# Patient Record
Sex: FEMALE
[De-identification: names, ages, dates, MRNs, and addresses within clinical notes are randomized per-mention and may not be internally consistent; named-entity substitution may affect disease eponyms.]

---

## 2024-07-31 ENCOUNTER — APPOINTMENT (OUTPATIENT)
Dept: GERIATRICS | Facility: CLINIC | Age: 79
End: 2024-07-31
Payer: SELF-PAY

## 2024-07-31 VITALS
OXYGEN SATURATION: 98 % | SYSTOLIC BLOOD PRESSURE: 124 MMHG | HEART RATE: 72 BPM | RESPIRATION RATE: 14 BRPM | DIASTOLIC BLOOD PRESSURE: 78 MMHG

## 2024-07-31 DIAGNOSIS — F32.A ANXIETY DISORDER, UNSPECIFIED: ICD-10-CM

## 2024-07-31 DIAGNOSIS — E10.9 TYPE 1 DIABETES MELLITUS W/OUT COMPLICATIONS: ICD-10-CM

## 2024-07-31 DIAGNOSIS — F41.9 ANXIETY DISORDER, UNSPECIFIED: ICD-10-CM

## 2024-07-31 DIAGNOSIS — K86.89 OTHER SPECIFIED DISEASES OF PANCREAS: ICD-10-CM

## 2024-07-31 DIAGNOSIS — Z71.89 OTHER SPECIFIED COUNSELING: ICD-10-CM

## 2024-07-31 DIAGNOSIS — I10 ESSENTIAL (PRIMARY) HYPERTENSION: ICD-10-CM

## 2024-07-31 DIAGNOSIS — K59.01 SLOW TRANSIT CONSTIPATION: ICD-10-CM

## 2024-07-31 DIAGNOSIS — S39.92XA UNSPECIFIED INJURY OF LOWER BACK, INITIAL ENCOUNTER: ICD-10-CM

## 2024-07-31 DIAGNOSIS — R26.9 UNSPECIFIED ABNORMALITIES OF GAIT AND MOBILITY: ICD-10-CM

## 2024-07-31 DIAGNOSIS — F03.90 UNSPECIFIED DEMENTIA W/OUT BEHAVIORAL DISTURBANCE: ICD-10-CM

## 2024-07-31 DIAGNOSIS — L89.92 PRESSURE ULCER OF UNSPECIFIED SITE, STAGE 2: ICD-10-CM

## 2024-07-31 DIAGNOSIS — R10.9 UNSPECIFIED ABDOMINAL PAIN: ICD-10-CM

## 2024-07-31 PROBLEM — Z00.00 ENCOUNTER FOR PREVENTIVE HEALTH EXAMINATION: Status: ACTIVE | Noted: 2024-07-31

## 2024-07-31 PROCEDURE — 99344 HOME/RES VST NEW MOD MDM 60: CPT

## 2024-07-31 RX ORDER — LOSARTAN POTASSIUM 25 MG/1
25 TABLET, FILM COATED ORAL
Qty: 90 | Refills: 2 | Status: ACTIVE | COMMUNITY
Start: 2024-07-31

## 2024-07-31 RX ORDER — DOCUSATE SODIUM 100 MG/1
100 CAPSULE, LIQUID FILLED ORAL 3 TIMES DAILY
Qty: 90 | Refills: 1 | Status: ACTIVE | COMMUNITY
Start: 2024-07-31 | End: 1900-01-01

## 2024-07-31 RX ORDER — INSULIN LISPRO-AABC 100 [IU]/ML
100 INJECTION, SOLUTION SUBCUTANEOUS
Qty: 1 | Refills: 3 | Status: ACTIVE | COMMUNITY
Start: 2024-07-31 | End: 1900-01-01

## 2024-07-31 RX ORDER — ESCITALOPRAM OXALATE 5 MG/1
5 TABLET ORAL DAILY
Qty: 90 | Refills: 0 | Status: ACTIVE | COMMUNITY
Start: 2024-07-31 | End: 1900-01-01

## 2024-07-31 RX ORDER — AMLODIPINE BESYLATE 2.5 MG/1
2.5 TABLET ORAL
Qty: 90 | Refills: 1 | Status: ACTIVE | COMMUNITY
Start: 2024-07-31 | End: 1900-01-01

## 2024-07-31 RX ORDER — RIVASTIGMINE 4.6 MG/24H
4.6 PATCH, EXTENDED RELEASE TRANSDERMAL DAILY
Qty: 90 | Refills: 0 | Status: ACTIVE | COMMUNITY
Start: 2024-07-31 | End: 1900-01-01

## 2024-08-01 ENCOUNTER — EMERGENCY (EMERGENCY)
Facility: HOSPITAL | Age: 79
LOS: 1 days | Discharge: HOME | End: 2024-08-01
Attending: EMERGENCY MEDICINE | Admitting: EMERGENCY MEDICINE
Payer: MEDICARE

## 2024-08-01 VITALS
OXYGEN SATURATION: 99 % | SYSTOLIC BLOOD PRESSURE: 135 MMHG | TEMPERATURE: 99 F | RESPIRATION RATE: 18 BRPM | DIASTOLIC BLOOD PRESSURE: 71 MMHG | HEART RATE: 70 BPM

## 2024-08-01 VITALS
TEMPERATURE: 98 F | WEIGHT: 115.96 LBS | HEART RATE: 72 BPM | HEIGHT: 63 IN | DIASTOLIC BLOOD PRESSURE: 73 MMHG | RESPIRATION RATE: 16 BRPM | OXYGEN SATURATION: 97 % | SYSTOLIC BLOOD PRESSURE: 148 MMHG

## 2024-08-01 DIAGNOSIS — I10 ESSENTIAL (PRIMARY) HYPERTENSION: ICD-10-CM
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
## 2024-08-01 DIAGNOSIS — S01.01XA LACERATION WITHOUT FOREIGN BODY OF SCALP, INITIAL ENCOUNTER: ICD-10-CM

## 2024-08-01 DIAGNOSIS — Z23 ENCOUNTER FOR IMMUNIZATION: ICD-10-CM

## 2024-08-01 DIAGNOSIS — S09.90XA UNSPECIFIED INJURY OF HEAD, INITIAL ENCOUNTER: ICD-10-CM
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
## 2024-08-01 DIAGNOSIS — E11.9 TYPE 2 DIABETES MELLITUS WITHOUT COMPLICATIONS: ICD-10-CM

## 2024-08-01 PROBLEM — E10.9 T1DM (TYPE 1 DIABETES MELLITUS): Status: ACTIVE | Noted: 2024-07-31

## 2024-08-01 PROBLEM — K86.89 PANCREATIC MASS: Status: ACTIVE | Noted: 2024-08-01

## 2024-08-01 PROBLEM — F41.9 ANXIETY AND DEPRESSION: Status: ACTIVE | Noted: 2024-07-31

## 2024-08-01 PROBLEM — S39.92XA BACK INJURY: Status: ACTIVE | Noted: 2024-08-01

## 2024-08-01 PROBLEM — K59.01 SLOW TRANSIT CONSTIPATION: Status: ACTIVE | Noted: 2024-07-31

## 2024-08-01 PROBLEM — R10.9 STOMACH CRAMPS: Status: ACTIVE | Noted: 2024-07-31

## 2024-08-01 PROBLEM — Z71.89 ACP (ADVANCE CARE PLANNING): Status: ACTIVE | Noted: 2024-08-01

## 2024-08-01 PROBLEM — L89.92 PRESSURE INJURY, STAGE 2: Status: ACTIVE | Noted: 2024-08-01

## 2024-08-01 PROBLEM — F03.90 DEMENTIA: Status: ACTIVE | Noted: 2024-07-31

## 2024-08-01 PROBLEM — R26.9 ABNORMAL GAIT: Status: ACTIVE | Noted: 2024-08-01

## 2024-08-01 PROCEDURE — 72125 CT NECK SPINE W/O DYE: CPT | Mod: MC

## 2024-08-01 PROCEDURE — 70450 CT HEAD/BRAIN W/O DYE: CPT | Mod: 26,MC

## 2024-08-01 PROCEDURE — 72125 CT NECK SPINE W/O DYE: CPT | Mod: 26,MC

## 2024-08-01 PROCEDURE — 12001 RPR S/N/AX/GEN/TRNK 2.5CM/<: CPT

## 2024-08-01 PROCEDURE — 72170 X-RAY EXAM OF PELVIS: CPT

## 2024-08-01 PROCEDURE — 90715 TDAP VACCINE 7 YRS/> IM: CPT

## 2024-08-01 PROCEDURE — 99284 EMERGENCY DEPT VISIT MOD MDM: CPT | Mod: 25

## 2024-08-01 PROCEDURE — 71045 X-RAY EXAM CHEST 1 VIEW: CPT | Mod: 26

## 2024-08-01 PROCEDURE — 90471 IMMUNIZATION ADMIN: CPT

## 2024-08-01 PROCEDURE — 71045 X-RAY EXAM CHEST 1 VIEW: CPT

## 2024-08-01 PROCEDURE — 72170 X-RAY EXAM OF PELVIS: CPT | Mod: 26

## 2024-08-01 PROCEDURE — 70450 CT HEAD/BRAIN W/O DYE: CPT | Mod: MC

## 2024-08-01 PROCEDURE — 99285 EMERGENCY DEPT VISIT HI MDM: CPT | Mod: 25

## 2024-08-02 NOTE — REVIEW OF SYSTEMS
[Confused] : confusion [As Noted in HPI] : as noted in HPI [Negative] : Heme/Lymph [Feeling Poorly] : not feeling poorly [Feeling Tired] : not feeling tired [Discharge From Eyes] : no purulent discharge from the eyes [Dry Eyes] : no dryness of the eyes [Nosebleeds] : no nosebleeds [Nasal Discharge] : no nasal discharge [Chest Pain] : no chest pain [Palpitations] : no palpitations [Shortness Of Breath] : no shortness of breath [Wheezing] : no wheezing [Cough] : no cough [SOB on Exertion] : no shortness of breath during exertion [Abdominal Pain] : no abdominal pain [Vomiting] : no vomiting [Constipation] : no constipation [Diarrhea] : no diarrhea [Dysuria] : no dysuria [Limb Pain] : no limb pain [Skin Wound] : no skin wound [Dizziness] : no dizziness [Anxiety] : no anxiety [Depression] : no depression

## 2024-08-02 NOTE — ADDENDUM
[FreeTextEntry1] : Attempted to speak with patient's HCP to get medical history. Only information that was obtained was that patient was living in [place] in an [facility] prior to visiting [place] for a weekend. On that visit, she fell, ended up at [facility] and was found to have an L1-L2 compression fracture. She then went to an [facility] and was discharged to [place].   Soon after, HCP, Minor, became incredibly agitated yelling about the various things she is upset with at [facility]. Despite my attempts to address her frustrations, she continued to yell, would not answer pertinent medical questions about the patient and stated several demeaning things about NPs.   This has been escalated to appropriate management.  Franny Preciado, MARIE-BC

## 2024-08-02 NOTE — HISTORY OF PRESENT ILLNESS
[With Patient/Caregiver] : , with patient/caregiver [Reviewed no changes] : Reviewed, no changes [Any fall with injury in past year] : Patient reported fall with injury in the past year [Full assistance needed] : Assistance needed managing medications [] : Assistance needed managing finances. [NO] : No [0] : 2) Feeling down, depressed, or hopeless: Not at all (0) [PHQ-2 Negative - No further assessment needed] : PHQ-2 Negative - No further assessment needed [FreeTextEntry1] : CHAGO ROSAS is a 80 yo woman with PMH of dementia, anxiety and depression, T1DM, HTN and ?pancreatic mass who presents for initial evaluation at [place] [address] [facility]. Patient is accompanied by [facility] staff for visit. History obtained largely from [address] and [facility] RNs.  Patient moved to [facility] on 7/31/24.   Patient is a poor historian. Called HCP (cousin, Minor) but unable to speak today. Plan to further discuss on Friday. Prior to coming to [facility], patient had a fall (unclear when). Injuries are unclear however states "I hurt my back" and she currently has a back brace on. States she feels well and that she does not have any acute medical concerns.   Specialists: Dr. Courtney Sewell -neuro   Dementia:  -unclear when this was diagnosed or if patient follows with neuro -not on meds for dementia -no documented behavioral disturbance  Anxiety/depression: -on lexapro 5mg qd  T1DM: -on Lyumjev insulin prior to meals (sliding scale) -on tresiba 18 units qhs - was receiving this in the morning at [facility]  -just got here today so no blood sugar log for review  HTN: -on amlodipine 2.5 mg and losartan 25mg qd  ?pancreatic mass: -this history is unclear- need collateral history from HCP  -had a Whiple procedure and   ACP: HCP: Minor Wayne ([phone])/ alternate HCP: James Wayne ([phone]) -no MOLST in place   Immunizations: need records COVID-19: Flu: Shingles: Pna: Tdap:   [FreeTextEntry7] : need records [Driving Concerns] : not driving or driving without noted concerns [de-identified] : 0 [de-identified] : rollator  [[id number]] : 0 [AdvancecareDate] : 07/24 [FreeTextEntry4] : HCP: Minor Wayne ([phone])/ alternate HCP: James Wayne ([phone]) no MOLST in place

## 2024-08-02 NOTE — PHYSICAL EXAM
[Alert] : alert [No Acute Distress] : in no acute distress [Sclera] : the sclera and conjunctiva were normal [EOMI] : extraocular movements were intact [Normal Oral Mucosa] : normal oral mucosa [Normal Outer Ear/Nose] : the ears and nose were normal in appearance [Normal Appearance] : the appearance of the neck was normal [Supple] : the neck was supple [No Respiratory Distress] : no respiratory distress [No Acc Muscle Use] : no accessory muscle use [Respiration, Rhythm And Depth] : normal respiratory rhythm and effort [Auscultation Breath Sounds / Voice Sounds] : lungs were clear to auscultation bilaterally [Normal S1, S2] : normal S1 and S2 [Heart Rate And Rhythm] : heart rate was normal and rhythm regular [Edema] : edema was not present [Bowel Sounds] : normal bowel sounds [Abdomen Tenderness] : non-tender [Abdomen Soft] : soft [Cervical Lymph Nodes Enlarged Posterior Bilaterally] : posterior cervical [Cervical Lymph Nodes Enlarged Anterior Bilaterally] : anterior cervical, supraclavicular [No Spinal Tenderness] : no spinal tenderness [No Focal Deficits] : no focal deficits [Normal Affect] : the affect was normal [Normal Mood] : the mood was normal [Oriented to Person] : oriented to person [Normal Gait] : abnormal gait [Normal Insight/Judgment] : insight and judgment were not intact [Oriented to Place] : disoriented to place [Oriented to Time] : disoriented to time [de-identified] : stage 2 sacral pressure injury

## 2024-08-02 NOTE — HISTORY OF PRESENT ILLNESS
[With Patient/Caregiver] : , with patient/caregiver [Reviewed no changes] : Reviewed, no changes [Any fall with injury in past year] : Patient reported fall with injury in the past year [Full assistance needed] : Assistance needed managing medications [] : Assistance needed managing finances. [NO] : No [0] : 2) Feeling down, depressed, or hopeless: Not at all (0) [PHQ-2 Negative - No further assessment needed] : PHQ-2 Negative - No further assessment needed [FreeTextEntry1] : CHAGO ROSAS is a 78 yo woman with PMH of dementia, anxiety and depression, T1DM, HTN and ?pancreatic mass who presents for initial evaluation at [place] [address] [facility]. Patient is accompanied by [facility] staff for visit. History obtained largely from [address] and [facility] RNs.  Patient moved to [facility] on 7/31/24.   Patient is a poor historian. Called HCP (cousin, Minor) but unable to speak today. Plan to further discuss on Friday. Prior to coming to [facility], patient had a fall (unclear when). Injuries are unclear however states "I hurt my back" and she currently has a back brace on. States she feels well and that she does not have any acute medical concerns.   Specialists: Dr. Courtney Sewell -neuro   Dementia:  -unclear when this was diagnosed or if patient follows with neuro -not on meds for dementia -no documented behavioral disturbance  Anxiety/depression: -on lexapro 5mg qd  T1DM: -on Lyumjev insulin prior to meals (sliding scale) -on tresiba 18 units qhs - was receiving this in the morning at [facility]  -just got here today so no blood sugar log for review  HTN: -on amlodipine 2.5 mg and losartan 25mg qd  ?pancreatic mass: -this history is unclear- need collateral history from HCP  -had a Whiple procedure and   ACP: HCP: Minor Wayne ([phone])/ alternate HCP: James Wayne ([phone]) -no MOLST in place   Immunizations: need records COVID-19: Flu: Shingles: Pna: Tdap:   [FreeTextEntry7] : need records [Driving Concerns] : not driving or driving without noted concerns [de-identified] : 0 [de-identified] : rollator  [[id number]] : 0 [AdvancecareDate] : 07/24 [FreeTextEntry4] : HCP: Minor Wayne ([phone])/ alternate HCP: James Wayne ([phone]) no MOLST in place

## 2024-08-02 NOTE — PHYSICAL EXAM
[Alert] : alert [No Acute Distress] : in no acute distress [Sclera] : the sclera and conjunctiva were normal [EOMI] : extraocular movements were intact [Normal Oral Mucosa] : normal oral mucosa [Normal Outer Ear/Nose] : the ears and nose were normal in appearance [Normal Appearance] : the appearance of the neck was normal [Supple] : the neck was supple [No Respiratory Distress] : no respiratory distress [No Acc Muscle Use] : no accessory muscle use [Respiration, Rhythm And Depth] : normal respiratory rhythm and effort [Auscultation Breath Sounds / Voice Sounds] : lungs were clear to auscultation bilaterally [Normal S1, S2] : normal S1 and S2 [Heart Rate And Rhythm] : heart rate was normal and rhythm regular [Edema] : edema was not present [Bowel Sounds] : normal bowel sounds [Abdomen Tenderness] : non-tender [Abdomen Soft] : soft [Cervical Lymph Nodes Enlarged Posterior Bilaterally] : posterior cervical [Cervical Lymph Nodes Enlarged Anterior Bilaterally] : anterior cervical, supraclavicular [No Spinal Tenderness] : no spinal tenderness [No Focal Deficits] : no focal deficits [Normal Affect] : the affect was normal [Normal Mood] : the mood was normal [Oriented to Person] : oriented to person [Normal Gait] : abnormal gait [Normal Insight/Judgment] : insight and judgment were not intact [Oriented to Place] : disoriented to place [Oriented to Time] : disoriented to time [de-identified] : stage 2 sacral pressure injury

## 2024-08-02 NOTE — ADDENDUM
[FreeTextEntry1] : Attempted to speak with patient's HCP to get medical history. Only information that was obtained was that [place] was living in [place] in an [facility] prior to visiting [place] for a weekend. On that visit, she fell, ended up at [facility] and was found to have an L1-L2 compression fracture. She then went to an [facility] and was discharged to [place].   Soon after, HCP, Minor, became incredibly agitated yelling about the various things she is upset with at [facility]. Despite my attempts to address her frustrations, she continued to yell, would not answer pertinent medical questions about the patient and stated several demeaning things about NPs.   This has been escalated to appropriate management.  Franny Preciado, MARIE-BC

## 2024-08-02 NOTE — PHYSICAL EXAM
[Alert] : alert [No Acute Distress] : in no acute distress [Sclera] : the sclera and conjunctiva were normal [EOMI] : extraocular movements were intact [Normal Oral Mucosa] : normal oral mucosa [Normal Outer Ear/Nose] : the ears and nose were normal in appearance [Normal Appearance] : the appearance of the neck was normal [Supple] : the neck was supple [No Respiratory Distress] : no respiratory distress [No Acc Muscle Use] : no accessory muscle use [Respiration, Rhythm And Depth] : normal respiratory rhythm and effort [Auscultation Breath Sounds / Voice Sounds] : lungs were clear to auscultation bilaterally [Normal S1, S2] : normal S1 and S2 [Heart Rate And Rhythm] : heart rate was normal and rhythm regular [Edema] : edema was not present [Bowel Sounds] : normal bowel sounds [Abdomen Tenderness] : non-tender [Abdomen Soft] : soft [Cervical Lymph Nodes Enlarged Posterior Bilaterally] : posterior cervical [Cervical Lymph Nodes Enlarged Anterior Bilaterally] : anterior cervical, supraclavicular [No Spinal Tenderness] : no spinal tenderness [No Focal Deficits] : no focal deficits [Normal Affect] : the affect was normal [Normal Mood] : the mood was normal [Oriented to Person] : oriented to person [Normal Gait] : abnormal gait [Normal Insight/Judgment] : insight and judgment were not intact [Oriented to Place] : disoriented to place [Oriented to Time] : disoriented to time [de-identified] : stage 2 sacral pressure injury  PCP

## 2024-08-02 NOTE — ASSESSMENT
[FreeTextEntry1] : Plan to speak with HCP on Friday to obtain more detailed history  Follow up in 2 months; earlier sunitha Preciado, LEANDRAP-BC

## 2024-08-02 NOTE — ADDENDUM
[FreeTextEntry1] : Attempted to speak with patient's HCP to get medical history. Only information that was obtained was that [place] was living [place] in an [facility] prior to visiting [place] for a weekend. On that visit, she fell, ended up at [facility] and was found to have an L1-L2 compression fracture. She then went to an [facility] and was discharged to [place].   Soon after, HCP, Minor, became incredibly agitated yelling about the various things she is upset with at [facility]. Despite my attempts to address her frustrations, she continued to yell, would not answer pertinent medical questions about the patient and stated several demeaning things about NPs.   This has been escalated to appropriate management.  Franny Preciado, MARIE-BC

## 2024-08-02 NOTE — HISTORY OF PRESENT ILLNESS
[With Patient/Caregiver] : , with patient/caregiver [Reviewed no changes] : Reviewed, no changes [Any fall with injury in past year] : Patient reported fall with injury in the past year [Full assistance needed] : Assistance needed managing medications [] : Assistance needed managing finances. [NO] : No [0] : 2) Feeling down, depressed, or hopeless: Not at all (0) [PHQ-2 Negative - No further assessment needed] : PHQ-2 Negative - No further assessment needed [FreeTextEntry1] : CHAGO ROSAS is a 78 yo woman with PMH of dementia, anxiety and depression, T1DM, HTN and ?pancreatic mass who presents for initial evaluation at [place] [address] [facility]. Patient is accompanied by [facility] staff for visit. History obtained largely from [facility] and [facility] RNs.  Patient moved to [facility] on 7/31/24.   Patient is a poor historian. Called HCP (cousin, Minor) but unable to speak today. Plan to further discuss on Friday. Prior to coming to [facility], patient had a fall (unclear when). Injuries are unclear however states "I hurt my back" and she currently has a back brace on. States she feels well and that she does not have any acute medical concerns.   Specialists: Dr. Courtney Sewell -neuro   Dementia:  -unclear when this was diagnosed or if patient follows with neuro -not on meds for dementia -no documented behavioral disturbance  Anxiety/depression: -on lexapro 5mg qd  T1DM: -on Lyumjev insulin prior to meals (sliding scale) -on tresiba 18 units qhs - was receiving this in the morning at [facility]  -just got here today so no blood sugar log for review  HTN: -on amlodipine 2.5 mg and losartan 25mg qd  ?pancreatic mass: -this history is unclear- need collateral history from HCP  -had a Whiple procedure and   ACP: HCP: Minor Wayne ([phone])/ alternate HCP: James Wayne ([phone]) -no MOLST in place   Immunizations: need records COVID-19: Flu: Shingles: Pna: Tdap:   [FreeTextEntry7] : need records [Driving Concerns] : not driving or driving without noted concerns [de-identified] : 0 [de-identified] : rollator  [[id number]] : 0 [AdvancecareDate] : 07/24 [FreeTextEntry4] : HCP: Minor Wayne ([phone])/ alternate HCP: James Wayne ([phone]) no MOLST in place

## 2024-08-05 RX ORDER — INSULIN DEGLUDEC INJECTION 100 U/ML
100 INJECTION, SOLUTION SUBCUTANEOUS
Qty: 1 | Refills: 3 | Status: ACTIVE | COMMUNITY
Start: 2024-07-31

## 2024-08-07 ENCOUNTER — LABORATORY RESULT (OUTPATIENT)
Age: 79
End: 2024-08-07

## 2024-08-08 ENCOUNTER — LABORATORY RESULT (OUTPATIENT)
Age: 79
End: 2024-08-08

## 2024-08-09 RX ORDER — DICYCLOMINE HYDROCHLORIDE 20 MG/1
20 TABLET ORAL
Qty: 90 | Refills: 0 | Status: ACTIVE | COMMUNITY
Start: 2024-07-31 | End: 1900-01-01

## 2024-08-14 ENCOUNTER — LABORATORY RESULT (OUTPATIENT)
Age: 79
End: 2024-08-14

## 2024-08-14 ENCOUNTER — NON-APPOINTMENT (OUTPATIENT)
Age: 79
End: 2024-08-14

## 2024-08-14 ENCOUNTER — APPOINTMENT (OUTPATIENT)
Dept: GERIATRICS | Facility: ASSISTED LIVING FACILITY | Age: 79
End: 2024-08-14

## 2024-08-14 VITALS
OXYGEN SATURATION: 97 % | DIASTOLIC BLOOD PRESSURE: 70 MMHG | HEART RATE: 81 BPM | SYSTOLIC BLOOD PRESSURE: 130 MMHG | RESPIRATION RATE: 14 BRPM

## 2024-08-14 DIAGNOSIS — F32.A ANXIETY DISORDER, UNSPECIFIED: ICD-10-CM

## 2024-08-14 DIAGNOSIS — I10 ESSENTIAL (PRIMARY) HYPERTENSION: ICD-10-CM

## 2024-08-14 DIAGNOSIS — F41.9 ANXIETY DISORDER, UNSPECIFIED: ICD-10-CM

## 2024-08-14 DIAGNOSIS — K59.01 SLOW TRANSIT CONSTIPATION: ICD-10-CM

## 2024-08-14 DIAGNOSIS — E55.9 VITAMIN D DEFICIENCY, UNSPECIFIED: ICD-10-CM

## 2024-08-14 DIAGNOSIS — L89.92 PRESSURE ULCER OF UNSPECIFIED SITE, STAGE 2: ICD-10-CM

## 2024-08-14 DIAGNOSIS — F03.90 UNSPECIFIED DEMENTIA W/OUT BEHAVIORAL DISTURBANCE: ICD-10-CM

## 2024-08-14 DIAGNOSIS — K86.89 OTHER SPECIFIED DISEASES OF PANCREAS: ICD-10-CM

## 2024-08-14 DIAGNOSIS — S01.01XS LACERATION W/OUT FOREIGN BODY OF SCALP, SEQUELA: ICD-10-CM

## 2024-08-14 DIAGNOSIS — D64.9 ANEMIA, UNSPECIFIED: ICD-10-CM

## 2024-08-14 PROCEDURE — 99349 HOME/RES VST EST MOD MDM 40: CPT

## 2024-08-14 PROCEDURE — 15854 REMOVAL SUTR&STAPL XREQ ANES: CPT

## 2024-08-14 PROCEDURE — G2212 PROLONG OUTPT/OFFICE VIS: CPT

## 2024-08-14 PROCEDURE — G2211 COMPLEX E/M VISIT ADD ON: CPT | Mod: NC

## 2024-08-14 RX ORDER — MULTIVIT-MIN/IRON/FOLIC ACID/K 18-600-40
50 MCG CAPSULE ORAL
Qty: 90 | Refills: 3 | Status: ACTIVE | COMMUNITY
Start: 2024-08-14 | End: 1900-01-01

## 2024-08-15 ENCOUNTER — LABORATORY RESULT (OUTPATIENT)
Age: 79
End: 2024-08-15

## 2024-08-17 PROBLEM — S01.01XS SCALP LACERATION, SEQUELA: Status: ACTIVE | Noted: 2024-08-17

## 2024-08-17 PROBLEM — E55.9 VITAMIN D DEFICIENCY: Status: ACTIVE | Noted: 2024-08-14

## 2024-08-17 NOTE — ASSESSMENT
[FreeTextEntry1] : - Healed scalp laceration on right posterior scalp - 4 staples removed. - FSG review shows better am FSG since adjutment of tresiba to 12u QHS 2 days ago. She remains w/ sporadic FSG throughout the day that range from 100s-300s w/ good response to ISS that she remains on. She has endo apt on 8/19. She also eats at different times of the day and different food both at [facility] and outside [facility] when attending outings.  - Endo recs greatly appreciated for DM1 mgmt - consider CGM if deemed appropriate by endo.  - Endo recs appreciated for c/w prolia for OP.  - While plan was discussed w/ pt's HCP/cousin (Dr. Minor Wayne) the collateral history provided was very limited and will require further d/w HCP.  - Pt is doing wound care/PT w/ [facility]. - RTC 2-4 weeks or prn.  I spent 100 min coordinating this patient's care.

## 2024-08-17 NOTE — HISTORY OF PRESENT ILLNESS
[FreeTextEntry1] : CHAGO ROSAS is a 80 yo woman with PMH of dementia, OP (on prolia), anxiety and depression, T1DM, HTN and ?pancreatic mass who presents for f/u visit at [place] [address] [facility]. Patient is accompanied by ANNE-MARIE RN.   She had a fall on the first night she arrived to [facility] on 7/31/24 and was sent to ED [facility] where she received 4 staples on scalp for laceration.   Her tresiba was recently adjusted to 12u QHS 2/2 hypoglycemia in the am - since this adjustment her am FSG have improved to 80's. She remains w/ sporadic FSG throughout the day that range from 100s-300s w/ good response to ISS that she remains on. She has endo apt on 8/19. She also eats at different times of the day and different food both at [facility] and outside [facility] when attending outings.   Today pt is otherwise w/o complaints today.   ========================================== History obtained largely from [facility] and [facility] RNs.  Patient moved to [facility] on 7/31/24.   Patient is a poor historian. Called HCP (cousin, Minor) but unable to speak today. Plan to further discuss on Friday. Prior to coming to [facility], patient had a fall (unclear when). Injuries are unclear however states "I hurt my back" and she currently has a back brace on. States she feels well and that she does not have any acute medical concerns.   Specialists: Dr. Courtney Sewell -neuro   Dementia:  -unclear when this was diagnosed or if patient follows with neuro -not on meds for dementia -no documented behavioral disturbance  Anxiety/depression: -on lexapro 5mg qd  T1DM: -on Lyumjev insulin prior to meals (sliding scale) -on tresiba 18 units qhs - was receiving this in the morning at [facility]  -just got here today so no blood sugar log for review  HTN: -on amlodipine 2.5 mg and losartan 25mg qd  ?pancreatic mass: -this history is unclear- need collateral history from HCP  -had a Whiple procedure and   ACP: HCP: Minor Wayne ([phone])/ alternate HCP: James Wayne ([phone]) -no MOLST in place   Immunizations: need records COVID-19: Flu: Shingles: Pna: Tdap:   [FreeTextEntry7] : need records [Any fall with injury in past year] : Patient reported fall with injury in the past year [Full assistance needed] : Assistance needed managing medications [] : Assistance needed managing finances. [Driving Concerns] : not driving or driving without noted concerns [de-identified] : 0 [de-identified] : rollator  [NO] : No [0] : 2) Feeling down, depressed, or hopeless: Not at all (0) [PHQ-2 Negative - No further assessment needed] : PHQ-2 Negative - No further assessment needed [QGC1Tnnth] : 0 [With Patient/Caregiver] : , with patient/caregiver [Reviewed no changes] : Reviewed, no changes [AdvancecareDate] : 07/24 [FreeTextEntry4] : HCP: Minor Wayne ([phone])/ alternate HCP: James Wayne ([phone]) no MOLST in place

## 2024-08-17 NOTE — PHYSICAL EXAM
[Alert] : alert [Sclera] : the sclera and conjunctiva were normal [EOMI] : extraocular movements were intact [Normal Oral Mucosa] : normal oral mucosa [Normal Outer Ear/Nose] : the ears and nose were normal in appearance [Normal Appearance] : the appearance of the neck was normal [Supple] : the neck was supple [No Respiratory Distress] : no respiratory distress [No Acc Muscle Use] : no accessory muscle use [Respiration, Rhythm And Depth] : normal respiratory rhythm and effort [Normal S1, S2] : normal S1 and S2 [Auscultation Breath Sounds / Voice Sounds] : lungs were clear to auscultation bilaterally [Heart Rate And Rhythm] : heart rate was normal and rhythm regular [Edema] : edema was not present [Bowel Sounds] : normal bowel sounds [Abdomen Tenderness] : non-tender [Abdomen Soft] : soft [No Spinal Tenderness] : no spinal tenderness [Normal Gait] : abnormal gait [No Focal Deficits] : no focal deficits [Normal Affect] : the affect was normal [Normal Insight/Judgment] : insight and judgment were not intact [Normal Mood] : the mood was normal [Oriented to Person] : oriented to person [Oriented to Place] : disoriented to place [Oriented to Time] : disoriented to time [de-identified] : Healed scalp laceration on right posterior scalp - 4 staples removed.

## 2024-08-17 NOTE — REVIEW OF SYSTEMS
[Feeling Poorly] : not feeling poorly [Feeling Tired] : not feeling tired [Eye Pain] : no eye pain [Discharge From Eyes] : no purulent discharge from the eyes [Dry Eyes] : no dryness of the eyes [Nosebleeds] : no nosebleeds [Nasal Discharge] : no nasal discharge [Heart Rate Is Fast] : the heart rate was not fast [Chest Pain] : no chest pain [Palpitations] : no palpitations [Shortness Of Breath] : no shortness of breath [Wheezing] : no wheezing [Cough] : no cough [SOB on Exertion] : no shortness of breath during exertion [Abdominal Pain] : no abdominal pain [Vomiting] : no vomiting [Constipation] : no constipation [Diarrhea] : no diarrhea [Dysuria] : no dysuria [Limb Pain] : no limb pain [Skin Wound] : skin wound [Dizziness] : no dizziness [Fainting] : no fainting [Anxiety] : no anxiety [Depression] : no depression [As Noted in HPI] : as noted in HPI [Negative] : Heme/Lymph

## 2024-08-19 ENCOUNTER — APPOINTMENT (OUTPATIENT)
Dept: ENDOCRINOLOGY | Facility: CLINIC | Age: 79
End: 2024-08-19
Payer: COMMERCIAL

## 2024-08-19 VITALS — DIASTOLIC BLOOD PRESSURE: 65 MMHG | HEART RATE: 88 BPM | WEIGHT: 102 LBS | SYSTOLIC BLOOD PRESSURE: 122 MMHG

## 2024-08-19 DIAGNOSIS — M81.0 AGE-RELATED OSTEOPOROSIS W/OUT CURRENT PATHOLOGICAL FRACTURE: ICD-10-CM

## 2024-08-19 DIAGNOSIS — E10.9 TYPE 1 DIABETES MELLITUS W/OUT COMPLICATIONS: ICD-10-CM

## 2024-08-19 LAB
GLUCOSE BLDC GLUCOMTR-MCNC: 195
HBA1C MFR BLD HPLC: 6.6

## 2024-08-19 PROCEDURE — 83036 HEMOGLOBIN GLYCOSYLATED A1C: CPT | Mod: QW

## 2024-08-19 PROCEDURE — 99204 OFFICE O/P NEW MOD 45 MIN: CPT

## 2024-08-19 PROCEDURE — G2211 COMPLEX E/M VISIT ADD ON: CPT | Mod: NC

## 2024-08-19 NOTE — HISTORY OF PRESENT ILLNESS
[FreeTextEntry1] : Ms. Adam Dobson is a 79-year-old woman with a history of type 1 diabetes mellitus, hypertension, osteoporosis (currently on Prolia, with the last dose administered in June 2024), pancreatic cancer (status post-Whipple procedure in 2019), anxiety/depression, and early dementia who presents to the clinic to establish care.  She previously sustained a compression fracture at L1-L2 in June 2024, which required rehabilitation, and she currently resides in a senior facility here in [place]. She used to live in [place] until earlier this year when she moved to [place]. A physician at the facility has been following her. She was referred to me for the management of her diabetes.  Diabetes History: - Age at Diagnosis: 37 years old. - Symptoms at Time of Diagnosis: Weight loss and abdominal pain. She believes she was diagnosed with type 1 diabetes because the initial oral medications were ineffective, leading to a transition to insulin. She doesn't know if antibodies were checked at the time of diagnosis, but her daughter confirms that she was following with an endocrinologist in [place] who at some point checked her insulin levels, which were undetectable. - Current Therapy: Tresiba 12 units daily, and Liumjev sliding scale (no insulin if blood glucose is below 200 mg/dL, unclear dose of insulin if above 200 mg/dL; her daughter doesn't have the scale with her but knows the facility is following this protocol). - History of Other Regimens: She does not recall the name of the pills she was initially prescribed, which she took for less than a year before switching to insulin. She has used various types of insulin over the years but does not remember the specifics. She was taking Tresiba 12 units daily and a standing dose of Liumjev with a sliding scale until earlier this year when she had the lumbar fracture. She doesn't remember the standing dose of Liumjev she was using prior to getting admitted to the facility but reports that this regimen had been working well for a long time. - History of Hypoglycemia: She has experienced multiple episodes of hypoglycemia over the past three weeks since getting admitted to the senior facility. Her hypoglycemic episodes occurred mostly early in the morning. She also reports waking up in the middle of the night because she feels as if her blood glucose is low and has to eat something. - History of DKA/HHS: She reports never having an episode of diabetic ketoacidosis in the past. - Complications: She was last seen by an ophthalmologist less than a year ago, without any signs of retinopathy. She has mild neuropathy symptoms (e.g., numbness/tingling); however, her monofilament test was within normal limits today. - Home FSG: Her glucose levels have been checked 3-4 times daily at the [facility]. Sometimes the bedtime fingerstick is missed, making it harder to interpret. Additionally, her glucose levels fluctuate during the day with occasional hyperglycemia, likely due to the use of a sliding scale before meals (if her glucose is below 200 mg/dL she doesn't get any coverage for meals). Her glucose levels tend to drop overnight (e.g., decreased from 244 mg/dL to 45 mg/dL on August 16-17). Her overnight snacking may also be masking episodes of hypoglycemia that are not apparent in her glucose log (see scanned media). - Her A1C was 6.6% today, which is artifactually given frequent hypoglycemic episodes.  - Diet:   - Breakfast: Scrambled eggs, one piece of toast, fruit (e.g., cantaloupe or blueberries), and coffee with milk.   - Lunch: Roast beef, a slice of rye bread, salad with mushrooms, and fruit (strawberries and blueberries).   - Dinner: Chicken with vegetables and either barley or a baked potato.   - Snacks: Occasionally has a rice cracker. She often wakes up between 11 PM and 3 AM to eat due to low glucose levels, typically consuming rice cakes and juice.  Pancreatic Cancer: She was diagnosed with pancreatic cancer five years ago and underwent a Whipple procedure (partial pancreatectomy). Her last pancreatic imaging was in July 2023, and she was due for a follow-up in July 2024. However, she was unable to schedule it due to being in [place] with medical issues. Her oncologist is based in [place], [place].  Osteoporosis: - Diagnosis: Many years ago. She has been on Prolia for approximately two years. She says that she may have been on pills before but doesn't remember the specifics; she thinks she was switched to Prolia as the pills didn't work. - Bone Density Scan: Her last scan was less than two years ago. - Fractures: Sustained an L1-L2 compression fracture in June 2024 after falling from a standing position. She has also fractured her clavicle, pelvis, and ribs in the past two years. Her most recent fall was on July 30, 2024. - Current Smoking: Denies. - Glucocorticoid Use: Denies. - Rheumatoid Arthritis: Denies. - Alcohol Consumption (3 or more units/day): Denies. - Parental History of Hip Fracture: Unknown.

## 2024-08-19 NOTE — REVIEW OF SYSTEMS
[Fatigue] : no fatigue [Fever] : no fever [Chills] : no chills [Chest Pain] : no chest pain [Shortness Of Breath] : no shortness of breath [Nausea] : no nausea [Vomiting] : no vomiting

## 2024-08-19 NOTE — ASSESSMENT
[FreeTextEntry1] : # Type 1 Diabetes Mellitus - Last hemoglobin A1C was 6.6% (artifactually low due to frequent hypoglycemia). - Fingerstick glucose: Frequent overnight hypoglycemia. She is being tested four times daily at the facility and has brought her glucose log (see scanned media). She is not interested in a continuous glucose monitor (CGM) at this time. - DECREASE Tresiba to 8 units daily due to frequent overnight hypoglycemia. - Continue with the Liumjev sliding scale for now, as the priority is to eliminate hypoglycemia. Once hypoglycemic episodes are resolved, we will introduce a small dose of Liumjev before meals. I explained to her that her A1C will likely increase as our goal is to decrease the frequency of hypoglycemic events. - Microvascular complications:   - Neuropathy: Monofilament test within normal limits.   - Nephropathy: Check albumin/creatinine ratio.   - Retinopathy: Up to date with eye exam. No retinopathy.  # Osteoporosis - Currently on Prolia, with the last dose administered in June 2024. The next dose is due in December 2024. - I advised her to bring records from her previous endocrinologist for her next visit so that I can review which therapies she has tried in the past. - Continue with Prolia every 6 months. She is reportedly due for a repeat DXA later this year; I will wait for her records before ordering a new one. - Fall precautions.  RTC in 2-3 weeks.

## 2024-08-19 NOTE — PHYSICAL EXAM
[Alert] : alert [Well Nourished] : well nourished [No Acute Distress] : no acute distress [Well Developed] : well developed [Normal Sclera/Conjunctiva] : normal sclera/conjunctiva [No Proptosis] : no proptosis [Thyroid Not Enlarged] : the thyroid was not enlarged [No Thyroid Nodules] : no palpable thyroid nodules [No Respiratory Distress] : no respiratory distress [No Accessory Muscle Use] : no accessory muscle use [Clear to Auscultation] : lungs were clear to auscultation bilaterally [Normal S1, S2] : normal S1 and S2 [Normal Rate] : heart rate was normal [Regular Rhythm] : with a regular rhythm [No Edema] : no peripheral edema [Pedal Pulses Normal] : the pedal pulses are present [No Stigmata of Cushings Syndrome] : no stigmata of Cushings Syndrome [No Rash] : no rash [Normal] : normal [Full ROM] : with full range of motion [Oriented x3] : oriented to person, place, and time [Acanthosis Nigricans] : no acanthosis nigricans [Diminished Throughout Both Feet] : normal tactile sensation with monofilament testing throughout both feet [de-identified] : Elderly woman, ambulating with a walker [de-identified] : (+) Wearing a brace for her lumbar fracture.

## 2024-08-21 LAB
CREAT SPEC-SCNC: 74 MG/DL
MICROALBUMIN 24H UR DL<=1MG/L-MCNC: 4.5 MG/DL
MICROALBUMIN/CREAT 24H UR-RTO: 61 MG/G

## 2024-08-29 ENCOUNTER — APPOINTMENT (OUTPATIENT)
Dept: AFTER HOURS CARE | Facility: EMERGENCY ROOM | Age: 79
End: 2024-08-29
Payer: COMMERCIAL

## 2024-08-29 PROCEDURE — 99203 OFFICE O/P NEW LOW 30 MIN: CPT

## 2024-08-30 ENCOUNTER — APPOINTMENT (OUTPATIENT)
Dept: GERIATRICS | Facility: ASSISTED LIVING FACILITY | Age: 79
End: 2024-08-30

## 2024-08-30 VITALS
SYSTOLIC BLOOD PRESSURE: 128 MMHG | HEART RATE: 82 BPM | RESPIRATION RATE: 14 BRPM | OXYGEN SATURATION: 97 % | DIASTOLIC BLOOD PRESSURE: 72 MMHG

## 2024-08-30 DIAGNOSIS — F32.A ANXIETY DISORDER, UNSPECIFIED: ICD-10-CM

## 2024-08-30 DIAGNOSIS — K59.01 SLOW TRANSIT CONSTIPATION: ICD-10-CM

## 2024-08-30 DIAGNOSIS — R26.9 UNSPECIFIED ABNORMALITIES OF GAIT AND MOBILITY: ICD-10-CM

## 2024-08-30 DIAGNOSIS — M81.0 AGE-RELATED OSTEOPOROSIS W/OUT CURRENT PATHOLOGICAL FRACTURE: ICD-10-CM

## 2024-08-30 DIAGNOSIS — H11.30 CONJUNCTIVAL HEMORRHAGE, UNSPECIFIED EYE: ICD-10-CM

## 2024-08-30 DIAGNOSIS — F03.90 UNSPECIFIED DEMENTIA W/OUT BEHAVIORAL DISTURBANCE: ICD-10-CM

## 2024-08-30 DIAGNOSIS — F41.9 ANXIETY DISORDER, UNSPECIFIED: ICD-10-CM

## 2024-08-30 DIAGNOSIS — E10.9 TYPE 1 DIABETES MELLITUS W/OUT COMPLICATIONS: ICD-10-CM

## 2024-08-30 DIAGNOSIS — I10 ESSENTIAL (PRIMARY) HYPERTENSION: ICD-10-CM

## 2024-08-30 PROCEDURE — 99349 HOME/RES VST EST MOD MDM 40: CPT

## 2024-08-30 PROCEDURE — G2211 COMPLEX E/M VISIT ADD ON: CPT | Mod: NC

## 2024-08-30 NOTE — HISTORY OF PRESENT ILLNESS
[Home] : at home, [unfilled] , at the time of the visit. [Other Location: e.g. Home (Enter Location, City,State)___] : at [unfilled] [Verbal consent obtained from patient] : the patient, [unfilled] [FreeTextEntry8] : Elderly f with right eye subconjunctival hemorrhage since this morning. Patient denies headache, vision changes or trauma.

## 2024-08-30 NOTE — PLAN
[FreeTextEntry1] : Elderly f with subconjunctival hemorrhage. I reccomnend supportive care, and f/u with opthomology. Patient does not have any blood thinners, nor does she wear contacts.

## 2024-09-03 NOTE — PHYSICAL EXAM
[Alert] : alert [No Acute Distress] : in no acute distress [Sclera] : the sclera and conjunctiva were normal [EOMI] : extraocular movements were intact [Normal Oral Mucosa] : normal oral mucosa [Normal Outer Ear/Nose] : the ears and nose were normal in appearance [Normal Appearance] : the appearance of the neck was normal [Supple] : the neck was supple [No Respiratory Distress] : no respiratory distress [No Acc Muscle Use] : no accessory muscle use [Respiration, Rhythm And Depth] : normal respiratory rhythm and effort [Auscultation Breath Sounds / Voice Sounds] : lungs were clear to auscultation bilaterally [Normal S1, S2] : normal S1 and S2 [Heart Rate And Rhythm] : heart rate was normal and rhythm regular [Edema] : edema was not present [Bowel Sounds] : normal bowel sounds [Abdomen Tenderness] : non-tender [Abdomen Soft] : soft [No Spinal Tenderness] : no spinal tenderness [Normal Gait] : abnormal gait [Involuntary Movements] : no involuntary movements were seen [Normal Color / Pigmentation] : normal skin color and pigmentation [No Focal Deficits] : no focal deficits [Normal Affect] : the affect was normal [Normal Insight/Judgment] : insight and judgment were not intact [Normal Mood] : the mood was normal [de-identified] : R eye stye present, no sign of cellulitis.

## 2024-09-03 NOTE — HISTORY OF PRESENT ILLNESS
[FreeTextEntry1] : CHAGO ROSSA is a 80 yo woman with PMH of dementia, OP (on prolia), anxiety and depression, T1DM, HTN and ?pancreatic mass who presents for f/u visit at [facility]. Patient is accompanied by ANNE-MARIE RN.   #DM  - She saw endo recently who adjusted Tresiba to 8u QHS 2/2 c/f hypoglycemia. Since then, her FSG have been sporadic but more recently predominantly in the high range 300-400's and no hypoglycemia in the am.  - Pt continues to eat sporadically throughout the day.  - Per endo visit summary from 9/2023 that pt's cousin sent, her prior DM regimen was Tresiba 10u QAM, lispro (lyumjev) 5u TIDAC and ISS starting at FSG>150.   #R eye redness - S/p telehealth yesterday, dx w/ subconjunctival hemorrhage, advised on warm compresses. Today per ANNE-MARIE staff eye redness is better, no pain per pt. ==================================================  She had a fall on the first night she arrived to [facility] on 7/31/24 and was sent to ED [facility] where she received 4 staples on scalp for laceration. Staples were removed on 8/14/24 w/o complications.  Her tresiba was recently adjusted to 12u QHS 2/2 hypoglycemia in the am - since this adjustment her am FSG have improved to 80's. She remains w/ sporadic FSG throughout the day that range from 100s-300s w/ good response to ISS that she remains on. She has endo apt on 8/19. She also eats at different times of the day and different food both at [facility] and outside [facility] when attending outings.   ========================================== History obtained largely from [facility] and ANNE-MARIE RNs.  Patient moved to [facility] on 7/31/24.   Patient is a poor historian. Called HCP (cousin, Minor) but unable to speak today. Plan to further discuss on Friday. Prior to coming to [facility], patient had a fall (unclear when). Injuries are unclear however states "I hurt my back" and she currently has a back brace on. States she feels well and that she does not have any acute medical concerns.   Specialists: Dr. Courtney Sewell -neuro   Dementia:  -unclear when this was diagnosed or if patient follows with neuro -not on meds for dementia -no documented behavioral disturbance  Anxiety/depression: -on lexapro 5mg qd  T1DM: -on Lyumjev insulin prior to meals (sliding scale) -on tresiba 18 units qhs - was receiving this in the morning at [facility]  -just got here today so no blood sugar log for review  HTN: -on amlodipine 2.5 mg and losartan 25mg qd  ?pancreatic mass: -this history is unclear- need collateral history from HCP  -had a Whiple procedure and   ACP: HCP: Minor Wayne ([phone])/ alternate HCP: James Wayne ([phone]) -no MOLST in place   Immunizations: need records COVID-19: Flu: Shingles: Pna: Tdap:   [FreeTextEntry7] : need records [Any fall with injury in past year] : Patient reported fall with injury in the past year [Full assistance needed] : Assistance needed managing medications [] : Assistance needed managing finances. [Driving Concerns] : not driving or driving without noted concerns [de-identified] : 0 [de-identified] : rollator  [NO] : No [0] : 2) Feeling down, depressed, or hopeless: Not at all (0) [PHQ-2 Negative - No further assessment needed] : PHQ-2 Negative - No further assessment needed [[id number]] : 0 [With Patient/Caregiver] : , with patient/caregiver [Reviewed no changes] : Reviewed, no changes [AdvancecareDate] : 07/24 [FreeTextEntry4] : HCP: Minor Wayne ([phone])/ alternate HCP: James Wayen ([phone]) no MOLST in place

## 2024-09-03 NOTE — ASSESSMENT
[FreeTextEntry1] : - Given now very high FSG throughout the day, increase Tresiba to 10u QHS. Start lispro 2u TIDAC, c/w ISS.  - Endo updated about FSG and current regimen.  - Agree w/ yesterday's telehealth evaluation for subconjunctival hemorrhage - c/w warm compresses. Continue to monitor.  - Pt's cousin updated over phone.  - C/w other meds. - RTC 4-8 weeks or prn.

## 2024-09-03 NOTE — REVIEW OF SYSTEMS
[Fever] : no fever [Chills] : no chills [Eye Pain] : no eye pain [Red Eyes] : eyes not red [Discharge From Eyes] : no purulent discharge from the eyes [Nosebleeds] : no nosebleeds [Nasal Discharge] : no nasal discharge [Sore Throat] : no sore throat [Heart Rate Is Fast] : the heart rate was not fast [Chest Pain] : no chest pain [Palpitations] : no palpitations [Shortness Of Breath] : no shortness of breath [Wheezing] : no wheezing [Cough] : no cough [Abdominal Pain] : no abdominal pain [Vomiting] : no vomiting [Constipation] : no constipation [Diarrhea] : no diarrhea [Dysuria] : no dysuria [Joint Swelling] : no joint swelling [Limb Swelling] : no limb swelling [Skin Wound] : no skin wound [Confused] : confusion [As Noted in HPI] : as noted in HPI [Negative] : Heme/Lymph [FreeTextEntry3] : R eye conjunctiva redness, no discharge.

## 2024-09-09 RX ORDER — INSULIN LISPRO-AABC 100 [IU]/ML
100 INJECTION, SOLUTION SUBCUTANEOUS
Qty: 1 | Refills: 3 | Status: ACTIVE | COMMUNITY
Start: 2024-09-09

## 2024-09-10 RX ORDER — ERGOCALCIFEROL 1.25 MG/1
50000 CAPSULE ORAL
Qty: 8 | Refills: 0 | Status: ACTIVE | COMMUNITY
Start: 2024-09-10 | End: 1900-01-01

## 2024-09-11 ENCOUNTER — APPOINTMENT (OUTPATIENT)
Dept: GERIATRICS | Facility: ASSISTED LIVING FACILITY | Age: 79
End: 2024-09-11
Payer: COMMERCIAL

## 2024-09-11 VITALS
OXYGEN SATURATION: 98 % | TEMPERATURE: 97.2 F | SYSTOLIC BLOOD PRESSURE: 144 MMHG | RESPIRATION RATE: 14 BRPM | HEART RATE: 76 BPM | DIASTOLIC BLOOD PRESSURE: 75 MMHG

## 2024-09-11 DIAGNOSIS — F32.A ANXIETY DISORDER, UNSPECIFIED: ICD-10-CM

## 2024-09-11 DIAGNOSIS — K59.01 SLOW TRANSIT CONSTIPATION: ICD-10-CM

## 2024-09-11 DIAGNOSIS — F41.9 ANXIETY DISORDER, UNSPECIFIED: ICD-10-CM

## 2024-09-11 DIAGNOSIS — N95.2 POSTMENOPAUSAL ATROPHIC VAGINITIS: ICD-10-CM

## 2024-09-11 DIAGNOSIS — C25.9 MALIGNANT NEOPLASM OF PANCREAS, UNSPECIFIED: ICD-10-CM

## 2024-09-11 DIAGNOSIS — Z90.410 ACQUIRED TOTAL ABSENCE OF PANCREAS: ICD-10-CM

## 2024-09-11 DIAGNOSIS — I10 ESSENTIAL (PRIMARY) HYPERTENSION: ICD-10-CM

## 2024-09-11 DIAGNOSIS — Z90.49 ACQUIRED TOTAL ABSENCE OF PANCREAS: ICD-10-CM

## 2024-09-11 DIAGNOSIS — H11.30 CONJUNCTIVAL HEMORRHAGE, UNSPECIFIED EYE: ICD-10-CM

## 2024-09-11 PROCEDURE — G2211 COMPLEX E/M VISIT ADD ON: CPT | Mod: NC

## 2024-09-11 PROCEDURE — 99349 HOME/RES VST EST MOD MDM 40: CPT

## 2024-09-11 RX ORDER — CONJUGATED ESTROGENS 0.62 MG/G
0.62 CREAM VAGINAL
Qty: 1 | Refills: 3 | Status: ACTIVE | COMMUNITY
Start: 2024-09-11 | End: 1900-01-01

## 2024-09-12 PROBLEM — C25.9 PANCREATIC CANCER: Status: ACTIVE | Noted: 2024-09-10

## 2024-09-12 PROBLEM — N95.2 VAGINAL ATROPHY: Status: ACTIVE | Noted: 2024-09-11

## 2024-09-12 PROBLEM — Z90.410 HISTORY OF WHIPPLE PROCEDURE: Status: ACTIVE | Noted: 2024-09-10

## 2024-09-12 RX ORDER — ERGOCALCIFEROL (VITAMIN D2) 200 MCG/ML
1 DROPS ORAL
Refills: 0 | DISCHARGE
Start: 2024-09-12 | End: 2024-11-07

## 2024-09-12 NOTE — REVIEW OF SYSTEMS
Physical Therapy    Visit Type: treatment  Precautions:  Medical precautions:  fall risk;. The patient is a 80 year old male who was admitted to [facility] on 10/19/2022 with status post L3-L5 decompressive lumbar laminectomy .  Patient seen on 3 nursing unit.    Spine:     Lumbar: no lifting greater than 10 #, no twisting and no bending  Safety Measures: bed alarm, chair alarm and bed rails  SUBJECTIVE  Patient agreed to participate in therapy this date.  Stated that he has fallen at home a couple times.  Patient / Family Goal: maximize function    Pain     Location: back    At onset of session (out of 10): 4     OBJECTIVE     Cognitive Status   Orientation    - Oriented to: person, place, time and situation        Bed Mobility  - Side-lying to sit: stand by assist  Transfers  Assistive devices: gait belt, 2-wheeled walker, 1 person  - Sit to stand: minimal assist (bed raised)  - Stand to sit: minimal assist  Reported lightheadedness initially sitting edge of bed which resolved w/ rest.  Unsteady performing sit > stand transfer but able to stabilize in standing w/ walker.  Reviewed safe technique.    Ambulation / Gait  - Assistive device: gait belt, two-wheeled walker, 1 person and follow with wheelchair  - Distance (feet unless otherwise indicated): 300  - Assist Level: contact guard/touching/steadying assist  - Surface: even  - Description: step through, forward flexed at hips and narrow base of support  Cues needed to remain closer to walker, for upright posture, and to increase JI.        Interventions     Standing    Lower Extremity: Bilateral: marching, AROM, 10 reps, 1 sets    Rehab Safety  Therapist donned the following PPE for this patient encounter:  Gloves and Surgical Mask    Therapy aide or other healthcare professional present during this patient encounter:   No  If yes, that healthcare professional wore the following PPE: Not Applicable    The patient was wearing a mask during this  session:  Yes    Therapist with patient for approx 30 minutes.          Skilled input: Verbal instruction/cues and tactile instruction/cues  Verbal Consent: Writer verbally educated and received verbal consent for hand placement, positioning of patient, and techniques to be performed today from patient          ASSESSMENT   Impairments: activity tolerance, safety awareness, strength, balance and coordination  Functional Limitations: all functional mobility  Today's treatment focused on transfers, gait.  The patient is demonstrating good progress as evidenced by ability to progress to gait training.           Discharge Recommendations    Recommendation for Discharge: PT IL: Patient is appropriate for Physical Therapy 1-3 times per week, Patient requires 24 HOUR assistance to perform mobility and/or ADLs safely                     Progress: progressing toward goals    • Skilled therapy is required to address these limitations in attempt to maximize the patient's independence.    Patient at End of Session:   Location: in chair  Safety measures: alarm system in place/re-engaged, equipment intact and call light within reach  Handoff to: nurse    PLAN   Suggestions for next session as indicated: Transfers, gait, stairs as needed          PT Frequency: Twice per day    Interventions: bed mobility, functional transfer training, gait training, safety education, strengthening and balance  Agreement to plan and goals: patient agrees with goals and treatment plan        GOALS  Review Date: 11/4/2022  Long Term Goals: (to be met by time of discharge from hospital)  Sit to supine: Patient will complete sit to supine modified independent.  Status: progressing/ongoing  Supine to sit: Patient will complete supine to sit modified independent.  Status: progressing/ongoing  Sit to stand: Patient will complete sit to stand transfer with least restrictive device, modified independent.   Status: progressing/ongoing  Stand to sit: Patient  will complete stand to sit transfer with least restrictive device, modified independent.   Status: progressing/ongoing  Stand pivot: Patient will complete stand pivot transfer with least restrictive device, modified independent.   Status: progressing/ongoing  Ambulation (even): Patient will ambulate on even surface for 100 feet with least restrictive device, modified independent.   Status: progressing/ongoing  3-4 steps: Patient will ambulate 3-4 steps with using 2 rails, modified independent.  Status: progressing/ongoing    Documented in the chart in the following areas: Assessment.      Therapy procedure time and total treatment time can be found documented on the Time Entry flowsheet   [Fever] : no fever [Chills] : no chills [Eye Pain] : no eye pain [Red Eyes] : eyes not red [Discharge From Eyes] : no purulent discharge from the eyes [Nosebleeds] : no nosebleeds [Nasal Discharge] : no nasal discharge [Sore Throat] : no sore throat [Heart Rate Is Fast] : the heart rate was not fast [Chest Pain] : no chest pain [Palpitations] : no palpitations [Shortness Of Breath] : no shortness of breath [Wheezing] : no wheezing [Cough] : no cough [Abdominal Pain] : no abdominal pain [Vomiting] : no vomiting [Constipation] : no constipation [Diarrhea] : no diarrhea [Dysuria] : no dysuria [Pelvic Pain] : no pelvic pain [Vaginal Discharge] : no vaginal discharge [Abn Vaginal Bleeding] : no unexplained vaginal bleeding [Joint Swelling] : no joint swelling [Limb Swelling] : no limb swelling [Skin Wound] : no skin wound [Confused] : confusion [As Noted in HPI] : as noted in HPI [Negative] : Heme/Lymph

## 2024-09-12 NOTE — PHYSICAL EXAM
[Alert] : alert [Sclera] : the sclera and conjunctiva were normal [EOMI] : extraocular movements were intact [Normal Oral Mucosa] : normal oral mucosa [Normal Outer Ear/Nose] : the ears and nose were normal in appearance [Normal Appearance] : the appearance of the neck was normal [Supple] : the neck was supple [No Respiratory Distress] : no respiratory distress [No Acc Muscle Use] : no accessory muscle use [Respiration, Rhythm And Depth] : normal respiratory rhythm and effort [Auscultation Breath Sounds / Voice Sounds] : lungs were clear to auscultation bilaterally [Normal S1, S2] : normal S1 and S2 [Heart Rate And Rhythm] : heart rate was normal and rhythm regular [Edema] : edema was not present [Bowel Sounds] : normal bowel sounds [Abdomen Tenderness] : non-tender [Abdomen Soft] : soft [No Spinal Tenderness] : no spinal tenderness [Normal Gait] : abnormal gait [Involuntary Movements] : no involuntary movements were seen [Normal Color / Pigmentation] : normal skin color and pigmentation [No Focal Deficits] : no focal deficits [Normal Affect] : the affect was normal [Normal Insight/Judgment] : insight and judgment were not intact [Normal Mood] : the mood was normal [de-identified] : Internal labia w/ erythema, no visible lesions or discharge or wounds. Vaginal atrophy noted.

## 2024-09-12 NOTE — ASSESSMENT
[FreeTextEntry1] : - C/w Tresiba to 10u QHS, lispro 5u TIDAC, c/w ISS TIDAC. FSG have been better with this regimen for the past few days.   - Endo f/u on 9/16 - recs appreciated.  - Exam today c/w vaginal atrophy, suspect vaginal pruritus is from this as no lesions visible. Start premarin  - Subconjuctival hemorrhage from prior exams resolved.  - Refer to heme/onc for h/o pancreaitc cancer s/p whipple's surgery ~5 years ago.  - C/w other meds. - RTC 4-8 weeks or prn.

## 2024-09-12 NOTE — HISTORY OF PRESENT ILLNESS
[FreeTextEntry1] : CHAGO ROSAS is a 78 yo woman with PMH of dementia, OP (on prolia), anxiety and depression, T1DM, HTN and ?pancreatic mass who presents for f/u visit at [place] [address] [facility]. Patient is accompanied by ANNE-MARIE RN.   #DM  - She saw endo recently who adjusted Tresiba to 8u QHS 2/2 c/f hypoglycemia.  - Since then, her FSG have been sporadic but more recently predominantly in the high range 300-400's and no hypoglycemia in the am.  - Pt continues to snack intermittently during the day, mostly rice crackers. - Per endo visit summary from 9/2023 that pt's cousin sent, her prior DM regimen was Tresiba 10u QAM, lispro (lyumjev) 5u TIDAC and ISS starting at FSG>150.  - FSG have been better in the 100-200s after further adjustment of tresiba to 10u QHS and lispro to 5u TIDAC and ISS TIDAC.  #Vaginal pruritus - pt endorses vaginal pruritus, not pain, for the past 1 month.    #Pancreatic ca - per POA, pt was dx w/ pancreatic ca about 5 years, was tx w/ whipple's procedure.  ==================================================  She had a fall on the first night she arrived to [facility] on 7/31/24 and was sent to ED [facility] where she received 4 staples on scalp for laceration. Staples were removed on 8/14/24 w/o complications.  Her tresiba was recently adjusted to 12u QHS 2/2 hypoglycemia in the am - since this adjustment her am FSG have improved to 80's. She remains w/ sporadic FSG throughout the day that range from 100s-300s w/ good response to ISS that she remains on. She has endo apt on 8/19. She also eats at different times of the day and different food both at [facility] and outside [facility] when attending outings.   ========================================== History obtained largely from [facility] and ANNE-MARIE RNs.  Patient moved to [facility] on 7/31/24.   Patient is a poor historian. Called HCP (cousin, Minor) but unable to speak today. Plan to further discuss on Friday. Prior to coming to [facility], patient had a fall (unclear when). Injuries are unclear however states "I hurt my back" and she currently has a back brace on. States she feels well and that she does not have any acute medical concerns.   Specialists: Dr. Courtney Sewell -neuro   Dementia:  -unclear when this was diagnosed or if patient follows with neuro -not on meds for dementia -no documented behavioral disturbance  Anxiety/depression: -on lexapro 5mg qd  T1DM: -on Lyumjev insulin prior to meals (sliding scale) -on tresiba 18 units qhs - was receiving this in the morning at [facility]  -just got here today so no blood sugar log for review  HTN: -on amlodipine 2.5 mg and losartan 25mg qd  ?pancreatic mass: -this history is unclear- need collateral history from HCP  -had a Whiple procedure and   ACP: HCP: Minor Wayne ([phone])/ alternate HCP: James Wayne ([phone]) -no MOLST in place   Immunizations: need records COVID-19: Flu: Shingles: Pna: Tdap:   [FreeTextEntry7] : need records [Any fall with injury in past year] : Patient reported fall with injury in the past year [Full assistance needed] : Assistance needed managing medications [Driving Concerns] : not driving or driving without noted concerns [] : Assistance needed managing finances. [de-identified] : 0 [de-identified] : rollator  [NO] : No [0] : 2) Feeling down, depressed, or hopeless: Not at all (0) [PHQ-2 Negative - No further assessment needed] : PHQ-2 Negative - No further assessment needed [EQU3Flbtb] : 0 [With Patient/Caregiver] : , with patient/caregiver [Reviewed no changes] : Reviewed, no changes [AdvancecareDate] : 07/24 [FreeTextEntry4] : HCP: Minor Wayne ([phone])/ alternate HCP: James Wayne ([phone]) no MOLST in place

## 2024-09-14 ENCOUNTER — NON-APPOINTMENT (OUTPATIENT)
Age: 79
End: 2024-09-14

## 2024-09-15 PROBLEM — Z00.00 ENCOUNTER FOR PREVENTIVE HEALTH EXAMINATION: Status: ACTIVE | Noted: 2024-09-15

## 2024-09-16 ENCOUNTER — APPOINTMENT (OUTPATIENT)
Dept: ENDOCRINOLOGY | Facility: CLINIC | Age: 79
End: 2024-09-16
Payer: COMMERCIAL

## 2024-09-16 VITALS — SYSTOLIC BLOOD PRESSURE: 144 MMHG | DIASTOLIC BLOOD PRESSURE: 75 MMHG | WEIGHT: 100 LBS | HEART RATE: 84 BPM

## 2024-09-16 DIAGNOSIS — M81.0 AGE-RELATED OSTEOPOROSIS W/OUT CURRENT PATHOLOGICAL FRACTURE: ICD-10-CM

## 2024-09-16 DIAGNOSIS — E10.9 TYPE 1 DIABETES MELLITUS W/OUT COMPLICATIONS: ICD-10-CM

## 2024-09-16 LAB
GLUCOSE BLDC GLUCOMTR-MCNC: 212
HBA1C MFR BLD HPLC: 7.3

## 2024-09-16 PROCEDURE — G2211 COMPLEX E/M VISIT ADD ON: CPT | Mod: NC

## 2024-09-16 PROCEDURE — 82962 GLUCOSE BLOOD TEST: CPT

## 2024-09-16 PROCEDURE — 99214 OFFICE O/P EST MOD 30 MIN: CPT

## 2024-09-16 PROCEDURE — 83036 HEMOGLOBIN GLYCOSYLATED A1C: CPT | Mod: QW

## 2024-09-16 NOTE — PHYSICAL EXAM
[Alert] : alert [Well Nourished] : well nourished [No Acute Distress] : no acute distress [Well Developed] : well developed [Normal Sclera/Conjunctiva] : normal sclera/conjunctiva [No Proptosis] : no proptosis [Thyroid Not Enlarged] : the thyroid was not enlarged [No Thyroid Nodules] : no palpable thyroid nodules [No Respiratory Distress] : no respiratory distress [No Accessory Muscle Use] : no accessory muscle use [Clear to Auscultation] : lungs were clear to auscultation bilaterally [Normal S1, S2] : normal S1 and S2 [Normal Rate] : heart rate was normal [Regular Rhythm] : with a regular rhythm [No Edema] : no peripheral edema [No Stigmata of Cushings Syndrome] : no stigmata of Cushings Syndrome [No Rash] : no rash [Oriented x3] : oriented to person, place, and time [Acanthosis Nigricans] : no acanthosis nigricans [de-identified] : Elderly woman, ambulating with a walker

## 2024-09-16 NOTE — ASSESSMENT
[FreeTextEntry1] : # Type 1 Diabetes Mellitus, Uncontrolled - Last Hemoglobin A1C: 7.3% - Fingerstick Glucose: The patient did not bring a glucose log to the visit, making it difficult to assess any trends. According to her HCP, her glucose levels have been dropping overnight with her current dose of Tresiba. Her frequency of hypoglycemia has decreased significantly, resulting in an increase in her A1C level. - Management Considerations: Due to the reported overnight decrease in glucose levels, it appears her dose of long-acting insulin may be excessive. Daytime hyperglycemia could be due to inadequate mealtime insulin coverage, versus excessive carbohydrate intake at meals, which are leading to postprandial hyperglycemia despite her taking similar doses to those that previously maintained control. Alternatively, hyperglycemia could be the result of an underlying infection such as vaginitis or a urinary tract infection. - Adjustments:    - Continue with Tresiba 10 units daily for now.   - Increase Lyumjev to 7 units three times daily before meals.   - Maintain Lyumjev low dose sliding scale.   - If postprandial hyperglycemia improves after increasing the premeal insulin dose, there might be a need to adjust the long-acting insulin downwards to 7-8 units, particularly if she starts experiencing overnight or early morning hypoglycemia. Furthermore, occasional postprandial lows might occur if the cause of her postprandial hyperglycemia is the result of excessive carbohydrate intake (she might drop after having a reasonable amount of carbs). She needs to maintain a consistent amount of carbohydrates in each meal. Its difficult to assess given the patient is unable to provide me with an accurate dietary history given memory issues.    - A thorough evaluation for any underlying infections such as vaginitis or a urinary tract infection is recommended. Her caregiver plans to have her seen by a gynecologist for further evaluation of vaginal itchiness.I will forward this information to her primary doctor for further evaluation.  - Microvascular Complications:   - Neuropathy: Monofilament test is within normal limits.   - Nephropathy: Albumin/creatinine ratio stands at 61 mg/g as of 8/19/24.   - Retinopathy: The patient is up to date with her eye exams and exhibits no signs of retinopathy.  # Osteoporosis - The patient is currently receiving Prolia, with her last dose administered in April 2024. The next dose is scheduled for November 1st, 2024.   - Continue Prolia injections.   - Schedule a repeat bone density scan.   - Ensure fall precautions are in place.  Follow-Up: Return to the clinic in 2 weeks.

## 2024-09-16 NOTE — HISTORY OF PRESENT ILLNESS
[FreeTextEntry1] : Ms. Adam Dobson is a 79-year-old woman with a history of type 1 diabetes mellitus, hypertension, osteoporosis (currently on Prolia, with the last dose administered in June 2024), pancreatic cancer (status post-Whipple procedure in 2019), anxiety/depression, and early dementia who presents to the clinic to establish care.  She previously sustained a compression fracture at L1-L2 in June 2024, which required rehabilitation, and she currently resides in a senior facility here in [place]. She used to live in [place] until earlier this year when she moved to [place]. A physician at the facility has been following her. She was referred to me for the management of her diabetes.  Diabetes History: - Age at Diagnosis: 37 years old. - Symptoms at Time of Diagnosis: Weight loss and abdominal pain. She believes she was diagnosed with type 1 diabetes because the initial oral medications were ineffective, leading to a transition to insulin. She doesn't know if antibodies were checked at the time of diagnosis, but her daughter confirms that she was following with an endocrinologist in [place] who at some point checked her insulin levels, which were undetectable. - Current Therapy: Tresiba 12 units daily, and Liumjev sliding scale (no insulin if blood glucose is below 200 mg/dL, unclear dose of insulin if above 200 mg/dL; her daughter doesn't have the scale with her but knows the facility is following this protocol). - History of Other Regimens: She does not recall the name of the pills she was initially prescribed, which she took for less than a year before switching to insulin. She has used various types of insulin over the years but does not remember the specifics. She was taking Tresiba 12 units daily and a standing dose of Liumjev with a sliding scale until earlier this year when she had the lumbar fracture. She doesn't remember the standing dose of Liumjev she was using prior to getting admitted to the facility but reports that this regimen had been working well for a long time. - History of Hypoglycemia: She has experienced multiple episodes of hypoglycemia over the past three weeks since getting admitted to the senior facility. Her hypoglycemic episodes occurred mostly early in the morning. She also reports waking up in the middle of the night because she feels as if her blood glucose is low and has to eat something. - History of DKA/HHS: She reports never having an episode of diabetic ketoacidosis in the past. - Complications: She was last seen by an ophthalmologist less than a year ago, without any signs of retinopathy. She has mild neuropathy symptoms (e.g., numbness/tingling); however, her monofilament test was within normal limits today. - Home FSG: Her glucose levels have been checked 3-4 times daily at the [facility]. Sometimes the bedtime fingerstick is missed, making it harder to interpret. Additionally, her glucose levels fluctuate during the day with occasional hyperglycemia, likely due to the use of a sliding scale before meals (if her glucose is below 200 mg/dL she doesn't get any coverage for meals). Her glucose levels tend to drop overnight (e.g., decreased from 244 mg/dL to 45 mg/dL on August 16-17). Her overnight snacking may also be masking episodes of hypoglycemia that are not apparent in her glucose log (see scanned media). - Her A1C was 6.6% today, which is artifactually given frequent hypoglycemic episodes.  - Diet:   - Breakfast: Scrambled eggs, one piece of toast, fruit (e.g., cantaloupe or blueberries), and coffee with milk.   - Lunch: Roast beef, a slice of rye bread, salad with mushrooms, and fruit (strawberries and blueberries).   - Dinner: Chicken with vegetables and either barley or a baked potato.   - Snacks: Occasionally has a rice cracker. She often wakes up between 11 PM and 3 AM to eat due to low glucose levels, typically consuming rice cakes and juice.  Pancreatic Cancer: She was diagnosed with pancreatic cancer five years ago and underwent a Whipple procedure (partial pancreatectomy). Her last pancreatic imaging was in July 2023, and she was due for a follow-up in July 2024. However, she was unable to schedule it due to being in [place] with medical issues. Her oncologist is based in [place], [place].   Osteoporosis: - Diagnosis: Many years ago. She has been on Prolia for approximately two years. She says that she may have been on pills before but doesn't remember the specifics; she thinks she was switched to Prolia as the pills didn't work. - Bone Density Scan: Her last scan was less than two years ago. - Fractures: Sustained an L1-L2 compression fracture in June 2024 after falling from a standing position. She has also fractured her clavicle, pelvis, and ribs in the past two years. Her most recent fall was on July 30, 2024. - Current Smoking: Denies. - Glucocorticoid Use: Denies. - Rheumatoid Arthritis: Denies. - Alcohol Consumption (3 or more units/day): Denies. - Parental History of Hip Fracture: Unknown.  9/16/24 - The patient arrived with her healthcare provider, Dr. Minor Wayne, who reports that the patient's glucose levels have been elevated, at times reaching 400-500 mg/dL. The facility where the patient resides was supposed to fax her recent fingerstick results, but I have not yet received them. Dr. Wayne noted that the patient's glucose was 483 mg/dL last night, and after administering 10 units of Tresiba, it dropped to 102 mg/dL this morning. She mentioned noting this pattern of glucose levels dropping overnight on previous occasions as well. Her insulin regimen was last adjusted around one week ago. The patient has been waking up frequently at night, drinking water, and urinating often, which is affecting her quality of life. She mentioned some itchiness in her vaginal area but denies any discharge or dysuria. According to Dr. Wayne, the patient had a urinary tract infection a couple of years ago, without dysuria, and she is concerned about a recurrence.  Of note, the patient mentioned that the frequency of hypoglycemia has decreased significantly. She has only had one episode over the past 2 weeks, which happened early in the morning, found shortly after she woke up. She has stopped eating the precautionary snacks she was having at bedtime as her glucose levels are not going as low as before. Her A1C has started to increase as her hypoglycemic episodes have decreased, now at 7.3%  Dr. Wayne also shared the patient's previous medication regimen prior to moving to [place], which included Tresiba 10 units at bedtime, 6 units of Lyumjev before meals, and a low-dose sliding scale as follows: - 101 - 150 = 0 units - 151 - 200 = 1 unit - 201 - 250 = 2 units - 251 - 300 = 3 units  - 301 - 350 = 4 units - 351 - 400 = 5 units   Regarding the patient's osteoporosis treatment, she was initially on Risedronate until January 2016, after which she was switched to Prolia injections every six months starting in February 2017. The reason for the change is unclear, but the patient believes it was because the therapy was no longer effective. Her last Prolia injection was on April 1st, 2024.  She is scheduled to see an oncologist in September 23rd, 2024 given history of pancreatic cancer.

## 2024-09-16 NOTE — ADDENDUM
[FreeTextEntry1] : Time-Based Billing: I have spent 35 minutes on the encounter. This includes time spent with the patient during the visit as well as time spent before and after the visit reviewing the chart, documenting the encounter, making phone calls, reviewing studies, etc.

## 2024-09-16 NOTE — REVIEW OF SYSTEMS
[Fatigue] : fatigue [Polyuria] : polyuria [Recent Weight Gain (___ Lbs)] : no recent weight gain [Recent Weight Loss (___ Lbs)] : no recent weight loss [Fever] : no fever [Chills] : no chills [Chest Pain] : no chest pain [Shortness Of Breath] : no shortness of breath [Nausea] : no nausea [Constipation] : no constipation [Vomiting] : no vomiting [Diarrhea] : no diarrhea [Dysuria] : no dysuria [Vaginal Discharge] : no vaginal discharge

## 2024-09-17 ENCOUNTER — NON-APPOINTMENT (OUTPATIENT)
Age: 79
End: 2024-09-17

## 2024-09-22 ENCOUNTER — NON-APPOINTMENT (OUTPATIENT)
Age: 79
End: 2024-09-22

## 2024-09-23 ENCOUNTER — APPOINTMENT (OUTPATIENT)
Dept: HEMATOLOGY ONCOLOGY | Facility: CLINIC | Age: 79
End: 2024-09-23
Payer: COMMERCIAL

## 2024-09-23 VITALS
WEIGHT: 103 LBS | HEART RATE: 82 BPM | TEMPERATURE: 98.7 F | DIASTOLIC BLOOD PRESSURE: 69 MMHG | OXYGEN SATURATION: 96 % | RESPIRATION RATE: 18 BRPM | SYSTOLIC BLOOD PRESSURE: 159 MMHG

## 2024-09-23 VITALS — HEIGHT: 59 IN | BODY MASS INDEX: 20.8 KG/M2

## 2024-09-23 PROCEDURE — 99204 OFFICE O/P NEW MOD 45 MIN: CPT

## 2024-09-23 RX ORDER — DENOSUMAB 60 MG/ML
60 INJECTION SUBCUTANEOUS
Refills: 0 | Status: ACTIVE | COMMUNITY
Start: 2024-09-23

## 2024-09-23 RX ORDER — CITALOPRAM HYDROBROMIDE 10 MG/1
10 TABLET, FILM COATED ORAL DAILY
Refills: 0 | Status: ACTIVE | COMMUNITY
Start: 2024-09-23

## 2024-09-23 NOTE — ASSESSMENT
[FreeTextEntry1] : Ms. Dobson is a 79 year old woman with past history of pancreatic cancer, status post-curative intent resection and chemotherapy in addition to ongoing medical issues including dementia and DM1. Today we discussed the natural history of pancreatic cancer, risk of recurrence (which is highest in the first 24 months) and marked decline in recurrence risk at the 5-year jorge. Based on most recent prior imaging, peripheral studies and history, low suspicion for recurrent disease. Will obtain CT CAP given 1 year since past studies. This will complete 5 years of imaging based followup (standard per NCCN). Additionally will obtain tumor markers today.   Additionally, will reach out to endocrinology to discuss concerns re: blood sugar management.   #Pancreatic Adenocarcinoma - CT CAP  - CA 19-9 + CEA today - Followup in 1 year

## 2024-09-23 NOTE — HISTORY OF PRESENT ILLNESS
[T: ___] : T[unfilled] [N: ___] : N[unfilled] [de-identified] : Ms. Dobson is a 79 year old woman with history of locally advanced BOONE pancreatic cancer (T2N2) diagnosed 2020 and now status-post Whipple and adjuvant chemotherapy ([place]) with NADINE. Her medical history includings long-standing DMI, and more recently, dementia.   Ms. Dobson presents following excellent care with her local oncologists office where she underwent Whipple procedure in 2020. Due to T2N2 disease with + LVI/PNI (BOONE) she received FOLFIRINOX which was challenging to tolerate. She was transitioned to Gemcitabine/Capecitabine and appears to have an excellent response as she is NADINE almost 5 years later. Most recent imaging was performed 6 months prior 3/2024. Tumor markers have been negative to date.   Currently seeing Dr. Perez and undergoing workup and treatment for dementia. Endocrinology following for DMI management. Per patient and caregiver, has had challenges with blood sugar management since moving to [place]. Concerned with morning lows and hyperglycemia in the PM.  [de-identified] : Pancreatic Adenocarcinoma

## 2024-09-23 NOTE — PHYSICAL EXAM
[Normal] : no JVD, no calf tenderness, venous stasis changes, varices [de-identified] : Elderly woman, overall well. Wheelchair bound.  [de-identified] : Breathing comfortably on room air  [de-identified] : RRR

## 2024-09-24 LAB
CANCER AG19-9 SERPL-ACNC: 12 U/ML
CEA SERPL-MCNC: 3.3 NG/ML

## 2024-09-26 ENCOUNTER — NON-APPOINTMENT (OUTPATIENT)
Age: 79
End: 2024-09-26

## 2024-09-27 ENCOUNTER — APPOINTMENT (OUTPATIENT)
Dept: GERIATRICS | Facility: ASSISTED LIVING FACILITY | Age: 79
End: 2024-09-27

## 2024-09-27 PROCEDURE — 99349 HOME/RES VST EST MOD MDM 40: CPT

## 2024-10-02 ENCOUNTER — NON-APPOINTMENT (OUTPATIENT)
Age: 79
End: 2024-10-02

## 2024-10-04 PROBLEM — E11.9 TYPE 2 DIABETES MELLITUS WITHOUT COMPLICATIONS: Chronic | Status: ACTIVE | Noted: 2024-08-01

## 2024-10-04 PROBLEM — F03.90 UNSPECIFIED DEMENTIA, UNSPECIFIED SEVERITY, WITHOUT BEHAVIORAL DISTURBANCE, PSYCHOTIC DISTURBANCE, MOOD DISTURBANCE, AND ANXIETY: Chronic | Status: ACTIVE | Noted: 2024-08-01

## 2024-10-04 PROBLEM — I10 ESSENTIAL (PRIMARY) HYPERTENSION: Chronic | Status: ACTIVE | Noted: 2024-08-01

## 2024-10-04 PROBLEM — C25.9 MALIGNANT NEOPLASM OF PANCREAS, UNSPECIFIED: Chronic | Status: ACTIVE | Noted: 2024-08-01

## 2024-10-07 ENCOUNTER — APPOINTMENT (OUTPATIENT)
Dept: ENDOCRINOLOGY | Facility: CLINIC | Age: 79
End: 2024-10-07
Payer: COMMERCIAL

## 2024-10-07 VITALS — SYSTOLIC BLOOD PRESSURE: 154 MMHG | HEART RATE: 83 BPM | DIASTOLIC BLOOD PRESSURE: 88 MMHG

## 2024-10-07 DIAGNOSIS — M81.0 AGE-RELATED OSTEOPOROSIS W/OUT CURRENT PATHOLOGICAL FRACTURE: ICD-10-CM

## 2024-10-07 PROBLEM — R35.1 NOCTURIA: Status: ACTIVE | Noted: 2024-10-07

## 2024-10-07 LAB — GLUCOSE BLDC GLUCOMTR-MCNC: 360

## 2024-10-07 PROCEDURE — 99214 OFFICE O/P EST MOD 30 MIN: CPT

## 2024-10-07 PROCEDURE — G2211 COMPLEX E/M VISIT ADD ON: CPT | Mod: NC

## 2024-10-11 ENCOUNTER — NON-APPOINTMENT (OUTPATIENT)
Age: 79
End: 2024-10-11

## 2024-10-16 ENCOUNTER — NON-APPOINTMENT (OUTPATIENT)
Age: 79
End: 2024-10-16

## 2024-10-18 ENCOUNTER — LABORATORY RESULT (OUTPATIENT)
Age: 79
End: 2024-10-18

## 2024-10-18 ENCOUNTER — APPOINTMENT (OUTPATIENT)
Dept: GERIATRICS | Facility: ASSISTED LIVING FACILITY | Age: 79
End: 2024-10-18

## 2024-10-18 DIAGNOSIS — R53.82 CHRONIC FATIGUE, UNSPECIFIED: ICD-10-CM

## 2024-10-18 DIAGNOSIS — R35.1 NOCTURIA: ICD-10-CM

## 2024-10-18 DIAGNOSIS — C25.9 MALIGNANT NEOPLASM OF PANCREAS, UNSPECIFIED: ICD-10-CM

## 2024-10-18 DIAGNOSIS — I10 ESSENTIAL (PRIMARY) HYPERTENSION: ICD-10-CM

## 2024-10-18 DIAGNOSIS — N95.2 POSTMENOPAUSAL ATROPHIC VAGINITIS: ICD-10-CM

## 2024-10-18 PROCEDURE — 99349 HOME/RES VST EST MOD MDM 40: CPT

## 2024-10-20 VITALS — SYSTOLIC BLOOD PRESSURE: 130 MMHG | OXYGEN SATURATION: 96 % | HEART RATE: 73 BPM | DIASTOLIC BLOOD PRESSURE: 80 MMHG

## 2024-10-20 PROBLEM — R53.82 CHRONIC FATIGUE: Status: ACTIVE | Noted: 2024-10-18

## 2024-10-22 ENCOUNTER — LABORATORY RESULT (OUTPATIENT)
Age: 79
End: 2024-10-22

## 2024-10-22 DIAGNOSIS — N39.0 URINARY TRACT INFECTION, SITE NOT SPECIFIED: ICD-10-CM

## 2024-10-22 RX ORDER — SULFAMETHOXAZOLE AND TRIMETHOPRIM 800; 160 MG/1; MG/1
800-160 TABLET ORAL TWICE DAILY
Qty: 14 | Refills: 0 | Status: ACTIVE | COMMUNITY
Start: 2024-10-22 | End: 1900-01-01

## 2024-10-22 RX ORDER — SULFAMETHOXAZOLE AND TRIMETHOPRIM 800; 160 MG/1; MG/1
800-160 TABLET ORAL TWICE DAILY
Qty: 6 | Refills: 0 | Status: ACTIVE | COMMUNITY
Start: 2024-10-22 | End: 1900-01-01

## 2024-10-26 ENCOUNTER — NON-APPOINTMENT (OUTPATIENT)
Age: 79
End: 2024-10-26

## 2024-10-28 ENCOUNTER — RESULT REVIEW (OUTPATIENT)
Age: 79
End: 2024-10-28

## 2024-10-28 ENCOUNTER — APPOINTMENT (OUTPATIENT)
Dept: CT IMAGING | Facility: HOSPITAL | Age: 79
End: 2024-10-28

## 2024-10-28 ENCOUNTER — OUTPATIENT (OUTPATIENT)
Dept: OUTPATIENT SERVICES | Facility: HOSPITAL | Age: 79
LOS: 1 days | End: 2024-10-28
Payer: COMMERCIAL

## 2024-10-28 LAB — POCT ISTAT CREATININE: 2 MG/DL — HIGH (ref 0.5–1.3)

## 2024-10-30 ENCOUNTER — INPATIENT (INPATIENT)
Facility: HOSPITAL | Age: 79
LOS: 4 days | Discharge: HOME | End: 2024-11-04
Attending: HOSPITALIST | Admitting: INTERNAL MEDICINE
Payer: MEDICARE

## 2024-10-30 VITALS
DIASTOLIC BLOOD PRESSURE: 69 MMHG | OXYGEN SATURATION: 98 % | TEMPERATURE: 97 F | SYSTOLIC BLOOD PRESSURE: 122 MMHG | RESPIRATION RATE: 18 BRPM | WEIGHT: 104.94 LBS | HEART RATE: 113 BPM

## 2024-10-30 DIAGNOSIS — Z51.5 ENCOUNTER FOR PALLIATIVE CARE: ICD-10-CM

## 2024-10-30 DIAGNOSIS — G93.41 METABOLIC ENCEPHALOPATHY: ICD-10-CM

## 2024-10-30 DIAGNOSIS — Z71.89 OTHER SPECIFIED COUNSELING: ICD-10-CM

## 2024-10-30 DIAGNOSIS — N17.9 ACUTE KIDNEY FAILURE, UNSPECIFIED: ICD-10-CM

## 2024-10-30 DIAGNOSIS — R53.81 OTHER MALAISE: ICD-10-CM

## 2024-10-30 DIAGNOSIS — Z90.410 ACQUIRED TOTAL ABSENCE OF PANCREAS: Chronic | ICD-10-CM

## 2024-10-30 DIAGNOSIS — E10.10 TYPE 1 DIABETES MELLITUS WITH KETOACIDOSIS WITHOUT COMA: ICD-10-CM

## 2024-10-30 LAB
ADD ON TEST-SPECIMEN IN LAB: SIGNIFICANT CHANGE UP
ALBUMIN SERPL ELPH-MCNC: 4.6 G/DL — SIGNIFICANT CHANGE UP (ref 3.3–5)
ALP SERPL-CCNC: 147 U/L — HIGH (ref 40–120)
ALT FLD-CCNC: 68 U/L — HIGH (ref 10–45)
ANION GAP SERPL CALC-SCNC: 25 MMOL/L — HIGH (ref 5–17)
ANION GAP SERPL CALC-SCNC: 26 MMOL/L — HIGH (ref 5–17)
ANION GAP SERPL CALC-SCNC: 32 MMOL/L — HIGH (ref 5–17)
ANION GAP SERPL CALC-SCNC: 8 MMOL/L — SIGNIFICANT CHANGE UP (ref 5–17)
ANION GAP SERPL CALC-SCNC: 9 MMOL/L — SIGNIFICANT CHANGE UP (ref 5–17)
APPEARANCE UR: CLEAR — SIGNIFICANT CHANGE UP
AST SERPL-CCNC: 162 U/L — HIGH (ref 10–40)
B-OH-BUTYR SERPL-SCNC: 6 MMOL/L — HIGH
B-OH-BUTYR SERPL-SCNC: > 8 MMOL/L — SIGNIFICANT CHANGE UP
BACTERIA # UR AUTO: NEGATIVE /HPF — SIGNIFICANT CHANGE UP
BASE EXCESS BLDV CALC-SCNC: -15.8 MMOL/L — LOW (ref -2–3)
BASE EXCESS BLDV CALC-SCNC: -25.3 MMOL/L — LOW (ref -2–3)
BASE EXCESS BLDV CALC-SCNC: 1.5 MMOL/L — SIGNIFICANT CHANGE UP (ref -2–3)
BASOPHILS # BLD AUTO: 0.01 K/UL — SIGNIFICANT CHANGE UP (ref 0–0.2)
BASOPHILS NFR BLD AUTO: 0.1 % — SIGNIFICANT CHANGE UP (ref 0–2)
BILIRUB SERPL-MCNC: 0.2 MG/DL — SIGNIFICANT CHANGE UP (ref 0.2–1.2)
BILIRUB UR-MCNC: NEGATIVE — SIGNIFICANT CHANGE UP
BUN SERPL-MCNC: 40 MG/DL — HIGH (ref 7–23)
BUN SERPL-MCNC: 43 MG/DL — HIGH (ref 7–23)
BUN SERPL-MCNC: 47 MG/DL — HIGH (ref 7–23)
BUN SERPL-MCNC: 48 MG/DL — HIGH (ref 7–23)
BUN SERPL-MCNC: 53 MG/DL — HIGH (ref 7–23)
CA-I SERPL-SCNC: 1.33 MMOL/L — SIGNIFICANT CHANGE UP (ref 1.15–1.33)
CA-I SERPL-SCNC: 1.35 MMOL/L — HIGH (ref 1.15–1.33)
CA-I SERPL-SCNC: 1.44 MMOL/L — HIGH (ref 1.15–1.33)
CALCIUM SERPL-MCNC: 10.1 MG/DL — SIGNIFICANT CHANGE UP (ref 8.4–10.5)
CALCIUM SERPL-MCNC: 10.2 MG/DL — SIGNIFICANT CHANGE UP (ref 8.4–10.5)
CALCIUM SERPL-MCNC: 9.2 MG/DL — SIGNIFICANT CHANGE UP (ref 8.4–10.5)
CALCIUM SERPL-MCNC: 9.5 MG/DL — SIGNIFICANT CHANGE UP (ref 8.4–10.5)
CALCIUM SERPL-MCNC: 9.8 MG/DL — SIGNIFICANT CHANGE UP (ref 8.4–10.5)
CAST: 2 /LPF — SIGNIFICANT CHANGE UP (ref 0–4)
CHLORIDE SERPL-SCNC: 103 MMOL/L — SIGNIFICANT CHANGE UP (ref 96–108)
CHLORIDE SERPL-SCNC: 104 MMOL/L — SIGNIFICANT CHANGE UP (ref 96–108)
CHLORIDE SERPL-SCNC: 85 MMOL/L — LOW (ref 96–108)
CHLORIDE SERPL-SCNC: 96 MMOL/L — SIGNIFICANT CHANGE UP (ref 96–108)
CHLORIDE SERPL-SCNC: 97 MMOL/L — SIGNIFICANT CHANGE UP (ref 96–108)
CK MB CFR SERPL CALC: 72.8 NG/ML — HIGH (ref 0–6.7)
CK SERPL-CCNC: 702 U/L — HIGH (ref 25–170)
CO2 BLDV-SCNC: 11 MMOL/L — LOW (ref 22–26)
CO2 BLDV-SCNC: 27 MMOL/L — HIGH (ref 22–26)
CO2 BLDV-SCNC: 5 MMOL/L — CRITICAL LOW (ref 22–26)
CO2 SERPL-SCNC: 10 MMOL/L — CRITICAL LOW (ref 22–31)
CO2 SERPL-SCNC: 11 MMOL/L — LOW (ref 22–31)
CO2 SERPL-SCNC: 25 MMOL/L — SIGNIFICANT CHANGE UP (ref 22–31)
CO2 SERPL-SCNC: 28 MMOL/L — SIGNIFICANT CHANGE UP (ref 22–31)
CO2 SERPL-SCNC: 4 MMOL/L — CRITICAL LOW (ref 22–31)
COLOR SPEC: YELLOW — SIGNIFICANT CHANGE UP
CREAT ?TM UR-MCNC: 28 MG/DL — SIGNIFICANT CHANGE UP
CREAT SERPL-MCNC: 2.07 MG/DL — HIGH (ref 0.5–1.3)
CREAT SERPL-MCNC: 2.21 MG/DL — HIGH (ref 0.5–1.3)
CREAT SERPL-MCNC: 2.46 MG/DL — HIGH (ref 0.5–1.3)
CREAT SERPL-MCNC: 2.49 MG/DL — HIGH (ref 0.5–1.3)
CREAT SERPL-MCNC: 2.58 MG/DL — HIGH (ref 0.5–1.3)
DIFF PNL FLD: ABNORMAL
EGFR: 18 ML/MIN/1.73M2 — LOW
EGFR: 19 ML/MIN/1.73M2 — LOW
EGFR: 20 ML/MIN/1.73M2 — LOW
EGFR: 22 ML/MIN/1.73M2 — LOW
EGFR: 24 ML/MIN/1.73M2 — LOW
EOSINOPHIL # BLD AUTO: 0 K/UL — SIGNIFICANT CHANGE UP (ref 0–0.5)
EOSINOPHIL NFR BLD AUTO: 0 % — SIGNIFICANT CHANGE UP (ref 0–6)
GAS PNL BLDV: 124 MMOL/L — LOW (ref 136–145)
GAS PNL BLDV: 130 MMOL/L — LOW (ref 136–145)
GAS PNL BLDV: 138 MMOL/L — SIGNIFICANT CHANGE UP (ref 136–145)
GAS PNL BLDV: SIGNIFICANT CHANGE UP
GLUCOSE BLDC GLUCOMTR-MCNC: 137 MG/DL — HIGH (ref 70–99)
GLUCOSE BLDC GLUCOMTR-MCNC: 176 MG/DL — HIGH (ref 70–99)
GLUCOSE BLDC GLUCOMTR-MCNC: 183 MG/DL — HIGH (ref 70–99)
GLUCOSE BLDC GLUCOMTR-MCNC: 222 MG/DL — HIGH (ref 70–99)
GLUCOSE BLDC GLUCOMTR-MCNC: 226 MG/DL — HIGH (ref 70–99)
GLUCOSE BLDC GLUCOMTR-MCNC: 240 MG/DL — HIGH (ref 70–99)
GLUCOSE BLDC GLUCOMTR-MCNC: 250 MG/DL — HIGH (ref 70–99)
GLUCOSE BLDC GLUCOMTR-MCNC: 315 MG/DL — HIGH (ref 70–99)
GLUCOSE BLDC GLUCOMTR-MCNC: 410 MG/DL — HIGH (ref 70–99)
GLUCOSE BLDC GLUCOMTR-MCNC: 530 MG/DL — CRITICAL HIGH (ref 70–99)
GLUCOSE BLDC GLUCOMTR-MCNC: >600 MG/DL — CRITICAL HIGH (ref 70–99)
GLUCOSE SERPL-MCNC: 1008 MG/DL — CRITICAL HIGH (ref 70–99)
GLUCOSE SERPL-MCNC: 223 MG/DL — HIGH (ref 70–99)
GLUCOSE SERPL-MCNC: 285 MG/DL — HIGH (ref 70–99)
GLUCOSE SERPL-MCNC: 657 MG/DL — CRITICAL HIGH (ref 70–99)
GLUCOSE SERPL-MCNC: 662 MG/DL — CRITICAL HIGH (ref 70–99)
GLUCOSE UR QL: >=1000 MG/DL
HCO3 BLDV-SCNC: 10 MMOL/L — CRITICAL LOW (ref 22–29)
HCO3 BLDV-SCNC: 26 MMOL/L — SIGNIFICANT CHANGE UP (ref 22–29)
HCO3 BLDV-SCNC: 4 MMOL/L — CRITICAL LOW (ref 22–29)
HCT VFR BLD CALC: 32.2 % — LOW (ref 34.5–45)
HCT VFR BLD CALC: 33.2 % — LOW (ref 34.5–45)
HGB BLD-MCNC: 9.5 G/DL — LOW (ref 11.5–15.5)
HGB BLD-MCNC: 9.9 G/DL — LOW (ref 11.5–15.5)
IMM GRANULOCYTES NFR BLD AUTO: 0.7 % — SIGNIFICANT CHANGE UP (ref 0–0.9)
KETONES UR-MCNC: 40 MG/DL
LACTATE SERPL-SCNC: 3.2 MMOL/L — HIGH (ref 0.5–2)
LACTATE SERPL-SCNC: 3.5 MMOL/L — HIGH (ref 0.5–2)
LACTATE SERPL-SCNC: 3.5 MMOL/L — HIGH (ref 0.5–2)
LDH SERPL L TO P-CCNC: 651 U/L — HIGH (ref 50–242)
LEUKOCYTE ESTERASE UR-ACNC: NEGATIVE — SIGNIFICANT CHANGE UP
LYMPHOCYTES # BLD AUTO: 0.55 K/UL — LOW (ref 1–3.3)
LYMPHOCYTES # BLD AUTO: 6.2 % — LOW (ref 13–44)
MAGNESIUM SERPL-MCNC: 2.1 MG/DL — SIGNIFICANT CHANGE UP (ref 1.6–2.6)
MAGNESIUM SERPL-MCNC: 2.1 MG/DL — SIGNIFICANT CHANGE UP (ref 1.6–2.6)
MAGNESIUM SERPL-MCNC: 2.4 MG/DL — SIGNIFICANT CHANGE UP (ref 1.6–2.6)
MAGNESIUM SERPL-MCNC: 2.4 MG/DL — SIGNIFICANT CHANGE UP (ref 1.6–2.6)
MAGNESIUM SERPL-MCNC: 2.5 MG/DL — SIGNIFICANT CHANGE UP (ref 1.6–2.6)
MCHC RBC-ENTMCNC: 27.8 PG — SIGNIFICANT CHANGE UP (ref 27–34)
MCHC RBC-ENTMCNC: 28 PG — SIGNIFICANT CHANGE UP (ref 27–34)
MCHC RBC-ENTMCNC: 28.6 G/DL — LOW (ref 32–36)
MCHC RBC-ENTMCNC: 30.7 G/DL — LOW (ref 32–36)
MCV RBC AUTO: 91 FL — SIGNIFICANT CHANGE UP (ref 80–100)
MCV RBC AUTO: 97.1 FL — SIGNIFICANT CHANGE UP (ref 80–100)
MONOCYTES # BLD AUTO: 0.45 K/UL — SIGNIFICANT CHANGE UP (ref 0–0.9)
MONOCYTES NFR BLD AUTO: 5.1 % — SIGNIFICANT CHANGE UP (ref 2–14)
NEUTROPHILS # BLD AUTO: 7.83 K/UL — HIGH (ref 1.8–7.4)
NEUTROPHILS NFR BLD AUTO: 87.9 % — HIGH (ref 43–77)
NITRITE UR-MCNC: NEGATIVE — SIGNIFICANT CHANGE UP
NRBC # BLD: 0 /100 WBCS — SIGNIFICANT CHANGE UP (ref 0–0)
NRBC # BLD: 0 /100 WBCS — SIGNIFICANT CHANGE UP (ref 0–0)
OSMOLALITY UR: 450 MOSM/KG — SIGNIFICANT CHANGE UP (ref 300–900)
PCO2 BLDV: 18 MMHG — LOW (ref 39–42)
PCO2 BLDV: 25 MMHG — LOW (ref 39–42)
PCO2 BLDV: 39 MMHG — SIGNIFICANT CHANGE UP (ref 39–42)
PH BLDV: 7 — CRITICAL LOW (ref 7.32–7.43)
PH BLDV: 7.22 — LOW (ref 7.32–7.43)
PH BLDV: 7.43 — SIGNIFICANT CHANGE UP (ref 7.32–7.43)
PH UR: 5.5 — SIGNIFICANT CHANGE UP (ref 5–8)
PHOSPHATE SERPL-MCNC: 3 MG/DL — SIGNIFICANT CHANGE UP (ref 2.5–4.5)
PHOSPHATE SERPL-MCNC: 3.2 MG/DL — SIGNIFICANT CHANGE UP (ref 2.5–4.5)
PHOSPHATE SERPL-MCNC: 4.7 MG/DL — HIGH (ref 2.5–4.5)
PHOSPHATE SERPL-MCNC: 4.7 MG/DL — HIGH (ref 2.5–4.5)
PHOSPHATE SERPL-MCNC: 8.7 MG/DL — HIGH (ref 2.5–4.5)
PLATELET # BLD AUTO: 260 K/UL — SIGNIFICANT CHANGE UP (ref 150–400)
PLATELET # BLD AUTO: 308 K/UL — SIGNIFICANT CHANGE UP (ref 150–400)
PO2 BLDV: 39 MMHG — SIGNIFICANT CHANGE UP (ref 25–45)
PO2 BLDV: 60 MMHG — HIGH (ref 25–45)
PO2 BLDV: <33 MMHG — SIGNIFICANT CHANGE UP (ref 25–45)
POTASSIUM BLDV-SCNC: 4 MMOL/L — SIGNIFICANT CHANGE UP (ref 3.5–5.1)
POTASSIUM BLDV-SCNC: 5 MMOL/L — SIGNIFICANT CHANGE UP (ref 3.5–5.1)
POTASSIUM BLDV-SCNC: 6.6 MMOL/L — CRITICAL HIGH (ref 3.5–5.1)
POTASSIUM SERPL-MCNC: 3.9 MMOL/L — SIGNIFICANT CHANGE UP (ref 3.5–5.3)
POTASSIUM SERPL-MCNC: 4 MMOL/L — SIGNIFICANT CHANGE UP (ref 3.5–5.3)
POTASSIUM SERPL-MCNC: 4.9 MMOL/L — SIGNIFICANT CHANGE UP (ref 3.5–5.3)
POTASSIUM SERPL-MCNC: 5 MMOL/L — SIGNIFICANT CHANGE UP (ref 3.5–5.3)
POTASSIUM SERPL-MCNC: 6.3 MMOL/L — CRITICAL HIGH (ref 3.5–5.3)
POTASSIUM SERPL-SCNC: 3.9 MMOL/L — SIGNIFICANT CHANGE UP (ref 3.5–5.3)
POTASSIUM SERPL-SCNC: 4 MMOL/L — SIGNIFICANT CHANGE UP (ref 3.5–5.3)
POTASSIUM SERPL-SCNC: 4.9 MMOL/L — SIGNIFICANT CHANGE UP (ref 3.5–5.3)
POTASSIUM SERPL-SCNC: 5 MMOL/L — SIGNIFICANT CHANGE UP (ref 3.5–5.3)
POTASSIUM SERPL-SCNC: 6.3 MMOL/L — CRITICAL HIGH (ref 3.5–5.3)
POTASSIUM UR-SCNC: 23 MMOL/L — SIGNIFICANT CHANGE UP
PROT ?TM UR-MCNC: 13 MG/DL — HIGH (ref 0–12)
PROT SERPL-MCNC: 7.7 G/DL — SIGNIFICANT CHANGE UP (ref 6–8.3)
PROT UR-MCNC: 30 MG/DL
PROT/CREAT UR-RTO: 0.5 RATIO — HIGH (ref 0–0.2)
RBC # BLD: 3.42 M/UL — LOW (ref 3.8–5.2)
RBC # BLD: 3.54 M/UL — LOW (ref 3.8–5.2)
RBC # FLD: 15.2 % — HIGH (ref 10.3–14.5)
RBC # FLD: 15.8 % — HIGH (ref 10.3–14.5)
RBC CASTS # UR COMP ASSIST: 1 /HPF — SIGNIFICANT CHANGE UP (ref 0–4)
SAO2 % BLDV: 30.1 % — LOW (ref 67–88)
SAO2 % BLDV: 62.6 % — LOW (ref 67–88)
SAO2 % BLDV: 77 % — SIGNIFICANT CHANGE UP (ref 67–88)
SODIUM SERPL-SCNC: 121 MMOL/L — LOW (ref 135–145)
SODIUM SERPL-SCNC: 132 MMOL/L — LOW (ref 135–145)
SODIUM SERPL-SCNC: 133 MMOL/L — LOW (ref 135–145)
SODIUM SERPL-SCNC: 137 MMOL/L — SIGNIFICANT CHANGE UP (ref 135–145)
SODIUM SERPL-SCNC: 140 MMOL/L — SIGNIFICANT CHANGE UP (ref 135–145)
SODIUM UR-SCNC: 34 MMOL/L — SIGNIFICANT CHANGE UP
SP GR SPEC: 1.02 — SIGNIFICANT CHANGE UP (ref 1–1.03)
SQUAMOUS # UR AUTO: 5 /HPF — SIGNIFICANT CHANGE UP (ref 0–5)
UROBILINOGEN FLD QL: 0.2 MG/DL — SIGNIFICANT CHANGE UP (ref 0.2–1)
UUN UR-MCNC: 276 MG/DL — SIGNIFICANT CHANGE UP
WBC # BLD: 8.9 K/UL — SIGNIFICANT CHANGE UP (ref 3.8–10.5)
WBC # BLD: 9.85 K/UL — SIGNIFICANT CHANGE UP (ref 3.8–10.5)
WBC # FLD AUTO: 8.9 K/UL — SIGNIFICANT CHANGE UP (ref 3.8–10.5)
WBC # FLD AUTO: 9.85 K/UL — SIGNIFICANT CHANGE UP (ref 3.8–10.5)
WBC UR QL: 2 /HPF — SIGNIFICANT CHANGE UP (ref 0–5)

## 2024-10-30 RX ORDER — INSULIN LISPRO 100/ML
5 VIAL (ML) SUBCUTANEOUS
Refills: 0 | Status: DISCONTINUED | OUTPATIENT
Start: 2024-10-31 | End: 2024-10-31

## 2024-10-30 RX ORDER — CALCIUM GLUCONATE 98 MG/ML
1 INJECTION, SOLUTION INTRAVENOUS ONCE
Refills: 0 | Status: DISCONTINUED | OUTPATIENT
Start: 2024-10-30 | End: 2024-10-30

## 2024-10-30 RX ORDER — AMLODIPINE BESYLATE 10 MG
1 TABLET ORAL
Refills: 0 | DISCHARGE

## 2024-10-30 RX ORDER — INSULIN REG, HUM S-S BUFF 100/ML
4.76 VIAL (ML) INJECTION
Qty: 100 | Refills: 0 | Status: DISCONTINUED | OUTPATIENT
Start: 2024-10-30 | End: 2024-10-31

## 2024-10-30 RX ORDER — QUETIAPINE FUMARATE 200 MG/1
12.5 TABLET ORAL ONCE
Refills: 0 | Status: COMPLETED | OUTPATIENT
Start: 2024-10-30 | End: 2024-10-30

## 2024-10-30 RX ORDER — DOCUSATE SODIUM 100 MG
1 CAPSULE ORAL
Refills: 0 | DISCHARGE

## 2024-10-30 RX ORDER — ESCITALOPRAM 10 MG/1
5 TABLET, FILM COATED ORAL DAILY
Refills: 0 | Status: DISCONTINUED | OUTPATIENT
Start: 2024-10-30 | End: 2024-11-04

## 2024-10-30 RX ORDER — CALCIUM GLUCONATE 98 MG/ML
2 INJECTION, SOLUTION INTRAVENOUS ONCE
Refills: 0 | Status: COMPLETED | OUTPATIENT
Start: 2024-10-30 | End: 2024-10-30

## 2024-10-30 RX ORDER — INSULIN GLARGINE,HUM.REC.ANLOG 100/ML
15 VIAL (ML) SUBCUTANEOUS ONCE
Refills: 0 | Status: COMPLETED | OUTPATIENT
Start: 2024-10-30 | End: 2024-10-30

## 2024-10-30 RX ORDER — GLUCAGON INJECTION, SOLUTION 1 MG/.2ML
1 INJECTION, SOLUTION SUBCUTANEOUS ONCE
Refills: 0 | Status: DISCONTINUED | OUTPATIENT
Start: 2024-10-30 | End: 2024-11-04

## 2024-10-30 RX ORDER — INSULIN REG, HUM S-S BUFF 100/ML
5 VIAL (ML) INJECTION ONCE
Refills: 0 | Status: DISCONTINUED | OUTPATIENT
Start: 2024-10-30 | End: 2024-10-30

## 2024-10-30 RX ORDER — RIVASTIGMINE TARTRATE 4.5 MG/1
1 CAPSULE ORAL
Refills: 0 | DISCHARGE

## 2024-10-30 RX ORDER — SODIUM CHLORIDE 9 MG/ML
1000 INJECTION, SOLUTION INTRAMUSCULAR; INTRAVENOUS; SUBCUTANEOUS ONCE
Refills: 0 | Status: COMPLETED | OUTPATIENT
Start: 2024-10-30 | End: 2024-10-30

## 2024-10-30 RX ORDER — SODIUM BICARBONATE 1 MEQ/ML
50 VIAL (ML) INTRAVENOUS
Refills: 0 | Status: COMPLETED | OUTPATIENT
Start: 2024-10-30 | End: 2024-10-30

## 2024-10-30 RX ORDER — CONJUGATED ESTROGENS 0.62 MG/G
0.5 CREAM VAGINAL
Refills: 0 | DISCHARGE

## 2024-10-30 RX ORDER — CHLORHEXIDINE GLUCONATE 40 MG/ML
1 SOLUTION TOPICAL
Refills: 0 | Status: DISCONTINUED | OUTPATIENT
Start: 2024-10-30 | End: 2024-11-04

## 2024-10-30 RX ORDER — SORBITOL SOLUTION 70 %
5 SOLUTION, ORAL MISCELLANEOUS
Refills: 0 | DISCHARGE

## 2024-10-30 RX ORDER — INSULIN REG, HUM S-S BUFF 100/ML
5 VIAL (ML) INJECTION ONCE
Refills: 0 | Status: COMPLETED | OUTPATIENT
Start: 2024-10-30 | End: 2024-10-30

## 2024-10-30 RX ORDER — HEPARIN SODIUM 10000 [USP'U]/ML
5000 INJECTION INTRAVENOUS; SUBCUTANEOUS EVERY 12 HOURS
Refills: 0 | Status: DISCONTINUED | OUTPATIENT
Start: 2024-10-30 | End: 2024-11-04

## 2024-10-30 RX ORDER — LOSARTAN POTASSIUM 25 MG/1
25 TABLET ORAL DAILY
Refills: 0 | Status: DISCONTINUED | OUTPATIENT
Start: 2024-10-30 | End: 2024-11-04

## 2024-10-30 RX ORDER — LOSARTAN POTASSIUM 25 MG/1
1 TABLET ORAL
Refills: 0 | DISCHARGE

## 2024-10-30 RX ORDER — ESCITALOPRAM 10 MG/1
1 TABLET, FILM COATED ORAL
Refills: 0 | DISCHARGE

## 2024-10-30 RX ORDER — AMLODIPINE BESYLATE 10 MG
2.5 TABLET ORAL AT BEDTIME
Refills: 0 | Status: DISCONTINUED | OUTPATIENT
Start: 2024-10-30 | End: 2024-11-04

## 2024-10-30 RX ADMIN — CALCIUM GLUCONATE 200 GRAM(S): 98 INJECTION, SOLUTION INTRAVENOUS at 11:18

## 2024-10-30 RX ADMIN — Medication 200 MILLILITER(S): at 12:51

## 2024-10-30 RX ADMIN — Medication 200 MILLILITER(S): at 18:19

## 2024-10-30 RX ADMIN — Medication 50 MILLIEQUIVALENT(S): at 13:27

## 2024-10-30 RX ADMIN — Medication 5 UNIT(S): at 13:20

## 2024-10-30 RX ADMIN — LOSARTAN POTASSIUM 25 MILLIGRAM(S): 25 TABLET ORAL at 18:25

## 2024-10-30 RX ADMIN — SODIUM CHLORIDE 1000 MILLILITER(S): 9 INJECTION, SOLUTION INTRAMUSCULAR; INTRAVENOUS; SUBCUTANEOUS at 09:59

## 2024-10-30 RX ADMIN — Medication 4.76 UNIT(S)/HR: at 10:58

## 2024-10-30 RX ADMIN — ESCITALOPRAM 5 MILLIGRAM(S): 10 TABLET, FILM COATED ORAL at 18:22

## 2024-10-30 RX ADMIN — Medication 50 MILLIEQUIVALENT(S): at 12:50

## 2024-10-30 RX ADMIN — QUETIAPINE FUMARATE 12.5 MILLIGRAM(S): 200 TABLET ORAL at 18:20

## 2024-10-30 RX ADMIN — Medication 15 UNIT(S): at 23:47

## 2024-10-30 RX ADMIN — Medication 50 MILLIEQUIVALENT(S): at 13:14

## 2024-10-30 RX ADMIN — HEPARIN SODIUM 5000 UNIT(S): 10000 INJECTION INTRAVENOUS; SUBCUTANEOUS at 18:20

## 2024-10-30 RX ADMIN — SODIUM CHLORIDE 1000 MILLILITER(S): 9 INJECTION, SOLUTION INTRAMUSCULAR; INTRAVENOUS; SUBCUTANEOUS at 10:45

## 2024-10-30 NOTE — PROVIDER CONTACT NOTE (CRITICAL VALUE NOTIFICATION) - BACKGROUND
pt came from ED admitted for DKA; confused EMS called by Health care proxy; came from assisted living

## 2024-10-30 NOTE — ED ADULT NURSE NOTE - OBJECTIVE STATEMENT
Pt is a 78yo female PMHx DM1, dementia presenting to ED c/o hyperglycemia. Pt confused, unable to answer nursing questions. Pt healthcare proxy at bedside Pt is a 80yo female PMHx DM1, dementia presenting to ED c/o hyperglycemia. Pt confused, unable to answer nursing questions. Pt healthcare proxy at bedside, states, "Damien seems a lot more disoriented last night when I saw her, her skin looks better today like she has more color but she is more confused and drowsy." Pt baseline mental status is A&Ox4, pt is A&Ox2, disoriented to time and place, breathing even and labored with abdominal muscle use and accessory muscle use, tachypneic with RR 28, ambulatory with walker.

## 2024-10-30 NOTE — PROVIDER CONTACT NOTE (CRITICAL VALUE NOTIFICATION) - ACTION/TREATMENT ORDERED:
No intervention at this time. Continue to monitor glucose and adjust insulin gtt as needed.
MD Aware; Will order LR bolus; and insulin IVP;

## 2024-10-30 NOTE — H&P ADULT - NSHPLABSRESULTS_GEN_ALL_CORE
.  LABS:                         9.9    8.90  )-----------( 260      ( 30 Oct 2024 12:30 )             32.2     10-30    133[L]  |  97  |  48[H]  ----------------------------<  662[HH]  4.9   |  10[LL]  |  2.49[H]    Ca    10.2      30 Oct 2024 12:30  Phos  4.7     10-30  Mg     2.4     10-30    TPro  7.7  /  Alb  4.6  /  TBili  0.2  /  DBili  x   /  AST  162[H]  /  ALT  68[H]  /  AlkPhos  147[H]  10-30      Urinalysis Basic - ( 30 Oct 2024 12:30 )    Color: x / Appearance: x / SG: x / pH: x  Gluc: 662 mg/dL / Ketone: x  / Bili: x / Urobili: x   Blood: x / Protein: x / Nitrite: x   Leuk Esterase: x / RBC: x / WBC x   Sq Epi: x / Non Sq Epi: x / Bacteria: x            Lactate, Blood: 3.2 mmol/L (10-30 @ 16:07)  Lactate, Blood: 3.5 mmol/L (10-30 @ 12:30)  Lactate, Blood: 3.5 mmol/L (10-30 @ 12:26)      RADIOLOGY, EKG & ADDITIONAL TESTS: Reviewed.

## 2024-10-30 NOTE — ED PROVIDER NOTE - PHYSICAL EXAMINATION
VITAL SIGNS: I have reviewed nursing notes and confirm.  CONSTITUTIONAL:  in no acute distress. sleepy but wakes to touch  SKIN:  warm and dry, no acute rash.   HEAD:  normocephalic, atraumatic.  EYES: PERRL, EOM intact; conjunctiva and sclera clear.  ENT: No nasal discharge; airway clear.   NECK: Supple; non tender.  CARD: S1, S2 normal; no murmurs, gallops, or rubs. Regular rate and rhythm.   RESP:  Clear to auscultation b/l, no wheezes, rales or rhonchi.  ABD: Normal bowel sounds; soft; non-distended; non-tender; no guarding/ rebound.  MSK: Normal ROM. No clubbing, cyanosis or edema. no ttp bilat le  NEURO: as above, grossly unremarkable  PSYCH: Cooperative, mood and affect appropriate.

## 2024-10-30 NOTE — PROVIDER CONTACT NOTE (CRITICAL VALUE NOTIFICATION) - TEST AND RESULT REPORTED:
Lactate 3.5, Bicarb 10
Glucose: 662, CO2: 10
glucose 1008, K 6.3, co2 4
pH 7.0, PCo2 5, Bicarb 4, K 6.6

## 2024-10-30 NOTE — CONSULT NOTE ADULT - SUBJECTIVE AND OBJECTIVE BOX
79 year old woman, [place] [address] resident, with PMHx of dementia, osteoporosis (on Prolia), anxiety and depression, T1DM, HTN and history of locally advanced HARSHA pancreatic cancer (T2N2) diagnosed  and now status-post Whipple and adjuvant chemotherapy ([place]) with PIEDAD.    HCP: Nirali Ivonne ([phone])/ alternate HCP: Melvin Coronado ([phone])    On Bactrim since 10/23 for UTI  ?  Current OUTPATIENT Meds  amLODIPine Besylate 2.5 MG Oral Tablet; TAKE 1 TABLET BY MOUTH EVERY DAY  BD Pen Needle Karen 2nd Gen 32G X 4 MM  Docusate Sodium 100 MG Oral Capsule; TAKE 1 CAPSULE 3 TIMES DAILY  Escitalopram Oxalate 5 MG Oral Tablet  Losartan Potassium 25 MG Oral Tablet; TAKE 1 TABLET BY MOUTH EVERY DAY  Lyumjev KwikPen 100 UNIT/ML Subcutaneous Solution Pen-injector; INJECT BEFORE 5  UNITS  3 TIMES A DAY BEFORE MEALS  Lyumjev KwikPen 100 UNIT/ML Subcutaneous Solution Pen-injector; INJECT BEFORE  MEALS AS PER SLIDING SCALE: if 0-200=0 units; 201-250=2 units; 251-300= 4 units;  301-350= 6 units; 351-400= 8 units  Premarin 0.625 MG/GM Vaginal Cream; Apply 0.5 g of cream (small amount)  intravaginally daily for 2 weeks, then reduce to twice weekly  Prolia 60 MG/ML Subcutaneous Solution Prefilled Syringe  Rivastigmine 4.6 MG/24HR Transdermal Patch 24 Hour; APPLY 1 PATCH DAILY AS  DIRECTED  Tresiba FlexTouch 100 UNIT/ML Subcutaneous Solution Pen-injector; INJECT 8 UNIT  Daily  Vitamin D (Cholecalciferol) 50 MCG ( UT) Oral Capsule; TAKE 1 CAPSULE Daily    Urine Culture from 10/18    Patient:     IVETTE ROSE  MRN:         [id number]  :         1945  FIN:         [id number]  SEX:         Female  Accession:   [id number]  Microbiology  Test Name:                Urine Culture  [P1]      Accession:                [id number]  Source:                   .Urine                   Body Site:  Collected Date/Time:      10/18/2024 12:16 EDT     Received Date/Time:       10/19/2024 03:46 EDT  Start Date/Time:          10/19/2024 03:47 EDT     Free Text Source:         None    ***FINAL REPORT***  Final Report  []  Reported Date/Time: 10/22/2024 08:26 EDT  >100,000 CFU/ml Escherichia coli    ***SUSCEPTIBILITY RESULTS***                    Esccol  Antibiotic        MDIL          MINT  Amoxicillin/      <=8/4         Susceptible  Clavulanate  Ampicillin        <=8 [f1]      Susceptible  Ampicillin/       <=4/2         Susceptible  Sulbactam  Aztreonam         <=4           Susceptible  Cefazolin         <=2 [f2]      Susceptible  Cefepime          <=2           Susceptible  Cefoxitin         <=8           Susceptible  Ceftriaxone       <=1           Susceptible  Cefuroxime        <=4           Susceptible  Ciprofloxacin     <=0.25        Susceptible  Ertapenem         <=0.5         Susceptible  Gentamicin        <=2           Susceptible  Imipenem          <=1           Susceptible  Levofloxacin      <=0.5         Susceptible  Meropenem         <=1           Susceptible  Nitrofurantoin    <=32 [f3]     Susceptible  Piperacillin/     <=8           Susceptible  Tazobactam  Tobramycin        <=2           Susceptible  Trimethoprim/     2/38          Susceptible  Sulfa    Result Comments  f1:   Ampicillin        These ampicillin results predict results for amoxicillin  f2:   Cefazolin        For uncomplicated UTI with K. pneumoniae, E. coli, or P. mirablis: DEBBIE <=16 is sensitive        and DEBBIE >=32 is resistant. This also predicts results for oral agents cefaclor, cefdinir,        cefpodoxime, cefprozil, cefuroxime axetil, cephalexin and locarbef for uncomplicated UTI.        Note that some isolates may be susceptible to these agents while testing resistant to        cefazolin.  f3:   Nitrofurantoin        Should not be used to treat pyelonephritis    P1:   This test was performed at:        [facility], [address] [place], [address], [facility] 79 year old woman, [place] [address] resident, with PMHx of dementia, osteoporosis (on Prolia), anxiety and depression, T1DM, HTN and history of locally advanced HARSHA pancreatic cancer (T2N2) diagnosed  and now status-post Whipple and adjuvant chemotherapy ([place]) with PIEDAD.    HCP: Nirali Coronado ([phone])/ alternate HCP: Melvin Coronado ([phone])  GOC are to maintain function  CODE STATUs: DNR/DNI  treatment preferences: no feeding tube, no hemodialysis.     On Bactrim since 10/23 for UTI  ?  Current OUTPATIENT Meds  amLODIPine Besylate 2.5 MG Oral Tablet; TAKE 1 TABLET BY MOUTH EVERY DAY  BD Pen Needle Karen 2nd Gen 32G X 4 MM  Docusate Sodium 100 MG Oral Capsule; TAKE 1 CAPSULE 3 TIMES DAILY  Escitalopram Oxalate 5 MG Oral Tablet  Losartan Potassium 25 MG Oral Tablet; TAKE 1 TABLET BY MOUTH EVERY DAY  Lyumjev KwikPen 100 UNIT/ML Subcutaneous Solution Pen-injector; INJECT BEFORE 5  UNITS  3 TIMES A DAY BEFORE MEALS  Lyumjev KwikPen 100 UNIT/ML Subcutaneous Solution Pen-injector; INJECT BEFORE  MEALS AS PER SLIDING SCALE: if 0-200=0 units; 201-250=2 units; 251-300= 4 units;  301-350= 6 units; 351-400= 8 units  Premarin 0.625 MG/GM Vaginal Cream; Apply 0.5 g of cream (small amount)  intravaginally daily for 2 weeks, then reduce to twice weekly  Prolia 60 MG/ML Subcutaneous Solution Prefilled Syringe  Rivastigmine 4.6 MG/24HR Transdermal Patch 24 Hour; APPLY 1 PATCH DAILY AS  DIRECTED  Tresiba FlexTouch 100 UNIT/ML Subcutaneous Solution Pen-injector; INJECT 8 UNIT  Daily  Vitamin D (Cholecalciferol) 50 MCG (2000 UT) Oral Capsule; TAKE 1 CAPSULE Daily    Urine Culture from 10/18    Patient:     IVETTE ROES  MRN:         [id number]  :         1945  FIN:         [id number]  SEX:         Female  Accession:   [id number]  Microbiology  Test Name:                Urine Culture  [P1]      Accession:                [id number]  Source:                   .Urine                   Body Site:  Collected Date/Time:      10/18/2024 12:16 EDT     Received Date/Time:       10/19/2024 03:46 EDT  Start Date/Time:          10/19/2024 03:47 EDT     Free Text Source:         None    ***FINAL REPORT***  Final Report  []  Reported Date/Time: 10/22/2024 08:26 EDT  >100,000 CFU/ml Escherichia coli    ***SUSCEPTIBILITY RESULTS***                    Esccol  Antibiotic        MDIL          MINT  Amoxicillin/      <=8/4         Susceptible  Clavulanate  Ampicillin        <=8 [f1]      Susceptible  Ampicillin/       <=4/2         Susceptible  Sulbactam  Aztreonam         <=4           Susceptible  Cefazolin         <=2 [f2]      Susceptible  Cefepime          <=2           Susceptible  Cefoxitin         <=8           Susceptible  Ceftriaxone       <=1           Susceptible  Cefuroxime        <=4           Susceptible  Ciprofloxacin     <=0.25        Susceptible  Ertapenem         <=0.5         Susceptible  Gentamicin        <=2           Susceptible  Imipenem          <=1           Susceptible  Levofloxacin      <=0.5         Susceptible  Meropenem         <=1           Susceptible  Nitrofurantoin    <=32 [f3]     Susceptible  Piperacillin/     <=8           Susceptible  Tazobactam  Tobramycin        <=2           Susceptible  Trimethoprim/     2/38          Susceptible  Sulfa    Result Comments  f1:   Ampicillin        These ampicillin results predict results for amoxicillin  f2:   Cefazolin        For uncomplicated UTI with K. pneumoniae, E. coli, or P. mirablis: DEBBIE <=16 is sensitive        and DEBBIE >=32 is resistant. This also predicts results for oral agents cefaclor, cefdinir,        cefpodoxime, cefprozil, cefuroxime axetil, cephalexin and locarbef for uncomplicated UTI.        Note that some isolates may be susceptible to these agents while testing resistant to        cefazolin.  f3:   Nitrofurantoin        Should not be used to treat pyelonephritis    P1:   This test was performed at:        [facility], [address] [place], [address], [facility]  Alexi Vanegas, Geriatrics & Palliative Care Attending  Contact Info: Call [phone] (HEAL Line) or message on Microsoft Teams    HPI:  79 year old woman, [place] [address] resident, with PMHx of dementia, osteoporosis (on Prolia), anxiety and depression, T1DM, HTN and history of locally advanced HARSHA pancreatic cancer (T2N2) diagnosed  and now status-post Whipple and adjuvant chemotherapy ([place]) with PIEDAD. She is presenting to ED w/ elevated glucose levels, lethargy and disorientation & generalized weakness. The patient is known to this provider from [facility]. She was started on Bactrim for UTI (guided by urine culture) on the ; while mental status remained at baseline, her blood sugars have been challenging to control in the setting of cognitive impairment and frequent eating/snacking at [facility] facility. Palliative medicine consulted for GOC. As per ED team, plan is for hospital admission in the setting of DKA.    The patient was seen at bedside. She was not able to provide any history due to acute encephalopathy.  Her HCP, Dr Nirali Vega, was present during evaluation and provided collateral information.    Of note, 2 days ago the patient underwent CT w/ IV contrast for pancreatic cancer surveillance.     Patient seen and examined at bedside. Appears overtly comfortable. Denies any significant complaint. Comprehensive symptom assessment and GOC exploration as noted below. Further collateral obtained from primary team, chart, and family.    PERTINENT PM/SXH:   HTN (hypertension)  DM1 (diabetes mellitus)  Pancreatic cancer  Dementia    H/O Whipple procedure    FAMILY HISTORY:  Unable to obtain due to encephalopathy    ITEMS NOT CHECKED ARE NOT PRESENT  SOCIAL HISTORY:   Significant other/partner:  []  Children:  []  Substance hx:  []   Tobacco hx:  []   Alcohol hx: [] - none   Home Opioid hx:  [] I-Stop Reference No:   - no active Rx's  Living Situation: []Home  []Long term care  []Rehab [x][address]  Caodaism/Spiritual practice: ; Role of organized [employer] [] important [] some [x] unable to assess  Coping: [] well [] with difficulty [] poor coping [x] unable to assess  Support system: [] strong [x] adequate [] inadequate    ADVANCE DIRECTIVES:    CODE STATUs: DNR/DNI  treatment preferences: no feeding tube, no hemodialysis.   []MOLST  [x]Living Will -   DECISION MAKER(s):  [x] Health Care Proxy(s)  [] Surrogate(s)  [] Guardian           Name(s)/Phone Number(s):   HCP: Nirali Coronado ([phone])/ alternate HCP: Melvin Coronado ([phone])    BASELINE (I)ADLs (prior to admission):  [place]: []Total  [x] Moderate []Dependent  Uses walker at baseline; able to feed self; AAOx2 at baseline.  Needs assistance with all other ADLs and IADLs.  Has 12h private aide overnight.    ALLERGIES:  No Known Allergies    Current OUTPATIENT Meds  amLODIPine Besylate 2.5 MG Oral Tablet; TAKE 1 TABLET BY MOUTH EVERY DAY  BD Pen Needle Karen 2nd Gen 32G X 4 MM  Docusate Sodium 100 MG Oral Capsule; TAKE 1 CAPSULE 3 TIMES DAILY  Escitalopram Oxalate 5 MG Oral Tablet  Losartan Potassium 25 MG Oral Tablet; TAKE 1 TABLET BY MOUTH EVERY DAY  Lyumjev KwikPen 100 UNIT/ML Subcutaneous Solution Pen-injector; INJECT BEFORE 5  UNITS  3 TIMES A DAY BEFORE MEALS  Lyumjev KwikPen 100 UNIT/ML Subcutaneous Solution Pen-injector; INJECT BEFORE  MEALS AS PER SLIDING SCALE: if 0-200=0 units; 201-250=2 units; 251-300= 4 units;  301-350= 6 units; 351-400= 8 units  Premarin 0.625 MG/GM Vaginal Cream; Apply 0.5 g of cream (small amount)  intravaginally daily for 2 weeks, then reduce to twice weekly  Prolia 60 MG/ML Subcutaneous Solution Prefilled Syringe  Rivastigmine 4.6 MG/24HR Transdermal Patch 24 Hour; APPLY 1 PATCH DAILY AS  DIRECTED  Tresiba FlexTouch 100 UNIT/ML Subcutaneous Solution Pen-injector; INJECT 8 UNIT  Daily  Vitamin D (Cholecalciferol) 50 MCG (2000 UT) Oral Capsule; TAKE 1 CAPSULE Daily    MEDICATIONS  (STANDING):  chlorhexidine 2% Cloths 1 Application(s) Topical <User Schedule>  dextrose 50% Injectable 25 Gram(s) IV Push once  heparin   Injectable 5000 Unit(s) SubCutaneous every 12 hours  insulin regular Infusion 4.76 Unit(s)/Hr (4.76 mL/Hr) IV Continuous <Continuous>  lactated ringers. 1000 milliLiter(s) (200 mL/Hr) IV Continuous <Continuous>    MEDICATIONS  (PRN):    Analgesic Use (Scheduled and PRNs) for past 24 hours:    PRESENT SYMPTOMS: [x]Unable to obtain due to poor mentation/encephalopathy  Source if other than patient:  [x]Family   []Team     Pain: [] yes [x] no  QOL impact -   Location -                    Aggravating Factors -  Quality -  Radiation -  Timing -  Severity (0-10 scale) -   Minimal Acceptable Level (0-10 scale) -    Other Symptoms: See ESAS Section Below  [x]All other review of systems negative     GENERAL:  [x] NAD []Alert [x]Lethargic  []Cachexia  []Unarousable  []Verbal  [x]Non-Verbal  BEHAVIORAL:   []Anxiety  []Delirium []Agitation [x]Cooperative []Oriented x  HEENT:  []Normal  [] Moist Mucous Membranes x[]Dry mouth   []ET Tube/Trach  []Oral lesions  PULMONARY:   []Clear [x]Tachypnea  []Audible excessive secretions  []Normal Work of Breathing []Labored Breathing  []Rhonchi []Crackles []Wheezing  CARDIOVASCULAR:    [x]Regular Rate []Regular Rhythm []Irregular [x]Tachy  []Sherif  GASTROINTESTINAL:  [x]Soft  []Distended   []+BS  [x]Non tender []Tender  []PEG []OGT/ NGT  Last BM:  GENITOURINARY:  []Normal [x] Incontinent   []Oliguria/Anuria   [][place]  MUSCULOSKELETAL:   []Normal Extremities  [x]Weakness  []Bed/Wheelchair bound []Edema  NEUROLOGIC:   []No focal deficits  []Cognitive impairment  []Dysphagia []Dysarthria []Paresis [x]Encephalopathic  SKIN:   [x]Normal   []Pressure ulcer(s)  []Rash    CRITICAL CARE:  [ ]Shock Present  [ ]Septic [ ]Cardiogenic [ ]Neurologic [ ]Hypovolemic  [ ] Vasopressors [ ]Inotropes   [ ]Respiratory failure present [ ]Mechanical Ventilation [ ]Non-invasive ventilatory support [ ]High-Flow  [ ]Acute  [ ]Chronic [ ]Hypoxic  [ ]Hypercarbic   [ ]Other organ failure    Vital Signs Last 24 Hrs  T(C): 36.4 (30 Oct 2024 14:48), Max: 36.4 (30 Oct 2024 14:48)  T(F): 97.6 (30 Oct 2024 14:48), Max: 97.6 (30 Oct 2024 14:48)  HR: 85 (30 Oct 2024 16:05) (80 - 113)  BP: 135/64 (30 Oct 2024 16:05) (110/51 - 139/65)  BP(mean): 92 (30 Oct 2024 16:05) (74 - 94)  RR: 20 (30 Oct 2024 16:05) (17 - 28)  SpO2: 96% (30 Oct 2024 16:05) (94% - 100%)    Parameters below as of 30 Oct 2024 16:05  Patient On (Oxygen Delivery Method): room air    LABS: Personally reviewed and interpreted                        9.9    8.90  )-----------( 260      ( 30 Oct 2024 12:30 )             32.2   10-30    137  |  103  |  43[H]  ----------------------------<  285[H]  4.0   |  25  |  2.21[H]    Ca    9.8      30 Oct 2024 16:07  Phos  3.0     10-30  Mg     2.1     10-30    TPro  7.7  /  Alb  4.6  /  TBili  0.2  /  DBili  x   /  AST  162[H]  /  ALT  68[H]  /  AlkPhos  147[H]  10-30    Urine Culture from 10/18  Patient:     IVETTE ROSE  MRN:         [id number]  :         1945  FIN:         [id number]  SEX:         Female  Accession:   [id number]  Microbiology  Test Name:                Urine Culture  [P1]      Accession:                [id number]  Source:                   .Urine                   Body Site:  Collected Date/Time:      10/18/2024 12:16 EDT     Received Date/Time:       10/19/2024 03:46 EDT  Start Date/Time:          10/19/2024 03:47 EDT     Free Text Source:         None    ***FINAL REPORT***  Final Report  []  Reported Date/Time: 10/22/2024 08:26 EDT  >100,000 CFU/ml Escherichia coli    ***SUSCEPTIBILITY RESULTS***                    Esccol  Antibiotic        MDIL          MINT  Amoxicillin/      <=8/4         Susceptible  Clavulanate  Ampicillin        <=8 [f1]      Susceptible  Ampicillin/       <=4/2         Susceptible  Sulbactam  Aztreonam         <=4           Susceptible  Cefazolin         <=2 [f2]      Susceptible  Cefepime          <=2           Susceptible  Cefoxitin         <=8           Susceptible  Ceftriaxone       <=1           Susceptible  Cefuroxime        <=4           Susceptible  Ciprofloxacin     <=0.25        Susceptible  Ertapenem         <=0.5         Susceptible  Gentamicin        <=2           Susceptible  Imipenem          <=1           Susceptible  Levofloxacin      <=0.5         Susceptible  Meropenem         <=1           Susceptible  Nitrofurantoin    <=32 [f3]     Susceptible  Piperacillin/     <=8           Susceptible  Tazobactam  Tobramycin        <=2           Susceptible  Trimethoprim/     2/38          Susceptible  Sulfa    Result Comments  f1:   Ampicillin        These ampicillin results predict results for amoxicillin  f2:   Cefazolin        For uncomplicated UTI with K. pneumoniae, E. coli, or P. mirablis: DEBBIE <=16 is sensitive        and DEBBIE >=32 is resistant. This also predicts results for oral agents cefaclor, cefdinir,        cefpodoxime, cefprozil, cefuroxime axetil, cephalexin and locarbef for uncomplicated UTI.        Note that some isolates may be susceptible to these agents while testing resistant to        cefazolin.  f3:   Nitrofurantoin        Should not be used to treat pyelonephritis    P1:   This test was performed at:        [facility], [address] [place] [address], [id number], [facility]    RADIOLOGY & ADDITIONAL STUDIES: Personally reviewed and interpreted  < from: CT Abdomen and Pelvis w/ Oral Cont and w/ IV Cont (10.28.24 @ 17:21) >  PROCEDURE:  CT of the Chest, Abdomen and Pelvis was performed.  Dual phase, arterial and portal venous phase imaging was performed   through the upper abdomen.  Sagittal and coronal reformats were performed.    FINDINGS:  CHEST:  LUNGS AND LARGE AIRWAYS: Patent central airways. Nodular/tubular   branching density is again seen in the right lobe likely due to areas of   bronchial impaction. Small calcified granuloma lingula segment of left   upper lobe. No suspicious pulmonary nodules.  PLEURA: No pleuraleffusion.  VESSELS: No thoracic aortic aneurysm. No pulmonary emboli.  HEART: Heart size is normal. No pericardial effusion.  MEDIASTINUM AND JESS: No lymphadenopathy.  CHEST WALL AND LOWER NECK: Within normal limits.    ABDOMEN AND PELVIS:  LIVER: Stable 1.1 cm hypodense lesion posterior segment right hepatic   lobe, likely hemangioma.  BILE DUCTS: Pneumobilia. No biliary dilatation.  GALLBLADDER: Cholecystectomy.  SPLEEN: A few small calcified granulomas.  PANCREAS: Status post partial pancreatectomy and Whipple procedure. Small   amount of gas within the pancreatic duct which is not abnormally   distended.  ADRENALS: Within normal limits.  KIDNEYS/URETERS: Within normal limits.    BLADDER: Within normal limits.  REPRODUCTIVE ORGANS: Hysterectomy.    BOWEL: Postoperative changes consistent with Whipple procedure. No bowel   obstruction. Appendix is normal.  PERITONEUM/RETROPERITONEUM: Within normal limits.  VESSELS: Abdominal aortic aneurysm. Patent portal vein.  LYMPH NODES: No lymphadenopathy.  ABDOMINAL WALL: Within normal limits.  BONES: Chronic compression of L2 vertebral body. ORIF of right clavicle.   Old rib fractures. Old left inferior pubic ramus fracture. Degenerative   changes.    IMPRESSION:  Status post Whipple procedure. No evidence of local recurrence or distant   metastasis.    --- End of Report ---    < end of copied text >    REFERRALS:  [x]Social Work/Case management []PT/OT []Chaplaincy  []Hospice  []Patient/Family Support []Massage Therapy []Music Therapy []Holistic RN []Ethics    DISCUSSION OF CASE: Family - to provide updates and emotional support; Primary Team/RN - to discuss plan of care

## 2024-10-30 NOTE — CONSULT NOTE ADULT - CONVERSATION DETAILS
Met with the patient's HCP at bedside in the ER.  The patient did not participate of the meeting due to encephalopathy.    Ms Coronado confirmed she is the HCP and informed me the patient has a living will at the nursing home. She has good understanding of the patient's current medical condition. She reports the nursing home has been having trouble managing the patient's blood sugar.  GOC are to maintain function and receive treatments to return to previous baseline.  Ms Coronado reported the patient is DNR/DNI, no feeding tube or hemodialysis.    MOLST form filled and signed.    Start time 11am  End time 1120am  Total time 20 mins

## 2024-10-30 NOTE — ED ADULT NURSE NOTE - NS ED NURSE TRANSPORT WITH
Yogesh Barbosa)  Medicine  [address] [place] [address]  Phone: [phone]  Fax: [phone]  Follow Up Time: 1 month    Asher Cisneros)  Cardiovascular Disease  [address] [place] [address], [address]  [place] [address]  Phone: [phone]  Fax: [phone]  Follow Up Time: 1 month    Nelson Escobar)  Surgery; Thoracic and Cardiac Surgery  [address] [place] [address], [address]  [place], [place] [id number]  Phone: [phone]  Fax: [phone]  Follow Up Time: 1 week Yogesh Barbosa)  Medicine  [address] [place] [address]  Phone: [phone]  Fax: [phone]  Follow Up Time: 1 month    Asher Cisneros)  Cardiovascular Disease  [address] [facility], [address]  [place] [address]  Phone: [phone]  Fax: [phone]  Follow Up Time: 1 month    Nelson Escobar)  Surgery; Thoracic and Cardiac Surgery  [address] [facility], [address]  [place], [place] [address]  Phone: [phone]  Fax: [phone]  Follow Up Time: 1 week    Edwin Warren (MD)  EndocrinologyMetabDiabetes; Internal Medicine  [facility] [place] [address]  Phone: [phone]  Fax: [phone]  Follow Up Time:     Nadeen Rodriguez)  Female Pelvic MedReconst Surg; Obstetrics and Gynecology  [address] [place] [address]  Phone: [phone]  Fax: [phone]  Follow Up Time:     Santy Simmons; MD)  Cardiovascular Disease; Internal Medicine  [address] [place] [address]  Phone: [phone]  Fax: [phone]  Follow Up Time: Cardiac Monitor/Defib/ACLS/Rescue Kit/O2/BVM/IV pump/pulse ox/ACLS Rescue Kit

## 2024-10-30 NOTE — ED ADULT NURSE NOTE - NSFALLHARMRISKINTERV_ED_ALL_ED

## 2024-10-30 NOTE — ED PROVIDER NOTE - OBJECTIVE STATEMENT
79-year-old female history of hypertension, dementia, diabetes, pancreatic cancer status post Whipple brought in by ambulance for altered mentation and high sugar.  Patient's healthcare proxy/friend providing most of the history.  She states the patient was started on Bactrim at her assisted living facility approximately 1 week ago for  UTI.  Patient has been having high sugars for the past 1 to 2 weeks (glucose greater than 500).  Patient was noted by the aide that was with the patient overnight to be disoriented this morning and generally weak which the friend states this happened before when she has a UTI and/or high sugar.  Patient without URI symptoms, cough, difficulty breathing.  Patient denies abdominal pain, nausea, vomiting, diarrhea.  Friend reports patient has chronic itching on her vaginal area that is not responding to estrogen creams for "a long time."  No  chest pain, difficulty breathing, headache.  No known fever.

## 2024-10-30 NOTE — CONSULT NOTE ADULT - PROBLEM SELECTOR RECOMMENDATION 2
.  -at baseline, the patient uses walker for ambulation. Has had multiple falls in the past due to poor balance in the setting of cognitive impairment.  -she is at risk of falls and deconditioning in the hospital.     -falls precautions in place  -OOB to chair with assistance as soon as medically safe to prevent deconditioning  -please consult PT & OT once clinically stable

## 2024-10-30 NOTE — CONSULT NOTE ADULT - PROBLEM SELECTOR RECOMMENDATION 4
.  -patient w/ 50+y hx of DM1, on insulin therapy.  -home insulin regimen -- TRESIBA 8u daily + lispro 5u TID + sliding scale TID  -blood sugars have been harder to control due to cognitive impairment and trouble controlling food intake  -DKA management as per ICU team  -would consider having endocrinology input once DKA is resolved to help find a better regimen for outpatient.

## 2024-10-30 NOTE — ED PROVIDER NOTE - MDM ORDERS SUBMITTED SELECTION
Jada Dowling is a 62year old female presenting for   Chief Complaint   Patient presents with   â¢ Follow-up     1 month follow up; Varicose veins, s/p sclerotherapy     Denies Latex allergy or sensitivity.     Medication verified and med list updated  Refills needed today: No Medications

## 2024-10-30 NOTE — CONSULT NOTE ADULT - PROBLEM SELECTOR RECOMMENDATION 5
moderate .  -please see GOC note above.  -I obtained a copy of the patient's living will and placed it in the paper chart.  -confirmed HCP -> Nirali Coronado ([phone])/ alternate HCP: Melvin Coronado ([phone])  -GOC are to maintain function and return to previous baseline.  -Code status is DNR/DNI.  -treatment preferences: no feeding tube, no hemodialysis.   -MOLST form filled and placed in paper chart

## 2024-10-30 NOTE — ED PROVIDER NOTE - PROGRESS NOTE DETAILS
ICU consulted, await return call. Labs w low pH, high beta hydroxybutyrate, glu > 1000, pseudohyponatremia (corrects to 136), k 6.3, high ag c/w dka.  No ekg changes w high K; ordered for ca but expect K to correct w insulin gtt.  Will cont to monitor.  Pt accepted to ICU.

## 2024-10-30 NOTE — CONSULT NOTE ADULT - SUBJECTIVE AND OBJECTIVE BOX
80yo F resident in [facility], w/ PMHx of dementia, osteoporosis (on Prolia), anxiety and depression, T1DM, HTN and history of locally advanced HARSHA pancreatic cancer (T2N2) diagnosed 2020 and now status-post Whipple and adjuvant chemotherapy - presenting to ED w/ elevated glucose levels, lethargy/disorientaion & generalized weakness. Of note, 2 wks ago pt started c/o genital pruritus, however unable to endorse if dysuria/frequency/urgency. UA performed at [facility] showed UTI,  tx'd with Bactrim outpatient however pt's confusion did not reside. History provided mostly by pt's cousin at bedside who states that pt has been having trouble w/ glucose control while at [facility], with few weeks-months elevated glucose readings (>300 per cousin) despite taking prescribed doses of basal/bolus insulin.     ED COURSE:   Vitals: T 97.4, , /69, RR 28 sat 94% RA  Labs: WBC 9.85, Hb 9.5, Na 121, K 6.3, Cl 85, Bicarb 4 w/ AG 32, BUN/Cr 53/2.58 (unclear baseline). Glucose 1008, BHB >8. Phos 8.7.   VBG w/ pH 7.00   Interventions: 2g Ca Gluc, started on insulin gtt in ED, 2L NS  Consults: ICU     ICU Consulted for labs c/f DKA, requiring insulin gtt.     OVERNIGHT EVENTS:    SUBJECTIVE / INTERVAL HPI: Patient seen and examined at bedside. No complaints at this time. Patient denies: fever, chills, dizziness, weakness, HA, Changes in vision, CP, palpitations, SOB, cough, N/V/D/C, dysuria, changes in bowel movements, LE edema. ROS otherwise negative.    VITAL SIGNS:  Vital Signs Last 24 Hrs  T(C): 36.1 (30 Oct 2024 10:25), Max: 36.3 (30 Oct 2024 09:33)  T(F): 97 (30 Oct 2024 10:25), Max: 97.4 (30 Oct 2024 09:33)  HR: 97 (30 Oct 2024 12:19) (97 - 113)  BP: 112/53 (30 Oct 2024 12:19) (110/55 - 122/69)  BP(mean): 77 (30 Oct 2024 12:19) (74 - 77)  RR: 20 (30 Oct 2024 12:19) (18 - 28)  SpO2: 99% (30 Oct 2024 12:19) (94% - 100%)    Parameters below as of 30 Oct 2024 12:19  Patient On (Oxygen Delivery Method): room air        PHYSICAL EXAM:    General: tachypneic but appears comfrt  HEENT: NCAT  Neck: supple, trachea midline  Cardiovascular: S1, S2 normal; RRR, no M/G/R  Respiratory: CTABL; no W/R/R  Gastrointestinal: soft, nontender, nondistended. bowel sounds present.  Skin: no ulcerations or visible rashes appreciated  Extremities: WWP; no edema, clubbing or cyanosis  Vascular: 2+ radial, DP/PT pulses B/L  Neurological: AAOx3; CN II-XII grossly intact; no focal deficits    MEDICATIONS:  MEDICATIONS  (STANDING):  chlorhexidine 2% Cloths 1 Application(s) Topical <User Schedule>  dextrose 50% Injectable 25 Gram(s) IV Push once  heparin   Injectable 5000 Unit(s) SubCutaneous every 12 hours  insulin regular  human recombinant. 5 Unit(s) SubCutaneous once  insulin regular Infusion 4.76 Unit(s)/Hr (4.76 mL/Hr) IV Continuous <Continuous>  lactated ringers. 1000 milliLiter(s) (200 mL/Hr) IV Continuous <Continuous>  sodium bicarbonate  Injectable 50 milliEquivalent(s) IV Push every 5 minutes    MEDICATIONS  (PRN):      ALLERGIES:  Allergies    No Known Allergies    Intolerances        LABS:                        9.5    9.85  )-----------( 308      ( 30 Oct 2024 09:45 )             33.2     10-30    121[L]  |  85[L]  |  53[H]  ----------------------------<  1008[HH]  6.3[HH]   |  4[LL]  |  2.58[H]    Ca    9.2      30 Oct 2024 09:45  Phos  8.7     10-30  Mg     2.5     10-30        Urinalysis Basic - ( 30 Oct 2024 09:45 )    Color: x / Appearance: x / SG: x / pH: x  Gluc: 1008 mg/dL / Ketone: x  / Bili: x / Urobili: x   Blood: x / Protein: x / Nitrite: x   Leuk Esterase: x / RBC: x / WBC x   Sq Epi: x / Non Sq Epi: x / Bacteria: x      CAPILLARY BLOOD GLUCOSE      POCT Blood Glucose.: >600 mg/dL (30 Oct 2024 11:59)      RADIOLOGY & ADDITIONAL TESTS: Reviewed.

## 2024-10-30 NOTE — CONSULT NOTE ADULT - PROBLEM SELECTOR RECOMMENDATION 6
.  Complex medical decision making / symptom management in the setting of dementia and uncontrolled DM.    Will continue to follow for ongoing GOC discussion / titration of palliative regimen. Emotional support provided, questions answered.  Active Psychosocial Referrals:  [x]Social Work/Case management []PT/OT []Chaplaincy []Hospice  []Patient/Family Support []Holistic RN []Massage Therapy []Music Therapy []Ethics  Coping: [] well [] with difficulty [] poor coping [] unable to assess  Support system: [] strong [] adequate [] inadequate    For new or uncontrolled symptoms, please call Palliative Care at [phone]). The service is available 24/7 (including nights & weekends) to provide symptom management recommendations over the phone as appropriate

## 2024-10-30 NOTE — ED ADULT NURSE NOTE - NSICDXPASTMEDICALHX_GEN_ALL_CORE_FT
PAST MEDICAL HISTORY:  Dementia     DM (diabetes mellitus)     HTN (hypertension)     Pancreatic cancer

## 2024-10-30 NOTE — H&P ADULT - ASSESSMENT
78yo F resident in [facility], w/ PMHx of dementia, osteoporosis (on Prolia), anxiety and depression, T1DM, HTN and history of locally advanced HARSHA pancreatic cancer (T2N2) diagnosed 2020 and now status-post Whipple and adjuvant chemotherapy - presenting to ED w/ elevated glucose levels, lethargy/disorientaion & generalized weakness - found with DKA. ICU Consulted for DKA requiring insulin gtt.     CARDIOVASCULAR  #HTN  Hx HTN, home meds amlodipine 2.5mg qd, Losartan 25mg qd   - normotensive now, restart amlodipine when tolerating PO   - hold Losartan iso PERCY, restart as tolerated    PULMONARY   LACHELLE    GASTROINTESTINAL   LACHELLE     RENAL  #PERCY likely on CKD   Unknown baseline. P/w BUN/Cr 53/2.58. Likely element of pre-renal as pt very dry on exam, iso DKA. Suspect component of CKD   - c/w IV Fluids as above   - F/u urine studies  - Obtain collateral  - Consider RP US     ENDOCRINE    #DKA  P/w lethargy, confusion, nausea (no vomiting) gluc >1000, Bicarb 4 w/ AG 32, BHB >8. K 6.3 and Phos 8.7, indicating DKA. No hx of prior DKA admissions per cousin.   S/p 2g CaGluc, 2L NS, & started on insulin gtt in ED.   - Admit to MICU for DKA protocol  - Insulin 5u bolus (0.1/kg) x1 now, c/ insulin gtt per protocol for now  - Gave 3amp bicarb for pH 7.0 on VBG  - Repeat BMP, Mag. Phos, VBG, lactate, BHB q4h (next ordered 1PM)   - Started /hr for now  - When K<5, add KCl additive to IV fluids     #DM1  Hx Type 1 DM, on basal bolus insulin however doses unclear  Per [facility], patient on tresiba (insulin degludec) 10 units subQ every morning  Also on insulin lispro sliding scale and 7u subQ before meals    INFECTIOUS DISEASE  #Recent UTI  Pt reportedly w/ recent UTI (UA performed at [facility]) tx'd w/ Bactirm. Unclear if pt had sx, as unable to endorse (AOx0-1 at baseline per cousin) however was c/o genital pruritus. Pt had urinary frequency however iso polydipsia likely 2/2 DKA. SIRS Criteria: Tachypnea & tachycardic, however vital signs may be iso DKA  - f/u UA with reflex culture  - If positive, can start CTX 1g q24h for coverage    PSYCH  #Anxiety/Depression  - Restart home escitalopram 5mg qd once tolerating PO    Prophylactic Measures:  Fluids: S/p 2L NS, on /hr  Electrolytes: Replete aggressively   Nutrition: NPO for now  PPx: Hep subcutaneous q12h    Dispo: MICU

## 2024-10-30 NOTE — H&P ADULT - NSHPPOAPULMEMBOLUS_GEN_A_CORE
Burning sensation across both sides of his torso x 2 weeks; no visible rash today. Area is sensitive to touch. Given his history of chicken pox twice in childhood and skin sensitivity in dermatone pattern, high suspicion for shingles flare. He has not been vaccinated against shingles.   - We will start treatment with valtrex 1G TID for 7days, adding gabapentin 100mg TID prn pain.   - He understands to wait till symptoms have completely cleared before getting vaccine.      no

## 2024-10-30 NOTE — CONSULT NOTE ADULT - PROBLEM SELECTOR RECOMMENDATION 9
.  -secondary to DKA  -at baseline, the patient is AAOx2; moderate dementia, FAST scale 6c-d. PPS 50%  -she is at high risk for delirium during this admission. Please continue to treat TME.    - non pharm delirium precautions:  -- minimize "tethering" as much as possible -- remove unnecessary IV lines and monitor leads, review need for NGT, lincoln and physical restraints.  -- please move patient closer to window if possible  -- allow natural light to enter room during day  -- ensure proper hydration and food intake  -- have family members spend time with patient  -- keep clock visible in patient's room at all time  -- frequent reassurance

## 2024-10-30 NOTE — H&P ADULT - NSHPPHYSICALEXAM_GEN_ALL_CORE
General: tachypneic but appears comfortable  HEENT: NCAT  Neck: supple, trachea midline  Cardiovascular: S1, S2 normal; RRR, no M/G/R  Respiratory: CTAB; no W/R/R  Gastrointestinal: soft, nontender, nondistended. bowel sounds present.  Skin: no ulcerations or visible rashes appreciated  Extremities: WWP; no edema, clubbing or cyanosis  Vascular: 2+ radial, DP/PT pulses B/L  Neurological: AAOx3; CN II-XII grossly intact; no focal deficits General: agitated, moving around in bed agitated  HEENT: PERRL  Neck: supple, trachea midline  Cardiovascular: S1, S2 normal; RRR, no M/G/R  Respiratory: CTAB; no W/R/R  Gastrointestinal: soft, nontender, nondistended  : pt gotten two SCs for retention of urine  Skin: no ulcerations or visible rashes appreciated  Extremities: WWP; no edema, clubbing or cyanosis  Vascular: 2+ radial, DP/PT pulses B/L  Neurological: AAOx3; CN II-XII grossly intact; no focal deficits

## 2024-10-30 NOTE — ED ADULT TRIAGE NOTE - CHIEF COMPLAINT QUOTE
Pt. hx of pancreatic ca, type 1 diabetic, dementia with baseline of lucidity on a typical day still, bibems from [facility], for hyperglycemia and AMS since this morning. Pt. woke up feeling hot, dry, restless. and tired, and BGL was repeatedly reading "high", was given 15 units of subq Fast acting insulin (Lyumjev), at 845am PTA. In triage, BGL still reading as "High". Pt. made UG to MD Director. HCP at bedside, states she has been recently dealing with a UTI and unsure of the assisted living's compliance with abx.

## 2024-10-30 NOTE — CONSULT NOTE ADULT - PROBLEM SELECTOR RECOMMENDATION 3
.  -patient w/ PERCY; baseline cr 0.85.  -of note, she underwent CT with IV contrast on 10/28  -supportive care and management per ICU team

## 2024-10-30 NOTE — H&P ADULT - HISTORY OF PRESENT ILLNESS
78yo F resident in [facility], w/ PMHx of dementia, osteoporosis (on Prolia), anxiety and depression, T1DM, HTN and history of locally advanced HARSHA pancreatic cancer (T2N2) diagnosed 2020 and now status-post Whipple and adjuvant chemotherapy - presenting to ED w/ elevated glucose levels, lethargy/disorientaion & generalized weakness. Of note, 2 wks ago pt started c/o genital pruritus, however unable to endorse if dysuria/frequency/urgency. UA performed at [facility] showed UTI,  tx'd with Bactrim outpatient however pt's confusion did not reside. History provided mostly by pt's cousin at bedside who states that pt has been having trouble w/ glucose control while at [facility], with few weeks-months elevated glucose readings (>300 per cousin) despite taking prescribed doses of basal/bolus insulin.   Patient seen and examined at bedside. No complaints at this time. Patient denies: fever, chills, dizziness, weakness, HA, Changes in vision, CP, palpitations, SOB, cough, N/V/D/C, dysuria, changes in bowel movements, LE edema. ROS otherwise negative.  ED COURSE:   Vitals: T 97.4, , /69, RR 28 sat 94% RA  Labs: WBC 9.85, Hb 9.5, Na 121, K 6.3, Cl 85, Bicarb 4 w/ AG 32, BUN/Cr 53/2.58 (unclear baseline). Glucose 1008, BHB >8. Phos 8.7.   VBG w/ pH 7.00   Interventions: 2g Ca Gluc, started on insulin gtt in ED, 2L NS  Consults: ICU  80yo F resident in [facility], w/ PMHx of dementia, osteoporosis (on Prolia), anxiety and depression, T1DM, HTN and history of locally advanced HARSHA pancreatic cancer (T2N2) diagnosed 2020 and now status-post Whipple and adjuvant chemotherapy - presenting to ED w/ elevated glucose levels, lethargy/disorientaion & generalized weakness. Of note, 2 wks ago pt started c/o genital pruritus, however unable to endorse if dysuria/frequency/urgency. UA performed at [facility] showed UTI,  tx'd with Bactrim outpatient however pt's confusion did not reside. History provided mostly by pt's cousin at bedside who states that pt has been having trouble w/ glucose control while at [facility], with few weeks-months elevated glucose readings (>300 per cousin) despite taking prescribed doses of basal/bolus insulin.   Patient seen and examined at bedside. Agitated and yelling to get out of bed because she needs to urinate. She denies any fever or chills but continuously keeps stating that she has to urinate. She denies any headaches or chest pain. Any additional ROS unable to be obtained given patient non-compliance to interview.  ED COURSE:   Vitals: T 97.4, , /69, RR 28 sat 94% RA  Labs: WBC 9.85, Hb 9.5, Na 121, K 6.3, Cl 85, Bicarb 4 w/ AG 32, BUN/Cr 53/2.58 (unclear baseline). Glucose 1008, BHB >8. Phos 8.7.   VBG w/ pH 7.00   Interventions: 2g Ca Gluc, started on insulin gtt in ED, 2L NS  Consults: ICU

## 2024-10-30 NOTE — CONSULT NOTE ADULT - ASSESSMENT
80yo F resident in [facility], w/ PMHx of dementia, osteoporosis (on Prolia), anxiety and depression, T1DM, HTN and history of locally advanced HARSHA pancreatic cancer (T2N2) diagnosed 2020 and now status-post Whipple and adjuvant chemotherapy - presenting to ED w/ elevated glucose levels, lethargy/disorientaion & generalized weakness - found with DKA. ICU Consulted for DKA requiring insulin gtt.     CARDIOVASCULAR  #HTN  Hx HTN, home meds amlodipine 2.5mg qd, Losartan 25mg qd   - normotensive now, restart amlodipine when tolerating PO   - hold Losartan iso PERCY, restart as tolerated    PULMONARY   LACHELLE    GASTROINTESTINAL   LACHELLE     RENAL  #PERCY likely on CKD   Unknown baseline. P/w BUN/Cr 53/2.58. Likely element of pre-renal as pt very dry on exam, iso DKA. Suspect component of CKD   - c/w IV Fluids as above   - F/u urine studies  - Obtain collateral  - Consider RP US     ENDOCRINE    #DKA  P/w lethargy, confusion, nausea (no vomiting) gluc >1000, Bicarb 4 w/ AG 32, BHB >8. K 6.3 and Phos 8.7, indicating DKA. No hx of prior DKA admissions per cousin.   S/p 2g CaGluc, 2L NS, & started on insulin gtt in ED.   - Admit to MICU for DKA protocol  - Insulin 5u bolus (0.1/kg) x1 now, c/ insulin gtt per protocol for now  - Gave 3amp bicarb for pH 7.0 on VBG  -  Repeat BMP, Mag. Phos, VBG, lactate, BHB q4h (next ordered 1PM)   - Started /hr for now  - When K<5, add KCl additive to IV fluids     #DM1  Hx Type 1 DM, on basal bolus insulin however doses unclear  - Will obtain collateral from [facility] for home dosing      INFECTIOUS DISEASE  #Recent UTI  Pt reportedly w/ recent UTI (UA performed at [facility]) tx'd w/ Bactirm. Unclear if pt had sx, as unable to endorse (AOx0-1 at baseline per cousin) however was c/o genital pruritus. Pt had urinary frequency however iso polydipsia likely 2/2 DKA. SIRS Criteria: Tachypnea & tachycardic, however vital signs may be iso DKA  - f/u UA  - If positive, can start CTX 1g q24h for coverage    PSYCH  #Anxiety/Depression  - Restart home escitalopram 5mg qd once tolerating PO    PREVENTIVE MEASURE:   Fluids: S/p 2L NS, on /hr  Electrolytes: Replete aggressively   Nutrition: NPO for now  PPx: Hep subcu    Dispo: [facility]  Discussed w/ Dr. Dawn
79 year old woman, [place] [id number] resident, with PMHx of dementia, osteoporosis (on Prolia), anxiety and depression, T1DM, HTN and history of locally advanced HARSHA pancreatic cancer (T2N2) diagnosed 2020 and now status-post Whipple and adjuvant chemotherapy ([place]) with PIEDAD. She is presenting to ED w/ elevated glucose levels, lethargy and disorientation & generalized weakness. The patient is known to this provider from [facility]. She was started on Bactrim for UTI (guided by urine culture) on the 23rd; while mental status remained at baseline, her blood sugars have been challenging to control in the setting of cognitive impairment and frequent eating/snacking at [facility] facility. Palliative medicine consulted for GOC. As per ED team, plan is for hospital admission in the setting of DKA.    GOC are to maintain function and return to previous baseline.  Code status is DNR/DNI.  Prognosis is reserved.    Admission to ICU and DKA treatment in agreement with patient's GOC.  Please do med rec - copy of [facility] meds provided to ICU team.

## 2024-10-31 DIAGNOSIS — Z29.9 ENCOUNTER FOR PROPHYLACTIC MEASURES, UNSPECIFIED: ICD-10-CM

## 2024-10-31 DIAGNOSIS — E10.9 TYPE 1 DIABETES MELLITUS WITHOUT COMPLICATIONS: ICD-10-CM

## 2024-10-31 DIAGNOSIS — F03.90 UNSPECIFIED DEMENTIA, UNSPECIFIED SEVERITY, WITHOUT BEHAVIORAL DISTURBANCE, PSYCHOTIC DISTURBANCE, MOOD DISTURBANCE, AND ANXIETY: ICD-10-CM

## 2024-10-31 DIAGNOSIS — E11.10 TYPE 2 DIABETES MELLITUS WITH KETOACIDOSIS WITHOUT COMA: ICD-10-CM

## 2024-10-31 DIAGNOSIS — I10 ESSENTIAL (PRIMARY) HYPERTENSION: ICD-10-CM

## 2024-10-31 DIAGNOSIS — N17.9 ACUTE KIDNEY FAILURE, UNSPECIFIED: ICD-10-CM

## 2024-10-31 LAB
ALBUMIN SERPL ELPH-MCNC: 3 G/DL — LOW (ref 3.3–5)
ALP SERPL-CCNC: 95 U/L — SIGNIFICANT CHANGE UP (ref 40–120)
ALT FLD-CCNC: 47 U/L — HIGH (ref 10–45)
ANION GAP SERPL CALC-SCNC: 7 MMOL/L — SIGNIFICANT CHANGE UP (ref 5–17)
ANION GAP SERPL CALC-SCNC: 8 MMOL/L — SIGNIFICANT CHANGE UP (ref 5–17)
AST SERPL-CCNC: 109 U/L — HIGH (ref 10–40)
B-OH-BUTYR SERPL-SCNC: 0.1 MMOL/L — SIGNIFICANT CHANGE UP
BASOPHILS # BLD AUTO: 0.01 K/UL — SIGNIFICANT CHANGE UP (ref 0–0.2)
BASOPHILS NFR BLD AUTO: 0.1 % — SIGNIFICANT CHANGE UP (ref 0–2)
BILIRUB SERPL-MCNC: 0.2 MG/DL — SIGNIFICANT CHANGE UP (ref 0.2–1.2)
BUN SERPL-MCNC: 26 MG/DL — HIGH (ref 7–23)
BUN SERPL-MCNC: 32 MG/DL — HIGH (ref 7–23)
CALCIUM SERPL-MCNC: 8.6 MG/DL — SIGNIFICANT CHANGE UP (ref 8.4–10.5)
CALCIUM SERPL-MCNC: 9.3 MG/DL — SIGNIFICANT CHANGE UP (ref 8.4–10.5)
CHLORIDE SERPL-SCNC: 100 MMOL/L — SIGNIFICANT CHANGE UP (ref 96–108)
CHLORIDE SERPL-SCNC: 108 MMOL/L — SIGNIFICANT CHANGE UP (ref 96–108)
CO2 SERPL-SCNC: 26 MMOL/L — SIGNIFICANT CHANGE UP (ref 22–31)
CO2 SERPL-SCNC: 28 MMOL/L — SIGNIFICANT CHANGE UP (ref 22–31)
CREAT SERPL-MCNC: 1.42 MG/DL — HIGH (ref 0.5–1.3)
CREAT SERPL-MCNC: 1.7 MG/DL — HIGH (ref 0.5–1.3)
EGFR: 30 ML/MIN/1.73M2 — LOW
EGFR: 38 ML/MIN/1.73M2 — LOW
EOSINOPHIL # BLD AUTO: 0.03 K/UL — SIGNIFICANT CHANGE UP (ref 0–0.5)
EOSINOPHIL NFR BLD AUTO: 0.4 % — SIGNIFICANT CHANGE UP (ref 0–6)
GLUCOSE BLDC GLUCOMTR-MCNC: 104 MG/DL — HIGH (ref 70–99)
GLUCOSE BLDC GLUCOMTR-MCNC: 108 MG/DL — HIGH (ref 70–99)
GLUCOSE BLDC GLUCOMTR-MCNC: 135 MG/DL — HIGH (ref 70–99)
GLUCOSE BLDC GLUCOMTR-MCNC: 137 MG/DL — HIGH (ref 70–99)
GLUCOSE BLDC GLUCOMTR-MCNC: 162 MG/DL — HIGH (ref 70–99)
GLUCOSE BLDC GLUCOMTR-MCNC: 162 MG/DL — HIGH (ref 70–99)
GLUCOSE BLDC GLUCOMTR-MCNC: 179 MG/DL — HIGH (ref 70–99)
GLUCOSE BLDC GLUCOMTR-MCNC: 190 MG/DL — HIGH (ref 70–99)
GLUCOSE BLDC GLUCOMTR-MCNC: 66 MG/DL — LOW (ref 70–99)
GLUCOSE BLDC GLUCOMTR-MCNC: 70 MG/DL — SIGNIFICANT CHANGE UP (ref 70–99)
GLUCOSE BLDC GLUCOMTR-MCNC: 71 MG/DL — SIGNIFICANT CHANGE UP (ref 70–99)
GLUCOSE BLDC GLUCOMTR-MCNC: 75 MG/DL — SIGNIFICANT CHANGE UP (ref 70–99)
GLUCOSE BLDC GLUCOMTR-MCNC: 96 MG/DL — SIGNIFICANT CHANGE UP (ref 70–99)
GLUCOSE BLDC GLUCOMTR-MCNC: 98 MG/DL — SIGNIFICANT CHANGE UP (ref 70–99)
GLUCOSE SERPL-MCNC: 344 MG/DL — HIGH (ref 70–99)
GLUCOSE SERPL-MCNC: 87 MG/DL — SIGNIFICANT CHANGE UP (ref 70–99)
HCT VFR BLD CALC: 27.6 % — LOW (ref 34.5–45)
HGB BLD-MCNC: 9 G/DL — LOW (ref 11.5–15.5)
IMM GRANULOCYTES NFR BLD AUTO: 0.5 % — SIGNIFICANT CHANGE UP (ref 0–0.9)
LYMPHOCYTES # BLD AUTO: 0.73 K/UL — LOW (ref 1–3.3)
LYMPHOCYTES # BLD AUTO: 8.6 % — LOW (ref 13–44)
MAGNESIUM SERPL-MCNC: 1.9 MG/DL — SIGNIFICANT CHANGE UP (ref 1.6–2.6)
MCHC RBC-ENTMCNC: 28.9 PG — SIGNIFICANT CHANGE UP (ref 27–34)
MCHC RBC-ENTMCNC: 32.6 G/DL — SIGNIFICANT CHANGE UP (ref 32–36)
MCV RBC AUTO: 88.7 FL — SIGNIFICANT CHANGE UP (ref 80–100)
MONOCYTES # BLD AUTO: 0.48 K/UL — SIGNIFICANT CHANGE UP (ref 0–0.9)
MONOCYTES NFR BLD AUTO: 5.7 % — SIGNIFICANT CHANGE UP (ref 2–14)
NEUTROPHILS # BLD AUTO: 7.19 K/UL — SIGNIFICANT CHANGE UP (ref 1.8–7.4)
NEUTROPHILS NFR BLD AUTO: 84.7 % — HIGH (ref 43–77)
NRBC # BLD: 0 /100 WBCS — SIGNIFICANT CHANGE UP (ref 0–0)
PHOSPHATE SERPL-MCNC: 2.5 MG/DL — SIGNIFICANT CHANGE UP (ref 2.5–4.5)
PLATELET # BLD AUTO: 176 K/UL — SIGNIFICANT CHANGE UP (ref 150–400)
POTASSIUM SERPL-MCNC: 3.7 MMOL/L — SIGNIFICANT CHANGE UP (ref 3.5–5.3)
POTASSIUM SERPL-MCNC: 3.9 MMOL/L — SIGNIFICANT CHANGE UP (ref 3.5–5.3)
POTASSIUM SERPL-SCNC: 3.7 MMOL/L — SIGNIFICANT CHANGE UP (ref 3.5–5.3)
POTASSIUM SERPL-SCNC: 3.9 MMOL/L — SIGNIFICANT CHANGE UP (ref 3.5–5.3)
PROT SERPL-MCNC: 5 G/DL — LOW (ref 6–8.3)
RBC # BLD: 3.11 M/UL — LOW (ref 3.8–5.2)
RBC # FLD: 15.4 % — HIGH (ref 10.3–14.5)
SODIUM SERPL-SCNC: 134 MMOL/L — LOW (ref 135–145)
SODIUM SERPL-SCNC: 143 MMOL/L — SIGNIFICANT CHANGE UP (ref 135–145)
WBC # BLD: 8.48 K/UL — SIGNIFICANT CHANGE UP (ref 3.8–10.5)
WBC # FLD AUTO: 8.48 K/UL — SIGNIFICANT CHANGE UP (ref 3.8–10.5)

## 2024-10-31 RX ORDER — FLUCONAZOLE, SODIUM CHLORIDE 2 MG/ML
150 INJECTION INTRAVENOUS ONCE
Refills: 0 | Status: COMPLETED | OUTPATIENT
Start: 2024-10-31 | End: 2024-10-31

## 2024-10-31 RX ORDER — MAGNESIUM SULFATE IN 0.9% NACL 2 G/50 ML
1 INTRAVENOUS SOLUTION, PIGGYBACK (ML) INTRAVENOUS ONCE
Refills: 0 | Status: COMPLETED | OUTPATIENT
Start: 2024-10-31 | End: 2024-10-31

## 2024-10-31 RX ORDER — INSULIN LISPRO 100/ML
VIAL (ML) SUBCUTANEOUS
Refills: 0 | Status: DISCONTINUED | OUTPATIENT
Start: 2024-10-31 | End: 2024-11-04

## 2024-10-31 RX ORDER — CONJUGATED ESTROGENS 0.62 MG/G
0.5 CREAM VAGINAL ONCE
Refills: 0 | Status: DISCONTINUED | OUTPATIENT
Start: 2024-11-01 | End: 2024-11-04

## 2024-10-31 RX ORDER — INSULIN LISPRO 100/ML
VIAL (ML) SUBCUTANEOUS AT BEDTIME
Refills: 0 | Status: DISCONTINUED | OUTPATIENT
Start: 2024-10-31 | End: 2024-11-04

## 2024-10-31 RX ORDER — INSULIN GLARGINE,HUM.REC.ANLOG 100/ML
10 VIAL (ML) SUBCUTANEOUS AT BEDTIME
Refills: 0 | Status: DISCONTINUED | OUTPATIENT
Start: 2024-10-31 | End: 2024-11-01

## 2024-10-31 RX ORDER — RIVASTIGMINE TARTRATE 4.5 MG/1
1 CAPSULE ORAL EVERY 24 HOURS
Refills: 0 | Status: DISCONTINUED | OUTPATIENT
Start: 2024-10-31 | End: 2024-11-04

## 2024-10-31 RX ORDER — POTASSIUM CHLORIDE 10 MEQ
10 TABLET, EXTENDED RELEASE ORAL ONCE
Refills: 0 | Status: COMPLETED | OUTPATIENT
Start: 2024-10-31 | End: 2024-10-31

## 2024-10-31 RX ADMIN — CHLORHEXIDINE GLUCONATE 1 APPLICATION(S): 40 SOLUTION TOPICAL at 06:30

## 2024-10-31 RX ADMIN — Medication 70 MILLILITER(S): at 23:47

## 2024-10-31 RX ADMIN — Medication 50 MILLILITER(S): at 06:56

## 2024-10-31 RX ADMIN — FLUCONAZOLE, SODIUM CHLORIDE 150 MILLIGRAM(S): 2 INJECTION INTRAVENOUS at 21:59

## 2024-10-31 RX ADMIN — Medication 1: at 16:24

## 2024-10-31 RX ADMIN — Medication 50 MILLILITER(S): at 18:00

## 2024-10-31 RX ADMIN — ESCITALOPRAM 5 MILLIGRAM(S): 10 TABLET, FILM COATED ORAL at 17:31

## 2024-10-31 RX ADMIN — HEPARIN SODIUM 5000 UNIT(S): 10000 INJECTION INTRAVENOUS; SUBCUTANEOUS at 17:31

## 2024-10-31 RX ADMIN — HEPARIN SODIUM 5000 UNIT(S): 10000 INJECTION INTRAVENOUS; SUBCUTANEOUS at 06:13

## 2024-10-31 RX ADMIN — LOSARTAN POTASSIUM 25 MILLIGRAM(S): 25 TABLET ORAL at 06:13

## 2024-10-31 RX ADMIN — RIVASTIGMINE TARTRATE 1 PATCH: 4.5 CAPSULE ORAL at 16:26

## 2024-10-31 RX ADMIN — Medication 100 MILLIEQUIVALENT(S): at 07:01

## 2024-10-31 RX ADMIN — Medication 2.5 MILLIGRAM(S): at 21:46

## 2024-10-31 RX ADMIN — Medication 200 MILLILITER(S): at 00:00

## 2024-10-31 RX ADMIN — Medication 25 MILLILITER(S): at 04:00

## 2024-10-31 RX ADMIN — Medication 30 MILLILITER(S): at 06:56

## 2024-10-31 RX ADMIN — Medication 10 UNIT(S): at 23:43

## 2024-10-31 RX ADMIN — Medication 100 GRAM(S): at 07:01

## 2024-10-31 NOTE — CONSULT NOTE ADULT - NS ATTEST RISK PROBLEM GEN_ALL_CORE FT
Pt with resolving DKA but with poor PO intake and at risk for recurrent ketosis
acute illness posing risk to life  2 or more chronic illnesses with progression of disease  need to review inpatient and outpatient notes, labs and imaging  need for independent historian for collateral information  coordination of complex transitions of care between assisted living facility and acute hospital.

## 2024-10-31 NOTE — CONSULT NOTE ADULT - ATTENDING COMMENTS
Pt seen on rounds this afternoon.  79-yo woman who resides in [facility] with HTN, type 1 DM, osteoporosis (on Prolia), dementia, and pancreatic CA (s/p Whipple and adjuvant chemo in 2020, currently PIEDAD) who was sent to the hospital because of marked hyperglycemia noted at the [facility], and was found to be in severe DKA--Glucose 1008 mg%, BHB > 8 mM, gap 32, pH 7.0  Has been treated for a UTI within the past 2 weeks, but this appeared to be uncomplicated, and she responded to Bactrim  Her history is quite limited due to her dementia and lack of fingerstick/medication records from the facility  There is a suggestion of intermittent dietary non-compliance with outside food, and the consistency of insulin administration cannot be documented.  Fingersticks at the facility are apparently done premeal but not at bedtime.  She has seen Dr. Sabillon in our division on 3 occasions since August, most recently on 10/7.  Her Tresiba was decreased from 10 to 9 units because of occasional AM hypoglycemia.  Her premeal Lyumjev was at 5 units, but he added a low-dose correction scale and asked to have bedtime fingersticks done.  She was treated with an insulin drip post admission, transitioned to Lantus 15 units last night, but was hypoglycemic this morning to 66 mg%.  Subsequent fingersticks have been 108/104 without premeal insulin, which was not started because of poor PO intake  The etiology of her DKA is unclear.  Her UTI did not involve pyelonephritis or an obvious septic picture, and has now cleared.  Her dietary lapses could explain the hyperglycemia, (but only partially), but not the ketosis--which would have required either a significant superimposed illness or a lapse in insulin therapy--but we cannot tell whether doses were missed, refused, etc  --At this point will decrease the Lantus to 10 units for tonight.  --Will also add D5/NS (or LR) at 70 cc/hr to provide hydration and suppress ketosis (since her PO intake is so poor)  --Use low-dose sliding scale
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
## 2024-10-31 NOTE — PROVIDER CONTACT NOTE (OTHER) - BACKGROUND
pt admitted from living assistance facility per cousin whose health care proxy; pt glucose was not regulated and kept getting more confused
Admitted for FS >1000, weaned from insulin gtt @ 1:45am. FS for 3am 98. 4am FS 70

## 2024-10-31 NOTE — PROGRESS NOTE ADULT - PROBLEM SELECTOR PLAN 2
.  -at baseline, the patient uses walker for ambulation. Has had multiple falls in the past due to poor balance in the setting of cognitive impairment.  -she is at risk of falls and deconditioning in the hospital.    -falls precautions in place  -OOB to chair with assistance as soon as medically safe to prevent deconditioning  -please consult PT & OT once clinically stable.

## 2024-10-31 NOTE — CONSULT NOTE ADULT - SUBJECTIVE AND OBJECTIVE BOX
HISTORY OF PRESENT ILLNESS  IVETTE ROSE is a 78yo F resident in [facility], w/ PMHx of dementia, osteoporosis (on Prolia), anxiety and depression, T1DM, HTN and history of locally advanced HARSHA pancreatic cancer (T2N2) diagnosed 2020 and now status-post Whipple and adjuvant chemotherapy - presenting to ED w/ elevated glucose levels, lethargy/disorientaion & generalized weakness. Of note, 2 wks ago pt started c/o genital pruritus, however unable to endorse if dysuria/frequency/urgency. UA performed at [facility] showed UTI,  tx'd with Bactrim outpatient however pt's confusion did not reside. History provided mostly by pt's cousin at bedside who states that pt has been having trouble w/ glucose control while at [facility], with few weeks-months elevated glucose readings (>300 per cousin) despite taking prescribed doses of basal/bolus insulin.   Patient seen and examined at bedside. Agitated and yelling to get out of bed because she needs to urinate. She denies any fever or chills but continuously keeps stating that she has to urinate. She denies any headaches or chest pain. Any additional ROS unable to be obtained given patient non-compliance to interview.    On admission, found to have DKA. Na 121, K+ 6.3, Bicarb 4, Gap 32, pH 7.0, BHB > 8.0 Glucose 1008. Admitted to MICU and started on insulin gtt. Highest rate 6, mostly at 1 unit per hour yesterday, stopped at 1am. d5LR at 200 also stopped at 1am. She received DKA now resolved.     Endocrinology has been consulted for Diabetes Management.    DIABETES HISTORY  - Age at diagnosis:   - Diabetes managed outpatient by:  - Current Therapy:  - History of other regimens:   - Home glucose readings:  - History of DKA/HHS:   - Diabetic Complications:   - Diet:        FAMILY HISTORY  - Diabetes:    SOCIAL HISTORY  - Work:  - Alcohol:  - Smoking:      CURRENT MEDICATIONS  amLODIPine   Tablet 2.5 milliGRAM(s) Oral at bedtime  chlorhexidine 2% Cloths 1 Application(s) Topical <User Schedule>  dextrose 5%. 1000 milliLiter(s) IV Continuous <Continuous>  dextrose 5%. 1000 milliLiter(s) IV Continuous <Continuous>  dextrose 50% Injectable 25 Gram(s) IV Push once  dextrose Oral Gel 15 Gram(s) Oral once PRN  escitalopram 5 milliGRAM(s) Oral daily  glucagon  Injectable 1 milliGRAM(s) IntraMuscular once  heparin   Injectable 5000 Unit(s) SubCutaneous every 12 hours  insulin glargine Injectable (LANTUS) 10 Unit(s) SubCutaneous at bedtime  insulin lispro (ADMELOG) corrective regimen sliding scale   SubCutaneous three times a day before meals  insulin lispro (ADMELOG) corrective regimen sliding scale   SubCutaneous at bedtime  losartan 25 milliGRAM(s) Oral daily  rivastigmine patch  4.6 mG/24 Hr(s) 1 Patch Transdermal every 24 hours      REVIEW OF SYSTEMS  Constitutional:  Negative fever, chills   Cardiovascular:  Negative for chest pain or palpitations.  Respiratory:  Negative for cough, wheezing, or shortness of breath.   Gastrointestinal:  Negative for nausea, vomiting, diarrhea, constipation, or abdominal pain.  Genitourinary:  Negative frequency, urgency or dysuria.  Neurologic:  No headache, confusion, dizziness    PHYSICAL EXAM  Vital Signs Last 24 Hrs  T(C): 37.3 (31 Oct 2024 14:01), Max: 37.3 (31 Oct 2024 01:16)  T(F): 99.1 (31 Oct 2024 14:01), Max: 99.1 (31 Oct 2024 01:16)  HR: 82 (31 Oct 2024 14:00) (70 - 93)  BP: 147/70 (31 Oct 2024 14:00) (86/47 - 147/70)  BP(mean): 100 (31 Oct 2024 14:00) (64 - 100)  RR: 20 (31 Oct 2024 11:00) (15 - 26)  SpO2: 98% (31 Oct 2024 14:00) (95% - 100%)    Parameters below as of 31 Oct 2024 11:00  Patient On (Oxygen Delivery Method): room air      Constitutional: Awake, alert  HEENT: Normocephalic, atraumatic, BHAVANI  Neck: supple, no acanthosis, no thyromegaly or palpable thyroid nodules.  Respiratory: Lungs clear to ausculation bilaterally.   Cardiovascular: regular rate and rhythm  GI: soft, non-tender, non-distended, bowel sounds present  Extremities: No lower extremity edema, peripheral pulses present.     LABS  CBC - WBC/HGB/HTC/PLT: 8.48/9.0/27.6/176 (10-31-24)  BMP: Na/K/Cl/Bicarb/BUN/Cr/Gluc: 134/3.7/100/26/26/1.42/344 (10-31-24)  Anion Gap: 8 (10-31-24)  eGFR: 38 (10-31-24)  Calcium: 8.6 (10-31-24)  Phosphorus: -- (10-31-24)  Magnesium: -- (10-31-24)  LFT - Alb/Tprot/Tbili/Dbili/AlkPhos/ALT/AST: 3.0/--/0.2/--/95/47/109 (10-31-24)      CBC - WBC/HGB/HTC/PLT: 8.48/9.0/27.6/176 (10-31-24)BMP: Na/K/Cl/Bicarb/BUN/Cr/Gluc: 134/3.7/100/26/26/1.42/344 (10-31-24)  Anion Gap: 8 (10-31-24)  eGFR: 38 (10-31-24)  Calcium: 8.6 (10-31-24)  Phosphorus: -- (10-31-24)  Magnesium: -- (10-31-24)    CAPILLARY BLOOD GLUCOSE & INSULIN RECEIVED  >600 mg/dL (10-30 @ 09:31)  >600 mg/dL (10-30 @ 10:54)  >600 mg/dL (10-30 @ 11:11)  >600 mg/dL (10-30 @ 11:27)  >600 mg/dL (10-30 @ 11:42)  >600 mg/dL (10-30 @ 11:59)  >600 mg/dL (10-30 @ 13:04)  530 mg/dL (10-30 @ 14:06)  410 mg/dL (10-30 @ 14:58)  315 mg/dL (10-30 @ 15:57)  250 mg/dL (10-30 @ 17:00)  222 mg/dL (10-30 @ 18:06)  226 mg/dL (10-30 @ 19:22)  240 mg/dL (10-30 @ 19:56)  183 mg/dL (10-30 @ 21:03)  176 mg/dL (10-30 @ 22:03)  137 mg/dL (10-30 @ 23:05)  190 mg/dL (10-30 @ 23:58)  179 mg/dL (10-31 @ 00:59)  135 mg/dL (10-31 @ 02:00)  98 mg/dL (10-31 @ 03:05)  70 mg/dL (10-31 @ 03:57)  96 mg/dL (10-31 @ 04:56)  66 mg/dL (10-31 @ 06:33)  162 mg/dL (10-31 @ 07:28)  108 mg/dL (10-31 @ 08:59)  75 mg/dL (10-31 @ 11:06)  104 mg/dL (10-31 @ 11:24)        10-30-24 @ 07:01  -  10-31-24 @ 07:00  --------------------------------------------------------  IN: 2828.7 mL / OUT: 1375 mL / NET: 1453.7 mL    10-31-24 @ 07:01  -  10-31-24 @ 15:01  --------------------------------------------------------  IN: 0 mL / OUT: 150 mL / NET: -150 mL        ASSESSMENT / RECOMMENDATIONS  IVETTE STEELEKMAN is a 78yo F resident in [facility], w/ PMHx of dementia, osteoporosis (on Prolia), anxiety and depression, T1DM, HTN and history of locally advanced HARSHA pancreatic cancer (T2N2) diagnosed 2020 and now status-post Whipple and adjuvant chemotherapy - presenting to ED w/ elevated glucose levels, lethargy/disorientaion & generalized weakness. Found to be in DKA now resolved.       A1C: BUN: 26  Creatinine: 1.42  GFR: 38  Weight: 47.6:     #Type 1 diabetes mellitus with hyperglycemia  #S/p Whipple   - Please keep lantus   units at bedtime.   - Keep lispro   units before each meal.  - Continue lispro moderate dose sliding scale before meals and at bedtime.  - Patient's fingerstick glucose goal is 100-180 mg/dL.    - Discharge recommendations to be discussed.   - Patient can follow up at discharge with [facility] by calling [phone] to make an appointment.      Case discussed with Dr. Guallpa. Primary team updated.       Lakeshia Aleman   Endocrinology Fellow    Service Pager: [phone]  HISTORY OF PRESENT ILLNESS  IVETTE ROSE is a 80yo F resident in [facility], w/ PMHx of dementia, osteoporosis (on Prolia), anxiety and depression, T1DM, HTN and history of locally advanced HARSHA pancreatic cancer (T2N2) diagnosed 2020 and now status-post Whipple and adjuvant chemotherapy - presenting to ED w/ elevated glucose levels, lethargy/disorientaion & generalized weakness. Of note, 2 wks ago pt started c/o genital pruritus, however unable to endorse if dysuria/frequency/urgency. UA performed at [facility] showed UTI,  tx'd with Bactrim outpatient however pt's confusion did not reside. History provided mostly by pt's cousin at bedside who states that pt has been having trouble w/ glucose control while at [facility], with few weeks-months elevated glucose readings (>300 per cousin) despite taking prescribed doses of basal/bolus insulin.   Patient seen and examined at bedside. Agitated and yelling to get out of bed because she needs to urinate. She denies any fever or chills but continuously keeps stating that she has to urinate. She denies any headaches or chest pain. Any additional ROS unable to be obtained given patient non-compliance to interview.    On admission, found to have DKA. Na 121, K+ 6.3, Bicarb 4, Gap 32, pH 7.0, BHB > 8.0 Glucose 1008. Admitted to MICU and started on insulin gtt. Highest rate 6, mostly at 1 unit per hour yesterday, stopped at 1am after DKA resolved. d5LR at 200 also stopped at 1am. She was transitioned with lantus 15 units last night. This am. FS low s/p dextrose pushes. Patient received Seroquel for agitation last night and was still lethargic in the am, she did not eat breakfast. Currently at bedside patient is AOx2, alert awake but not coherent and with poor short term memory. Unable to recall medical history clearly. Follows simple commands.     Endocrinology has been consulted for Diabetes Management.    DIABETES HISTORY  - Unable to obtain full history from patient due to mental status. She does state that she was diagnosed with diabetes 4 years ago, and is on insulin but cannot elaborate States that she "fell." Possible became diabetic post-whipple.     CURRENT MEDICATIONS  amLODIPine   Tablet 2.5 milliGRAM(s) Oral at bedtime  chlorhexidine 2% Cloths 1 Application(s) Topical <User Schedule>  dextrose 5%. 1000 milliLiter(s) IV Continuous <Continuous>  dextrose 5%. 1000 milliLiter(s) IV Continuous <Continuous>  dextrose 50% Injectable 25 Gram(s) IV Push once  dextrose Oral Gel 15 Gram(s) Oral once PRN  escitalopram 5 milliGRAM(s) Oral daily  glucagon  Injectable 1 milliGRAM(s) IntraMuscular once  heparin   Injectable 5000 Unit(s) SubCutaneous every 12 hours  insulin glargine Injectable (LANTUS) 10 Unit(s) SubCutaneous at bedtime  insulin lispro (ADMELOG) corrective regimen sliding scale   SubCutaneous three times a day before meals  insulin lispro (ADMELOG) corrective regimen sliding scale   SubCutaneous at bedtime  losartan 25 milliGRAM(s) Oral daily  rivastigmine patch  4.6 mG/24 Hr(s) 1 Patch Transdermal every 24 hours      REVIEW OF SYSTEMS  Constitutional:  Negative fever, chills   Cardiovascular:  Negative for chest pain or palpitations.  Respiratory:  Negative for cough, wheezing, or shortness of breath.   Gastrointestinal:  Negative for nausea, vomiting, diarrhea, constipation, or abdominal pain.  Genitourinary:  Negative frequency, urgency or dysuria.  Neurologic:  No headache, confusion, dizziness    PHYSICAL EXAM  Vital Signs Last 24 Hrs  T(C): 37.3 (31 Oct 2024 14:01), Max: 37.3 (31 Oct 2024 01:16)  T(F): 99.1 (31 Oct 2024 14:01), Max: 99.1 (31 Oct 2024 01:16)  HR: 82 (31 Oct 2024 14:00) (70 - 93)  BP: 147/70 (31 Oct 2024 14:00) (86/47 - 147/70)  BP(mean): 100 (31 Oct 2024 14:00) (64 - 100)  RR: 20 (31 Oct 2024 11:00) (15 - 26)  SpO2: 98% (31 Oct 2024 14:00) (95% - 100%)    Parameters below as of 31 Oct 2024 11:00  Patient On (Oxygen Delivery Method): room air      Constitutional: Awake, alert  HEENT: Normocephalic, atraumatic, BHAVANI  Neck: supple, no acanthosis, no thyromegaly or palpable thyroid nodules.  Respiratory: Lungs clear to ausculation bilaterally.   Cardiovascular: regular rate and rhythm  GI: soft, non-tender, non-distended, bowel sounds present  Extremities: No lower extremity edema, peripheral pulses present.     LABS  CBC - WBC/HGB/HTC/PLT: 8.48/9.0/27.6/176 (10-31-24)  BMP: Na/K/Cl/Bicarb/BUN/Cr/Gluc: 134/3.7/100/26/26/1.42/344 (10-31-24)  Anion Gap: 8 (10-31-24)  eGFR: 38 (10-31-24)  Calcium: 8.6 (10-31-24)  Phosphorus: -- (10-31-24)  Magnesium: -- (10-31-24)  LFT - Alb/Tprot/Tbili/Dbili/AlkPhos/ALT/AST: 3.0/--/0.2/--/95/47/109 (10-31-24)      CBC - WBC/HGB/HTC/PLT: 8.48/9.0/27.6/176 (10-31-24)BMP: Na/K/Cl/Bicarb/BUN/Cr/Gluc: 134/3.7/100/26/26/1.42/344 (10-31-24)  Anion Gap: 8 (10-31-24)  eGFR: 38 (10-31-24)  Calcium: 8.6 (10-31-24)  Phosphorus: -- (10-31-24)  Magnesium: -- (10-31-24)    CAPILLARY BLOOD GLUCOSE & INSULIN RECEIVED  >600 mg/dL (10-30 @ 09:31)  >600 mg/dL (10-30 @ 10:54)  >600 mg/dL (10-30 @ 11:11)  >600 mg/dL (10-30 @ 11:27)  >600 mg/dL (10-30 @ 11:42)  >600 mg/dL (10-30 @ 11:59)  >600 mg/dL (10-30 @ 13:04)  530 mg/dL (10-30 @ 14:06)  410 mg/dL (10-30 @ 14:58)  315 mg/dL (10-30 @ 15:57)  250 mg/dL (10-30 @ 17:00)  222 mg/dL (10-30 @ 18:06)  226 mg/dL (10-30 @ 19:22)  240 mg/dL (10-30 @ 19:56)  183 mg/dL (10-30 @ 21:03)  176 mg/dL (10-30 @ 22:03)  137 mg/dL (10-30 @ 23:05)  190 mg/dL (10-30 @ 23:58)  179 mg/dL (10-31 @ 00:59)  135 mg/dL (10-31 @ 02:00)  98 mg/dL (10-31 @ 03:05)  70 mg/dL (10-31 @ 03:57)  96 mg/dL (10-31 @ 04:56)  66 mg/dL (10-31 @ 06:33)  162 mg/dL (10-31 @ 07:28)  108 mg/dL (10-31 @ 08:59)  75 mg/dL (10-31 @ 11:06)  104 mg/dL (10-31 @ 11:24)        10-30-24 @ 07:01  -  10-31-24 @ 07:00  --------------------------------------------------------  IN: 2828.7 mL / OUT: 1375 mL / NET: 1453.7 mL    10-31-24 @ 07:01  -  10-31-24 @ 15:01  --------------------------------------------------------  IN: 0 mL / OUT: 150 mL / NET: -150 mL        ASSESSMENT / RECOMMENDATIONS  IVETTE STEELEKMAN is a 80yo F resident in [facility], w/ PMHx of dementia, osteoporosis (on Prolia), anxiety and depression, T1DM, HTN and history of locally advanced HARSHA pancreatic cancer (T2N2) diagnosed 2020 and now status-post Whipple and adjuvant chemotherapy - presenting to ED w/ elevated glucose levels, lethargy/disorientaion & generalized weakness. Found to be in DKA now resolved.       A1C: BUN: 26  Creatinine: 1.42  GFR: 38  Weight: 47.6:     #Type 1 diabetes mellitus with hyperglycemia  #S/p [place]   - Please keep lantus   units at bedtime.   - Keep lispro   units before each meal.  - Continue lispro moderate dose sliding scale before meals and at bedtime.  - Patient's fingerstick glucose goal is 100-180 mg/dL.    - Discharge recommendations to be discussed.   - Patient can follow up at discharge with [facility] by calling [phone] to make an appointment.      Case discussed with Dr. Guallpa. Primary team updated.       Lakeshia Aleman   Endocrinology Fellow    Service Pager: [phone]  HISTORY OF PRESENT ILLNESS  IVETTE ROSE is a 78yo F resident in [facility], w/ PMHx of dementia, osteoporosis (on Prolia), anxiety and depression, T1DM, HTN and history of locally advanced HARSHA pancreatic cancer (T2N2) diagnosed 2020 and now status-post Whipple and adjuvant chemotherapy - presenting to ED w/ elevated glucose levels, lethargy/disorientaion & generalized weakness. Of note, 2 wks ago pt started c/o genital pruritus, however unable to endorse if dysuria/frequency/urgency. UA performed at [facility] showed UTI,  tx'd with Bactrim outpatient however pt's confusion did not reside. History provided mostly by pt's cousin at bedside who states that pt has been having trouble w/ glucose control while at [facility], with few weeks-months elevated glucose readings (>300 per cousin) despite taking prescribed doses of basal/bolus insulin?     On admission, found to have DKA. Na 121, K+ 6.3, Bicarb 4, Gap 32, pH 7.0, BHB > 8.0 Glucose 1008. Admitted to MICU and started on insulin gtt. Highest rate 6, mostly at 1 unit per hour yesterday, stopped at 1am after DKA resolved. d5LR at 200 also stopped at 1am. She was transitioned with lantus 15 units last night. This am. FS low s/p dextrose pushes. Patient received Seroquel for agitation last night and was still lethargic in the am, she did not eat breakfast. Currently at bedside patient is AOx1-2, alert awake but not coherent and with poor short term memory. Unable to recall medical history clearly. Follows simple commands.     Endocrinology has been consulted for Diabetes Management.    DIABETES HISTORY  - After chart checking, discovered that patient is listed under two MRNs ([id number] and [id number]). Patient follows with Dr. Connor Sabillon for Endocrinology last seen in office for follow up 10/7/24: Per his notes patient was having more episodes of hyperglycemia. There were some dietary lapses as well as patient was noncompliant with diabetic diet at facility. There are multiple chart notes from [facility] for the past two months reporting increasing urinary frequency, nocturia, FS ranging from 300-500 at the facility with extra insulin coverage being provided for those times. Per Dr. Sabillon, patient was having multiple lows in the am which is why lantus was decreased from 10 to 9 then eventually 8 units. Liumjev kept at 5 units pre-meal, with a low insulin sliding scale and a bedtime scale starting at 250.     DIABETES HISTORY (Based on Dr. Sabillon's notes)   - Age at Diagnosis: 37 years old.  - Symptoms at Time of Diagnosis: Weight loss and abdominal pain.  - Current Therapy: Tresiba 8 units daily, Liumjev 5 units three times daily before meals, and a Liumjev low dose sliding scale before meals.  - History of Other Regimens: She does not recall the name of the pills she was initially prescribed, which she took for less than a year before switching to insulin. She has used various types of insulin over the years but does not remember the specifics.  - History of Hypoglycemia: She is experiencing occasional morning hypoglycemic episodes into the 50s mg/dL.  - History of DKA/HHS: She reports never having an episode of diabetic ketoacidosis in the past.  - Complications: She was last seen by an ophthalmologist less than a year ago, without any signs of retinopathy. She has mild neuropathy symptoms (e.g., numbness/tingling); however, her monofilament test was within normal limits (8/19/24).  - Home FSG: Her glucose levels have been checked 3 times daily at the [facility]. Her glucose levels are mostly in the low 100s mg/dL. However, she has occasional postprandial spikes into the 300-400s mg/dL. Additionally, glucose is occasionally low in the morning in the 50-90s mg/dL range.  - Diet: History is unreliable due to the patient's impaired memory.  - Breakfast: Scrambled eggs, one piece of toast, fruit (e.g., cantaloupe or blueberries), and coffee with milk.  - Lunch: Roast beef, a slice of rye bread, salad with mushrooms, and fruit (strawberries and blueberries).  - Dinner: Chicken with vegetables and either barley or a baked potato.  - Snacks: Occasionally has a rice cracker.  ?  OSTEOPOROSIS:  - Diagnosis: Many years ago. She has been on Prolia for approximately two years. She says that she may have been on pills before but doesn't remember the specifics; she thinks she was switched to Prolia as the pills didn't work.  - Bone Density Scan: Her last scan was less than two years ago.  - Fractures: Sustained an L1-L2 compression fracture in June 2024 after falling from a standing position. She has also fractured her clavicle, pelvis, and ribs in the past two years. Her most recent fall was on July 30, 2024.  - Current Smoking: Denies.  - Glucocorticoid Use: Denies.  - Rheumatoid Arthritis: Denies.  - Alcohol Consumption (3 or more units/day): Denies.  - Parental History of Hip Fracture: Unknown.  She was initially on Risedronate until January 2016, after which she was switched to Prolia injections every six months starting in February 2017. The reason for the change is unclear, but the patient believes it was because the therapy was no longer effective. Her last Prolia injection was on April 1st, 2024.    CURRENT MEDICATIONS  amLODIPine   Tablet 2.5 milliGRAM(s) Oral at bedtime  chlorhexidine 2% Cloths 1 Application(s) Topical <User Schedule>  dextrose 5%. 1000 milliLiter(s) IV Continuous <Continuous>  dextrose 5%. 1000 milliLiter(s) IV Continuous <Continuous>  dextrose 50% Injectable 25 Gram(s) IV Push once  dextrose Oral Gel 15 Gram(s) Oral once PRN  escitalopram 5 milliGRAM(s) Oral daily  glucagon  Injectable 1 milliGRAM(s) IntraMuscular once  heparin   Injectable 5000 Unit(s) SubCutaneous every 12 hours  insulin glargine Injectable (LANTUS) 10 Unit(s) SubCutaneous at bedtime  insulin lispro (ADMELOG) corrective regimen sliding scale   SubCutaneous three times a day before meals  insulin lispro (ADMELOG) corrective regimen sliding scale   SubCutaneous at bedtime  losartan 25 milliGRAM(s) Oral daily  rivastigmine patch  4.6 mG/24 Hr(s) 1 Patch Transdermal every 24 hours      REVIEW OF SYSTEMS  Constitutional:  Negative fever, chills   Cardiovascular:  Negative for chest pain or palpitations.  Respiratory:  Negative for cough, wheezing, or shortness of breath.   Gastrointestinal:  Negative for nausea, vomiting, diarrhea, constipation, or abdominal pain.  Genitourinary:  Negative frequency, urgency or dysuria.  Neurologic:  No headache, confusion, dizziness    PHYSICAL EXAM  Vital Signs Last 24 Hrs  T(C): 37.3 (31 Oct 2024 14:01), Max: 37.3 (31 Oct 2024 01:16)  T(F): 99.1 (31 Oct 2024 14:01), Max: 99.1 (31 Oct 2024 01:16)  HR: 82 (31 Oct 2024 14:00) (70 - 93)  BP: 147/70 (31 Oct 2024 14:00) (86/47 - 147/70)  BP(mean): 100 (31 Oct 2024 14:00) (64 - 100)  RR: 20 (31 Oct 2024 11:00) (15 - 26)  SpO2: 98% (31 Oct 2024 14:00) (95% - 100%)    Parameters below as of 31 Oct 2024 11:00  Patient On (Oxygen Delivery Method): room air      Constitutional: Awake, alert  HEENT: Normocephalic, atraumatic, BHAVANI  Neck: supple, no acanthosis, no thyromegaly or palpable thyroid nodules.  Respiratory: Lungs clear to ausculation bilaterally.   Cardiovascular: regular rate and rhythm  GI: soft, non-tender, non-distended, bowel sounds present  Extremities: No lower extremity edema, peripheral pulses present.     LABS  CBC - WBC/HGB/HTC/PLT: 8.48/9.0/27.6/176 (10-31-24)  BMP: Na/K/Cl/Bicarb/BUN/Cr/Gluc: 134/3.7/100/26/26/1.42/344 (10-31-24)  Anion Gap: 8 (10-31-24)  eGFR: 38 (10-31-24)  Calcium: 8.6 (10-31-24)  Phosphorus: -- (10-31-24)  Magnesium: -- (10-31-24)  LFT - Alb/Tprot/Tbili/Dbili/AlkPhos/ALT/AST: 3.0/--/0.2/--/95/47/109 (10-31-24)      CBC - WBC/HGB/HTC/PLT: 8.48/9.0/27.6/176 (10-31-24)BMP: Na/K/Cl/Bicarb/BUN/Cr/Gluc: 134/3.7/100/26/26/1.42/344 (10-31-24)  Anion Gap: 8 (10-31-24)  eGFR: 38 (10-31-24)  Calcium: 8.6 (10-31-24)  Phosphorus: -- (10-31-24)  Magnesium: -- (10-31-24)    CAPILLARY BLOOD GLUCOSE & INSULIN RECEIVED  >600 mg/dL (10-30 @ 09:31) Insulin 5  >600 mg/dL (10-30 @ 10:54)  >600 mg/dL (10-30 @ 11:11)  >600 mg/dL (10-30 @ 11:27)  >600 mg/dL (10-30 @ 11:42)  >600 mg/dL (10-30 @ 11:59)  >600 mg/dL (10-30 @ 13:04)  530 mg/dL (10-30 @ 14:06) Insulin 6   410 mg/dL (10-30 @ 14:58) Insulin 3  315 mg/dL (10-30 @ 15:57) Insulin 1.1  250 mg/dL (10-30 @ 17:00)  222 mg/dL (10-30 @ 18:06)  226 mg/dL (10-30 @ 19:22)  240 mg/dL (10-30 @ 19:56)  183 mg/dL (10-30 @ 21:03)  176 mg/dL (10-30 @ 22:03) Insulin 1.1   137 mg/dL (10-30 @ 23:05) Lantus 15   190 mg/dL (10-30 @ 23:58)  179 mg/dL (10-31 @ 00:59) Stopped D5 200cc/hr and insulin gtt   135 mg/dL (10-31 @ 02:00)  98 mg/dL (10-31 @ 03:05)  70 mg/dL (10-31 @ 03:57) 1/2 amp  96 mg/dL (10-31 @ 04:56)  66 mg/dL (10-31 @ 06:33) 1/2 amp   162 mg/dL (10-31 @ 07:28) Did not eat breakfast   108 mg/dL (10-31 @ 08:59)  75 mg/dL (10-31 @ 11:06)  104 mg/dL (10-31 @ 11:24) D5@ 30cc/hr started. Eating lunch 2-3 bites of mashed potato, not drinking water. 1:1 feeds     10-30-24 @ 07:01  -  10-31-24 @ 07:00  --------------------------------------------------------  IN: 2828.7 mL / OUT: 1375 mL / NET: 1453.7 mL    10-31-24 @ 07:01  -  10-31-24 @ 15:01  --------------------------------------------------------  IN: 0 mL / OUT: 150 mL / NET: -150 mL    Imaging:  CT Chest Abdomen/ Pelvis: 10/28  COMPARISON: 1/2/2024 other studies dating back to 3/28/2020    CONTRAST/COMPLICATIONS:  IV Contrast: Isovue 370  90 cc administered   10 cc discarded  Oral Contrast: Water  Complications: None reported at time of study completion    PROCEDURE:  CT of the Chest, Abdomen and Pelvis was performed.  Dual phase, arterial and portal venous phase imaging was performed   through the upper abdomen.  Sagittal and coronal reformats were performed.    FINDINGS:  CHEST:  LUNGS AND LARGE AIRWAYS: Patent central airways. Nodular/tubular   branching density is again seen in the right lobe likely due to areas of   bronchial impaction. Small calcified granuloma lingula segment of left   upper lobe. No suspicious pulmonary nodules.  PLEURA: No pleuraleffusion.  VESSELS: No thoracic aortic aneurysm. No pulmonary emboli.  HEART: Heart size is normal. No pericardial effusion.  MEDIASTINUM AND JESS: No lymphadenopathy.  CHEST WALL AND LOWER NECK: Within normal limits.    ABDOMEN AND PELVIS:  LIVER: Stable 1.1 cm hypodense lesion posterior segment right hepatic   lobe, likely hemangioma.  BILE DUCTS: Pneumobilia. No biliary dilatation.  GALLBLADDER: Cholecystectomy.  SPLEEN: A few small calcified granulomas.  PANCREAS: Status post partial pancreatectomy and Whipple procedure. Small   amount of gas within the pancreatic duct which is not abnormally   distended.  ADRENALS: Within normal limits.  KIDNEYS/URETERS: Within normal limits.    BLADDER: Within normal limits.  REPRODUCTIVE ORGANS: Hysterectomy.    BOWEL: Postoperative changes consistent with Whipple procedure. No bowel   obstruction. Appendix is normal.  PERITONEUM/RETROPERITONEUM: Within normal limits.  VESSELS: Abdominal aortic aneurysm. Patent portal vein.  LYMPH NODES: No lymphadenopathy.  ABDOMINAL WALL: Within normal limits.  BONES: Chronic compression of L2 vertebral body. ORIF of right clavicle.   Old rib fractures. Old left inferior pubic ramus fracture. Degenerative   changes.    IMPRESSION:  Status post Whipple procedure. No evidence of local recurrence or distant   metastasis.    --- End of Report ---      ASSESSMENT / RECOMMENDATIONS  IVETTE ROSE is a 78yo F resident in [facility], w/ PMHx of dementia, osteoporosis (on Prolia), anxiety and depression, T1DM, HTN and history of locally advanced HARSHA pancreatic cancer (T2N2) diagnosed 2020 and now status-post Whipple and adjuvant chemotherapy - presenting to ED w/ elevated glucose levels, lethargy/disorientaion & generalized weakness. Found to be in DKA now resolved.     Last a1c outpatient 9/16: 7.3   Creatinine: 1.42, GFR: 38  Weight: 47.6 kg     #Type 1 diabetes mellitus with hyperglycemia  #Pancreatic cancer S/p Whipple + chemotherapy without recurrence    - Less likely that patient's DKA was triggered by E-coli UTI as patient has been having hyperglycemia 300-500 for the past month per [facility] chart notes   - Furthermore patient was treated appropriately with bactrim, had no fever or wbc on admission and UA was negative as well.   - Suspect missed doses of insulin at [facility] in addition to some dietary non-compliance/ dehydration.    - Currently she needs 1:1 feeds, only eating a few bites of each meal and does not ask for food yet or endorse hunger  - Decrease lantus to 10 units qhs   - c/w D5NS or D5LR at 50cc/hr while she is not eating consistantly  - C/w Low insulin sliding scale for now before meals and bedtime  - F/u a1c   - Patient's fingerstick glucose goal is 100-180 mg/dL.    - Discharge recommendations to be discussed.   - Patient can follow up at discharge with [facility] by calling [phone] to make an appointment.      Case discussed with Dr. Guallpa. Primary team updated.       Lakeshia Aleman   Endocrinology Fellow    Service Pager: [phone]

## 2024-10-31 NOTE — PROGRESS NOTE ADULT - ASSESSMENT
78yo F resident in [place], w/ PMHx of dementia, osteoporosis (on Prolia), anxiety and depression, T1DM, HTN and history of locally advanced HARSHA pancreatic cancer (T2N2) diagnosed 2020 and now status-post Whipple and adjuvant chemotherapy - presenting to ED w/ elevated glucose levels, lethargy/disorientation & generalized weakness - found with DKA, requiring admission to ICU, now stable for RMF.

## 2024-10-31 NOTE — PROGRESS NOTE ADULT - ASSESSMENT
80yo F resident in [facility], w/ PMHx of dementia, osteoporosis (on Prolia), anxiety and depression, T1DM, HTN and history of locally advanced HARSHA pancreatic cancer (T2N2) diagnosed 2020 and now status-post Whipple and adjuvant chemotherapy - presenting to ED w/ elevated glucose levels, lethargy/disorientaion & generalized weakness - found with DKA. ICU Consulted for DKA requiring insulin gtt.     CARDIOVASCULAR  #HTN  Hx HTN, home meds amlodipine 2.5mg qd, Losartan 25mg qd   - normotensive now, restart amlodipine when tolerating PO   - hold Losartan iso PERCY, restart as tolerated    PULMONARY   LACHELLE    GASTROINTESTINAL   LACHELLE     RENAL  #PERCY likely on CKD   Unknown baseline. P/w BUN/Cr 53/2.58. Likely element of pre-renal as pt very dry on exam, iso DKA. Suspect component of CKD   - c/w IV Fluids as above   - F/u urine studies  - Obtain collateral  - Consider RP US     ENDOCRINE    #DKA  P/w lethargy, confusion, nausea (no vomiting) gluc >1000, Bicarb 4 w/ AG 32, BHB >8. K 6.3 and Phos 8.7, indicating DKA. No hx of prior DKA admissions per cousin.   S/p 2g CaGluc, 2L NS, & started on insulin gtt in ED.   - Admit to MICU for DKA protocol  - Insulin 5u bolus (0.1/kg) x1 now, c/ insulin gtt per protocol for now  - Gave 3amp bicarb for pH 7.0 on VBG  - Repeat BMP, Mag. Phos, VBG, lactate, BHB q4h (next ordered 1PM)   - Started /hr for now  - When K<5, add KCl additive to IV fluids     #DM1  Hx Type 1 DM, on basal bolus insulin however doses unclear  Per [facility], patient on tresiba (insulin degludec) 10 units subQ every morning  Also on insulin lispro sliding scale and 7u subQ before meals    INFECTIOUS DISEASE  #Recent UTI  Pt reportedly w/ recent UTI (UA performed at [facility]) tx'd w/ Bactirm. Unclear if pt had sx, as unable to endorse (AOx0-1 at baseline per cousin) however was c/o genital pruritus. Pt had urinary frequency however iso polydipsia likely 2/2 DKA. SIRS Criteria: Tachypnea & tachycardic, however vital signs may be iso DKA  - f/u UA with reflex culture  - If positive, can start CTX 1g q24h for coverage    PSYCH  #Anxiety/Depression  - Restart home escitalopram 5mg qd once tolerating PO    Prophylactic Measures:  Fluids: S/p 2L NS, on /hr  Electrolytes: Replete aggressively   Nutrition: NPO for now  PPx: Hep subcutaneous q12h    Dispo: MICU   80yo F resident in [facility], w/ PMHx of dementia, osteoporosis (on Prolia), anxiety and depression, T1DM, HTN and history of locally advanced HARSHA pancreatic cancer (T2N2) diagnosed 2020 and now status-post Whipple and adjuvant chemotherapy - presenting to ED w/ elevated glucose levels, lethargy/disorientation & generalized weakness - found with DKA. ICU Consulted for DKA requiring insulin gtt.     NEURO  #Dementia  On home rivastigmine patch  - restart rivastigmine patch  - start delirium precautions    CARDIOVASCULAR  #HTN  Hx HTN, home meds amlodipine 2.5mg qd, Losartan 25mg qd   - continue home meds    PULMONARY   LACHELLE    GASTROINTESTINAL   LACHELLE     RENAL  #PERCY likely on CKD   Unknown baseline. P/w BUN/Cr 53/2.58. Likely element of pre-renal as pt very dry on exam, iso DKA. Suspect component of CKD   s/p IV Fluids as above   - F/u urine studies  - Obtain collateral  - Consider RP US     ENDOCRINE    #DKA  P/w lethargy, confusion, nausea (no vomiting) gluc >1000, Bicarb 4 w/ AG 32, BHB >8. K 6.3 and Phos 8.7, indicating DKA. No hx of prior DKA admissions per cousin.   S/p 2g CaGluc, 2L NS, & started on insulin gtt in ED.   DKA Protocol  - Insulin 5u bolus (0.1/kg) x1, s/p 3amp bicarb for pH 7.0 on VBG, s/p LR 200cc/hr  Overnight, sugars acutely dropped, patient bridged on insulin 15u basal and 5u pre-meal, started on D5 drip at 30ml/hr  b-hydroxybutyrate levels wnl at 0.1, AG closed x2  - stop pre-meal insulin and decrease lantus to 10u  - d/c'ed D5 drip  - endocrine consulted, follow recs  - q4h finger sticks  - trend labs    #DM1  Hx Type 1 DM, on basal bolus insulin however doses unclear  Per [facility], patient on tresiba (insulin degludec) 10 units subQ every morning  Also on insulin lispro sliding scale and 7u subQ before meals  - f/u endocrine recs    INFECTIOUS DISEASE  #Recent UTI  Pt reportedly w/ recent UTI (UA performed at [facility]) tx'd w/ Bactirm. Unclear if pt had sx, as unable to endorse (AOx0-1 at baseline per cousin) however was c/o genital pruritus. Pt had urinary frequency however iso polydipsia likely 2/2 DKA. SIRS Criteria: Tachypnea & tachycardic, however vital signs may be iso DKA  UA negative on 10/30    PSYCH  #Anxiety/Depression  - Restart home escitalopram 5mg qd     Prophylactic Measures:  Fluids: S/p 2L NS, on /hr  Electrolytes: Replete as needed  Nutrition: NPO for now  PPx: Hep subcutaneous q12h    Dispo: RMF   80yo F resident in [place], w/ PMHx of dementia, osteoporosis (on Prolia), anxiety and depression, T1DM, HTN and history of locally advanced HARSHA pancreatic cancer (T2N2) diagnosed 2020 and now status-post Whipple and adjuvant chemotherapy - presenting to ED w/ elevated glucose levels, lethargy/disorientation & generalized weakness - found with DKA. ICU Consulted for DKA requiring insulin gtt.     NEURO  #Dementia  On home rivastigmine patch  - restart rivastigmine patch  - start delirium precautions    CARDIOVASCULAR  #HTN  Hx HTN, home meds amlodipine 2.5mg qd, Losartan 25mg qd   - continue home meds    PULMONARY   LACHELLE    GASTROINTESTINAL   LACHELLE     RENAL  #PERCY likely on CKD   Unknown baseline. P/w BUN/Cr 53/2.58. Likely element of pre-renal as pt very dry on exam, iso DKA. Suspect component of CKD   s/p IV Fluids as above   - F/u urine studies  - Obtain collateral  - Consider RP US     ENDOCRINE    #DKA  P/w lethargy, confusion, nausea (no vomiting) gluc >1000, Bicarb 4 w/ AG 32, BHB >8. K 6.3 and Phos 8.7, indicating DKA. No hx of prior DKA admissions per cousin.   S/p 2g CaGluc, 2L NS, & started on insulin gtt in ED.   DKA Protocol  - Insulin 5u bolus (0.1/kg) x1, s/p 3amp bicarb for pH 7.0 on VBG, s/p LR 200cc/hr  Overnight, sugars acutely dropped, patient bridged on insulin 15u basal and 5u pre-meal, started on D5 drip at 30ml/hr  b-hydroxybutyrate levels wnl at 0.1, AG closed x2  - stop pre-meal insulin and decrease lantus to 10u  - d/c'ed D5 drip  - endocrine consulted, recommend lantus 10u and D5 solution at 50ml/hr  - q4h finger sticks  - trend labs    #DM1  Hx Type 1 DM, on basal bolus insulin however doses unclear  Per [facility], patient on tresiba (insulin degludec) 10 units subQ every morning  Also on insulin lispro sliding scale and 7u subQ before meals  - f/u endocrine recs    INFECTIOUS DISEASE  #Recent UTI  Pt reportedly w/ recent UTI (UA performed at [facility]) tx'd w/ Bactirm. Unclear if pt had sx, as unable to endorse (AOx0-1 at baseline per cousin) however was c/o genital pruritus. Pt had urinary frequency however iso polydipsia likely 2/2 DKA. SIRS Criteria: Tachypnea & tachycardic, however vital signs may be iso DKA  UA negative on 10/30    PSYCH  #Anxiety/Depression  - Restart home escitalopram 5mg qd     Prophylactic Measures:  Fluids: S/p 2L NS, on /hr  Electrolytes: Replete as needed  Nutrition: NPO for now  PPx: Hep subcutaneous q12h  Dispo: RMF

## 2024-10-31 NOTE — PROGRESS NOTE ADULT - PROBLEM SELECTOR PLAN 3
.  -patient w/ PERCY; baseline cr 0.85. Most recent lab work reviewed and improving  -of note, she underwent CT with IV contrast on 10/28  -supportive care and management per ICU team.

## 2024-10-31 NOTE — PROGRESS NOTE ADULT - PROBLEM SELECTOR PLAN 1
-secondary to DKA  -at baseline, the patient is AAOx2; moderate dementia, FAST scale 6c-d. PPS 50%  -she is at high risk for delirium during this admission. Please continue to treat TME.    - non pharm delirium precautions:  -- minimize "tethering" as much as possible -- remove unnecessary IV lines and monitor leads, review need for NGT, lincoln and physical restraints.  -- please move patient closer to window if possible  -- allow natural light to enter room during day  -- ensure proper hydration and food intake  -- have family members spend time with patient  -- keep clock visible in patient's room at all time  -- frequent reassurance.

## 2024-10-31 NOTE — PROGRESS NOTE ADULT - ASSESSMENT
79 year old woman, [place] [address] resident, with PMHx of dementia, osteoporosis (on Prolia), anxiety and depression, T1DM, HTN and history of locally advanced HARSHA pancreatic cancer (T2N2) diagnosed 2020 and now status-post Whipple and adjuvant chemotherapy ([place]) with PIEDAD. She is presenting to ED w/ elevated glucose levels, lethargy and disorientation & generalized weakness. The patient is known to this provider from [facility]. She was started on Bactrim for UTI (guided by urine culture) on the 23rd; while mental status remained at baseline, her blood sugars have been challenging to control in the setting of cognitive impairment and frequent eating/snacking at [facility] facility. Palliative medicine consulted for GOC. As per ED team, plan is for hospital admission in the setting of DKA.    GOC are to maintain function and return to previous baseline.  Code status is DNR/DNI.  Prognosis is reserved.

## 2024-10-31 NOTE — PROGRESS NOTE ADULT - PROBLEM SELECTOR PLAN 5
.  -I obtained a copy of the patient's living will and placed it in the paper chart.  -confirmed HCP -> Nirali Coronado ([phone])/ alternate HCP: Melvin Coronado ([phone])  -GOC are to maintain function and return to previous baseline.  -Code status is DNR/DNI.  -treatment preferences: no feeding tube, no hemodialysis.   -MOLST form filled and placed in paper chart.
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
## 2024-10-31 NOTE — PROGRESS NOTE ADULT - SUBJECTIVE AND OBJECTIVE BOX
[facility] and Palliative Care  Alexi Vanegas, Geriatrics & Palliative Medicine attending  Contact Info: Call [phone] (HEAL Line) or message on [employer] Teams    SUBJECTIVE AND OBJECTIVE:  The patient was seen at bedside.  She reported feeling tired.  Denies acute symptoms.     INTERVAL HPI/OVERNIGHT EVENTS: Interval events noted. See patient's PRN use for the past 24hrs noted below. Comprehensive symptom assessment and GOC exploration as noted below. Extensive time spent discussing plan of care with patient/family.  ALLERGIES:  No Known Allergies    MEDICATIONS  (STANDING):  amLODIPine   Tablet 2.5 milliGRAM(s) Oral at bedtime  chlorhexidine 2% Cloths 1 Application(s) Topical <User Schedule>  dextrose 5%. 1000 milliLiter(s) (50 mL/Hr) IV Continuous <Continuous>  dextrose 5%. 1000 milliLiter(s) (100 mL/Hr) IV Continuous <Continuous>  dextrose 5%. 1000 milliLiter(s) (50 mL/Hr) IV Continuous <Continuous>  dextrose 50% Injectable 25 Gram(s) IV Push once  escitalopram 5 milliGRAM(s) Oral daily  glucagon  Injectable 1 milliGRAM(s) IntraMuscular once  heparin   Injectable 5000 Unit(s) SubCutaneous every 12 hours  insulin glargine Injectable (LANTUS) 10 Unit(s) SubCutaneous at bedtime  insulin lispro (ADMELOG) corrective regimen sliding scale   SubCutaneous three times a day before meals  insulin lispro (ADMELOG) corrective regimen sliding scale   SubCutaneous at bedtime  losartan 25 milliGRAM(s) Oral daily  rivastigmine patch  4.6 mG/24 Hr(s) 1 Patch Transdermal every 24 hours    MEDICATIONS  (PRN):  dextrose Oral Gel 15 Gram(s) Oral once PRN Blood Glucose LESS THAN 70 milliGRAM(s)/deciliter      Analgesic Use (Scheduled and PRNs) for past 24 hours:  escitalopram   5 milliGRAM(s) Oral (10-31-24 @ 17:31)    rivastigmine patch  4.6 mG/24 Hr(s)   1 Patch Transdermal (10-31-24 @ 16:26)      ITEMS UNCHECKED ARE NOT PRESENT  PRESENT SYMPTOMS/REVIEW OF SYSTEMS: []Unable to obtain due to poor mentation   Source if other than patient:  []Family   []Team     PHYSICAL EXAM  Older woman lying on hospital bed, NAD  somnolent, able to follow simple commands, keeping eyes closed due to fatigue  no increased WOB noted  lungs are CTABL  abd is soft  moving all four extremities on command    Vital Signs Last 24 Hrs  T(C): 37.3 (31 Oct 2024 18:14), Max: 37.3 (31 Oct 2024 01:16)  T(F): 99.1 (31 Oct 2024 18:14), Max: 99.1 (31 Oct 2024 01:16)  HR: 73 (31 Oct 2024 16:00) (70 - 92)  BP: 115/59 (31 Oct 2024 16:00) (86/47 - 162/66)  BP(mean): 85 (31 Oct 2024 16:00) (64 - 100)  RR: 19 (31 Oct 2024 15:00) (15 - 26)  SpO2: 96% (31 Oct 2024 16:00) (95% - 100%)    Parameters below as of 31 Oct 2024 15:00  Patient On (Oxygen Delivery Method): room air        LABS: Personally reviewed and interpreted                        9.0    8.48  )-----------( 176      ( 31 Oct 2024 05:20 )             27.6   10-31    134[L]  |  100  |  26[H]  ----------------------------<  344[H]  3.7   |  26  |  1.42[H]    Ca    8.6      31 Oct 2024 10:51  Phos  2.5     10-31  Mg     1.9     10-31    TPro  5.0[L]  /  Alb  3.0[L]  /  TBili  0.2  /  DBili  x   /  AST  109[H]  /  ALT  47[H]  /  AlkPhos  95  10-31      RADIOLOGY & ADDITIONAL STUDIES: Personally reviewed and interpreted  None new    DISCUSSION OF CASE: Family - to provide updates and emotional support; Primary Team/RN - to discuss plan of care

## 2024-10-31 NOTE — PROGRESS NOTE ADULT - PROBLEM SELECTOR PLAN 6
Complex medical decision making / symptom management in the setting of dementia and uncontrolled DM.    Will continue to follow for ongoing GOC discussion / titration of palliative regimen. Emotional support provided, questions answered.  Active Psychosocial Referrals:  [x]Social Work/Case management []PT/OT []Chaplaincy []Hospice  []Patient/Family Support []Holistic RN []Massage Therapy []Music Therapy []Ethics  Coping: [] well [] with difficulty [] poor coping [] unable to assess  Support system: [] strong [] adequate [] inadequate    For new or uncontrolled symptoms, please call Palliative Care at [phone]). The service is available 24/7 (including nights & weekends) to provide symptom management recommendations over the phone as appropriate.

## 2024-10-31 NOTE — PROGRESS NOTE ADULT - PROBLEM SELECTOR PLAN 2
Unknown baseline. P/w BUN/Cr 53/2.58. Likely element of pre-renal as pt very dry on exam, iso DKA. Suspect component of CKD   s/p IV Fluids as above   - F/u urine studies  - Obtain collateral  - Consider RP US Unknown baseline. P/w BUN/Cr 53/2.58 on admission. Likely element of pre-renal as pt very dry on exam, iso DKA.   Creatine downtrending since receiving fluids, now 1.42  BUN downtrending since receiving fluids, now 26   -ctm Cr and BUN

## 2024-10-31 NOTE — PROGRESS NOTE ADULT - SUBJECTIVE AND OBJECTIVE BOX
CC: Patient is a 79y old  Female who presents with a chief complaint of DKA (31 Oct 2024 19:18)      INTERVAL EVENTS: FILIBERTO    SUBJECTIVE / INTERVAL HPI: Patient seen and examined at bedside.     ROS: negative unless otherwise stated above.    VITAL SIGNS:  Vital Signs Last 24 Hrs  T(C): 37.3 (31 Oct 2024 18:14), Max: 37.3 (31 Oct 2024 01:16)  T(F): 99.1 (31 Oct 2024 18:14), Max: 99.1 (31 Oct 2024 01:16)  HR: 79 (31 Oct 2024 19:00) (70 - 92)  BP: 134/76 (31 Oct 2024 19:00) (86/47 - 162/66)  BP(mean): 99 (31 Oct 2024 19:00) (64 - 100)  RR: 19 (31 Oct 2024 15:00) (15 - 26)  SpO2: 96% (31 Oct 2024 19:00) (95% - 100%)    Parameters below as of 31 Oct 2024 19:00  Patient On (Oxygen Delivery Method): room air          10-30-24 @ 07:01  -  10-31-24 @ 07:00  --------------------------------------------------------  IN: 2828.7 mL / OUT: 1375 mL / NET: 1453.7 mL    10-31-24 @ 07:01  -  10-31-24 @ 19:42  --------------------------------------------------------  IN: 300 mL / OUT: 300 mL / NET: 0 mL        PHYSICAL EXAM:    General: NAD  HEENT: MMM  Neck: supple  Cardiovascular: +S1/S2; RRR  Respiratory: CTA B/L; no W/R/R  Gastrointestinal: soft, NT/ND  Extremities: WWP; no edema, clubbing or cyanosis  Vascular: 2+ radial, DP/PT pulses B/L  Neurological: AAOx3; no focal deficits    MEDICATIONS:  MEDICATIONS  (STANDING):  amLODIPine   Tablet 2.5 milliGRAM(s) Oral at bedtime  chlorhexidine 2% Cloths 1 Application(s) Topical <User Schedule>  dextrose 5%. 1000 milliLiter(s) (50 mL/Hr) IV Continuous <Continuous>  dextrose 5%. 1000 milliLiter(s) (100 mL/Hr) IV Continuous <Continuous>  dextrose 5%. 1000 milliLiter(s) (50 mL/Hr) IV Continuous <Continuous>  dextrose 50% Injectable 25 Gram(s) IV Push once  escitalopram 5 milliGRAM(s) Oral daily  fluconAZOLE   Tablet 150 milliGRAM(s) Oral once  glucagon  Injectable 1 milliGRAM(s) IntraMuscular once  heparin   Injectable 5000 Unit(s) SubCutaneous every 12 hours  insulin glargine Injectable (LANTUS) 10 Unit(s) SubCutaneous at bedtime  insulin lispro (ADMELOG) corrective regimen sliding scale   SubCutaneous three times a day before meals  insulin lispro (ADMELOG) corrective regimen sliding scale   SubCutaneous at bedtime  losartan 25 milliGRAM(s) Oral daily  rivastigmine patch  4.6 mG/24 Hr(s) 1 Patch Transdermal every 24 hours    MEDICATIONS  (PRN):  dextrose Oral Gel 15 Gram(s) Oral once PRN Blood Glucose LESS THAN 70 milliGRAM(s)/deciliter      ALLERGIES:  Allergies    No Known Allergies    Intolerances        LABS:                        9.0    8.48  )-----------( 176      ( 31 Oct 2024 05:20 )             27.6     10-31    134[L]  |  100  |  26[H]  ----------------------------<  344[H]  3.7   |  26  |  1.42[H]    Ca    8.6      31 Oct 2024 10:51  Phos  2.5     10-31  Mg     1.9     10-31    TPro  5.0[L]  /  Alb  3.0[L]  /  TBili  0.2  /  DBili  x   /  AST  109[H]  /  ALT  47[H]  /  AlkPhos  95  10-31      Urinalysis Basic - ( 31 Oct 2024 10:51 )    Color: x / Appearance: x / SG: x / pH: x  Gluc: 344 mg/dL / Ketone: x  / Bili: x / Urobili: x   Blood: x / Protein: x / Nitrite: x   Leuk Esterase: x / RBC: x / WBC x   Sq Epi: x / Non Sq Epi: x / Bacteria: x      CAPILLARY BLOOD GLUCOSE      POCT Blood Glucose.: 162 mg/dL (31 Oct 2024 16:06)      RADIOLOGY & ADDITIONAL TESTS: Reviewed.

## 2024-10-31 NOTE — PROGRESS NOTE ADULT - NS ATTEST RISK PROBLEM GEN_ALL_CORE FT
acute illness posing risk to life  2 or more chronic illnesses with progression of disease  need to review inpatient and outpatient notes, labs and imaging  need for independent historian for collateral information  coordination of complex transitions of care between assisted living facility and acute hospital.

## 2024-10-31 NOTE — PROVIDER CONTACT NOTE (OTHER) - ACTION/TREATMENT ORDERED:
1/2 amp D50% IVP to be given. Will recheck FS 30min post administration.
MD will come to asssess at bedside; pt will get straigth cath per MD janae lincoln at this time; MD aware of current FS;

## 2024-10-31 NOTE — PROGRESS NOTE ADULT - PROBLEM SELECTOR PLAN 5
On home rivastigmine patch  - restart rivastigmine patch  - start delirium precautions On home rivastigmine patch  - rivastigmine patch  - delirium precautions

## 2024-10-31 NOTE — PROGRESS NOTE ADULT - PROBLEM SELECTOR PLAN 6
- Restart home escitalopram 5mg qd History of anxiety and depression.  -ctw home escitalopram 5mg qd

## 2024-10-31 NOTE — PROVIDER CONTACT NOTE (OTHER) - SITUATION
pt woke up agitated; attempting to get oob; attempting to pull at lines; stating she has to use bathroom
Pt FS 71

## 2024-10-31 NOTE — PROGRESS NOTE ADULT - PROBLEM SELECTOR PLAN 1
P/w lethargy, confusion, nausea (no vomiting) gluc >1000, Bicarb 4 w/ AG 32, BHB >8. K 6.3 and Phos 8.7, indicating DKA. No hx of prior DKA admissions per cousin.   S/p 2g CaGluc, 2L NS, & started on insulin gtt in ED.   DKA Protocol  - Insulin 5u bolus (0.1/kg) x1, s/p 3amp bicarb for pH 7.0 on VBG, s/p LR 200cc/hr  Overnight, sugars acutely dropped, patient bridged on insulin 15u basal and 5u pre-meal, started on D5 drip at 30ml/hr  b-hydroxybutyrate levels wnl at 0.1, AG closed x2  - stop pre-meal insulin and decrease lantus to 10u  - d/c'ed D5 drip  - endocrine consulted, follow recs  - q4h finger sticks  - trend labs RESOLVED  The patient presented with lethargy, confusion, and nausea (without vomiting). Laboratory findings showed glucose levels exceeding 1000 mg/dL, bicarbonate at 4 mEq/L, an anion gap of 32, and ß-hydroxybutyrate greater than 8. Potassium was 6.3 mEq/L, and phosphorus was 8.7 mg/dL, indicating diabetic ketoacidosis (DKA). A family member reported no prior history of DKA.     The patient received 2 g of calcium gluconate, 2 L of normal saline (NS), and an insulin drip was initiated in the emergency department. Treatment continued with the DKA protocol in the MICU, leading to normalization of glucose levels, ß-hydroxybutyrate levels returning to normal at 0.1, and the anion gap closing twice.

## 2024-10-31 NOTE — PROGRESS NOTE ADULT - PROBLEM SELECTOR PLAN 3
Hx Type 1 DM, on basal bolus insulin however doses unclear  Per [facility], patient on tresiba (insulin degludec) 10 units subQ every morning  Also on insulin lispro sliding scale and 7u subQ before meals  - f/u endocrine recs Hx Type 1 DM, on basal bolus insulin however doses unclear  [facility], patient on tresiba (insulin degludec) 10 units subQ every morning. Also on insulin lispro sliding scale and 7u subQ before meals    Patient has episodes of hypoglycemia post DKA requiring  amp of D50. Most likely in the setting of poor oral intake. Family members report that patient is only eating a few bites of each meal and does not ask for food. Patient also endorses lack of appetite.     Endocrinology following, their reccs as of today (10/31) are as follows    -Lantus 10 units qd  -  D5NS at 50cc/hr as patient is not eating consistently  - Low insulin sliding scale for now before meals and bedtime  - F/u a1c   - Patient's fingerstick glucose goal is 100-180 mg/dL.    - Discharge recommendations will be provided

## 2024-11-01 ENCOUNTER — TRANSCRIPTION ENCOUNTER (OUTPATIENT)
Age: 79
End: 2024-11-01

## 2024-11-01 DIAGNOSIS — E10.9 TYPE 1 DIABETES MELLITUS WITHOUT COMPLICATIONS: ICD-10-CM

## 2024-11-01 LAB
A1C WITH ESTIMATED AVERAGE GLUCOSE RESULT: 7.4 % — HIGH (ref 4–5.6)
ALBUMIN SERPL ELPH-MCNC: 2.8 G/DL — LOW (ref 3.3–5)
ALP SERPL-CCNC: 107 U/L — SIGNIFICANT CHANGE UP (ref 40–120)
ALT FLD-CCNC: 40 U/L — SIGNIFICANT CHANGE UP (ref 10–45)
ANION GAP SERPL CALC-SCNC: 8 MMOL/L — SIGNIFICANT CHANGE UP (ref 5–17)
AST SERPL-CCNC: 74 U/L — HIGH (ref 10–40)
BASOPHILS # BLD AUTO: 0.03 K/UL — SIGNIFICANT CHANGE UP (ref 0–0.2)
BASOPHILS NFR BLD AUTO: 0.6 % — SIGNIFICANT CHANGE UP (ref 0–2)
BILIRUB SERPL-MCNC: 0.3 MG/DL — SIGNIFICANT CHANGE UP (ref 0.2–1.2)
BUN SERPL-MCNC: 20 MG/DL — SIGNIFICANT CHANGE UP (ref 7–23)
C PEPTIDE SERPL-MCNC: <0.1 NG/ML — LOW (ref 1.1–4.4)
CALCIUM SERPL-MCNC: 8.4 MG/DL — SIGNIFICANT CHANGE UP (ref 8.4–10.5)
CHLORIDE SERPL-SCNC: 105 MMOL/L — SIGNIFICANT CHANGE UP (ref 96–108)
CO2 SERPL-SCNC: 27 MMOL/L — SIGNIFICANT CHANGE UP (ref 22–31)
CREAT SERPL-MCNC: 1.09 MG/DL — SIGNIFICANT CHANGE UP (ref 0.5–1.3)
EGFR: 52 ML/MIN/1.73M2 — LOW
EOSINOPHIL # BLD AUTO: 0.1 K/UL — SIGNIFICANT CHANGE UP (ref 0–0.5)
EOSINOPHIL NFR BLD AUTO: 1.9 % — SIGNIFICANT CHANGE UP (ref 0–6)
ESTIMATED AVERAGE GLUCOSE: 166 MG/DL — HIGH (ref 68–114)
GLUCOSE BLDC GLUCOMTR-MCNC: 101 MG/DL — HIGH (ref 70–99)
GLUCOSE BLDC GLUCOMTR-MCNC: 117 MG/DL — HIGH (ref 70–99)
GLUCOSE BLDC GLUCOMTR-MCNC: 169 MG/DL — HIGH (ref 70–99)
GLUCOSE BLDC GLUCOMTR-MCNC: 179 MG/DL — HIGH (ref 70–99)
GLUCOSE BLDC GLUCOMTR-MCNC: 323 MG/DL — HIGH (ref 70–99)
GLUCOSE BLDC GLUCOMTR-MCNC: 97 MG/DL — SIGNIFICANT CHANGE UP (ref 70–99)
GLUCOSE SERPL-MCNC: 97 MG/DL — SIGNIFICANT CHANGE UP (ref 70–99)
HCT VFR BLD CALC: 31.3 % — LOW (ref 34.5–45)
HGB BLD-MCNC: 9.4 G/DL — LOW (ref 11.5–15.5)
IMM GRANULOCYTES NFR BLD AUTO: 0.2 % — SIGNIFICANT CHANGE UP (ref 0–0.9)
LYMPHOCYTES # BLD AUTO: 0.86 K/UL — LOW (ref 1–3.3)
LYMPHOCYTES # BLD AUTO: 16.4 % — SIGNIFICANT CHANGE UP (ref 13–44)
MAGNESIUM SERPL-MCNC: 1.8 MG/DL — SIGNIFICANT CHANGE UP (ref 1.6–2.6)
MCHC RBC-ENTMCNC: 27.6 PG — SIGNIFICANT CHANGE UP (ref 27–34)
MCHC RBC-ENTMCNC: 30 G/DL — LOW (ref 32–36)
MCV RBC AUTO: 92.1 FL — SIGNIFICANT CHANGE UP (ref 80–100)
MONOCYTES # BLD AUTO: 0.41 K/UL — SIGNIFICANT CHANGE UP (ref 0–0.9)
MONOCYTES NFR BLD AUTO: 7.8 % — SIGNIFICANT CHANGE UP (ref 2–14)
NEUTROPHILS # BLD AUTO: 3.84 K/UL — SIGNIFICANT CHANGE UP (ref 1.8–7.4)
NEUTROPHILS NFR BLD AUTO: 73.1 % — SIGNIFICANT CHANGE UP (ref 43–77)
NRBC # BLD: 0 /100 WBCS — SIGNIFICANT CHANGE UP (ref 0–0)
PHOSPHATE SERPL-MCNC: 2.5 MG/DL — SIGNIFICANT CHANGE UP (ref 2.5–4.5)
PLATELET # BLD AUTO: 171 K/UL — SIGNIFICANT CHANGE UP (ref 150–400)
POTASSIUM SERPL-MCNC: 3.6 MMOL/L — SIGNIFICANT CHANGE UP (ref 3.5–5.3)
POTASSIUM SERPL-SCNC: 3.6 MMOL/L — SIGNIFICANT CHANGE UP (ref 3.5–5.3)
PROT SERPL-MCNC: 5.2 G/DL — LOW (ref 6–8.3)
RBC # BLD: 3.4 M/UL — LOW (ref 3.8–5.2)
RBC # FLD: 15.7 % — HIGH (ref 10.3–14.5)
SODIUM SERPL-SCNC: 140 MMOL/L — SIGNIFICANT CHANGE UP (ref 135–145)
WBC # BLD: 5.25 K/UL — SIGNIFICANT CHANGE UP (ref 3.8–10.5)
WBC # FLD AUTO: 5.25 K/UL — SIGNIFICANT CHANGE UP (ref 3.8–10.5)

## 2024-11-01 RX ORDER — MAGNESIUM SULFATE IN 0.9% NACL 2 G/50 ML
1 INTRAVENOUS SOLUTION, PIGGYBACK (ML) INTRAVENOUS ONCE
Refills: 0 | Status: DISCONTINUED | OUTPATIENT
Start: 2024-11-01 | End: 2024-11-04

## 2024-11-01 RX ORDER — INSULIN LISPRO 100/ML
3 VIAL (ML) SUBCUTANEOUS
Refills: 0 | Status: DISCONTINUED | OUTPATIENT
Start: 2024-11-01 | End: 2024-11-03

## 2024-11-01 RX ORDER — INSULIN GLARGINE,HUM.REC.ANLOG 100/ML
8 VIAL (ML) SUBCUTANEOUS AT BEDTIME
Refills: 0 | Status: DISCONTINUED | OUTPATIENT
Start: 2024-11-01 | End: 2024-11-02

## 2024-11-01 RX ADMIN — RIVASTIGMINE TARTRATE 1 PATCH: 4.5 CAPSULE ORAL at 07:00

## 2024-11-01 RX ADMIN — Medication 4: at 13:31

## 2024-11-01 RX ADMIN — HEPARIN SODIUM 5000 UNIT(S): 10000 INJECTION INTRAVENOUS; SUBCUTANEOUS at 06:17

## 2024-11-01 RX ADMIN — Medication 2.5 MILLIGRAM(S): at 21:56

## 2024-11-01 RX ADMIN — HEPARIN SODIUM 5000 UNIT(S): 10000 INJECTION INTRAVENOUS; SUBCUTANEOUS at 19:49

## 2024-11-01 RX ADMIN — RIVASTIGMINE TARTRATE 1 PATCH: 4.5 CAPSULE ORAL at 19:17

## 2024-11-01 RX ADMIN — ESCITALOPRAM 5 MILLIGRAM(S): 10 TABLET, FILM COATED ORAL at 13:32

## 2024-11-01 RX ADMIN — CHLORHEXIDINE GLUCONATE 1 APPLICATION(S): 40 SOLUTION TOPICAL at 06:18

## 2024-11-01 RX ADMIN — Medication 8 UNIT(S): at 22:30

## 2024-11-01 RX ADMIN — RIVASTIGMINE TARTRATE 1 PATCH: 4.5 CAPSULE ORAL at 19:00

## 2024-11-01 RX ADMIN — Medication 1: at 18:29

## 2024-11-01 RX ADMIN — LOSARTAN POTASSIUM 25 MILLIGRAM(S): 25 TABLET ORAL at 06:15

## 2024-11-01 NOTE — PROGRESS NOTE ADULT - SUBJECTIVE AND OBJECTIVE BOX
SUBJECTIVE / INTERVAL HPI: Patient was seen and examined this morning.     Overnight events: Hypoglycemiz 71 around bedtime. D5NS increased to 70cc/hr     CAPILLARY BLOOD GLUCOSE & INSULIN RECEIVED  135 mg/dL (10-31 @ 02:00)  98 mg/dL (10-31 @ 03:05)  70 mg/dL (10-31 @ 03:57)  96 mg/dL (10-31 @ 04:56)  66 mg/dL (10-31 @ 06:33)  162 mg/dL (10-31 @ 07:28)  108 mg/dL (10-31 @ 08:59)  75 mg/dL (10-31 @ 11:06) D5 at 30cc/hr   104 mg/dL (10-31 @ 11:24)  162 mg/dL (10-31 @ 16:06) + 1, D5NS at 50cc/hr   71 mg/dL (10-31 @ 21:49) Juice, D5NS at 70 cc/hr   137 mg/dL (10-31 @ 23:34) lantus 10   117 mg/dL (11-01 @ 03:04)  97 mg/dL (11-01 @ 07:18)   101 mg/dL (11-01 @ 09:36)  323 mg/dL (11-01 @ 12:56)       REVIEW OF SYSTEMS  Constitutional:  Negative fever, chills   Cardiovascular:  Negative for chest pain or palpitations.  Respiratory:  Negative for cough, wheezing, or shortness of breath.    Gastrointestinal:  Negative for nausea, vomiting, diarrhea, constipation, or abdominal pain.  Genitourinary:  Negative frequency, urgency or dysuria.  Neurologic:  No headache, confusion, dizziness, lightheadedness.    PHYSICAL EXAM  Vital Signs Last 24 Hrs  T(C): 36.7 (01 Nov 2024 05:48), Max: 37.3 (31 Oct 2024 14:01)  T(F): 98 (01 Nov 2024 05:48), Max: 99.1 (31 Oct 2024 14:01)  HR: 80 (01 Nov 2024 05:48) (73 - 82)  BP: 137/66 (01 Nov 2024 05:48) (115/59 - 162/66)  BP(mean): 90 (01 Nov 2024 05:48) (85 - 100)  RR: 18 (01 Nov 2024 05:48) (16 - 19)  SpO2: 98% (01 Nov 2024 05:48) (95% - 98%)    Parameters below as of 01 Nov 2024 05:48  Patient On (Oxygen Delivery Method): room air        Constitutional: Awake, alert, in no acute distress.   HEENT: Normocephalic, atraumatic, BHAVANI.  Respiratory: Lungs clear to ausculation bilaterally.   Cardiovascular: regular rhythm, normal S1 and S2, no audible murmurs.   GI: soft, non-tender, non-distended, bowel sounds present.  Extremities: No lower extremity edema.     LABS  CBC - WBC/HGB/HTC/PLT: 5.25/9.4/31.3/171 (11-01-24)  BMP - Na/K/Cl/Bicarb/BUN/Cr/Gluc/AG/eGFR: 140/3.6/105/27/20/1.09/97/8/52 (11-01-24)  Ca - 8.4 (11-01-24)  Phos - 2.5 (11-01-24)  Mg - 1.8 (11-01-24)  LFT - Alb/Tprot/Tbili/Dbili/AlkPhos/ALT/AST: 2.8/--/0.3/--/107/40/74 (11-01-24)          10-31-24 @ 07:01  -  11-01-24 @ 07:00  --------------------------------------------------------  IN: 300 mL / OUT: 1000 mL / NET: -700 mL        MEDICATIONS  MEDICATIONS  (STANDING):  amLODIPine   Tablet 2.5 milliGRAM(s) Oral at bedtime  chlorhexidine 2% Cloths 1 Application(s) Topical <User Schedule>  dextrose 5% + sodium chloride 0.9%. 1000 milliLiter(s) (70 mL/Hr) IV Continuous <Continuous>  dextrose 5%. 1000 milliLiter(s) (100 mL/Hr) IV Continuous <Continuous>  dextrose 5%. 1000 milliLiter(s) (50 mL/Hr) IV Continuous <Continuous>  dextrose 50% Injectable 25 Gram(s) IV Push once  escitalopram 5 milliGRAM(s) Oral daily  estrogens  Cream 0.5 Gram(s) Vaginal once  glucagon  Injectable 1 milliGRAM(s) IntraMuscular once  heparin   Injectable 5000 Unit(s) SubCutaneous every 12 hours  insulin glargine Injectable (LANTUS) 10 Unit(s) SubCutaneous at bedtime  insulin lispro (ADMELOG) corrective regimen sliding scale   SubCutaneous three times a day before meals  insulin lispro (ADMELOG) corrective regimen sliding scale   SubCutaneous at bedtime  losartan 25 milliGRAM(s) Oral daily  magnesium sulfate  IVPB 1 Gram(s) IV Intermittent once  rivastigmine patch  4.6 mG/24 Hr(s) 1 Patch Transdermal every 24 hours    MEDICATIONS  (PRN):  dextrose Oral Gel 15 Gram(s) Oral once PRN Blood Glucose LESS THAN 70 milliGRAM(s)/deciliter    ASSESSMENT / RECOMMENDATIONS  IVETTE STEELEKMAN is a 80yo F resident in [facility], w/ PMHx of dementia, osteoporosis (on Prolia), anxiety and depression, T1DM, HTN and history of locally advanced HARSHA pancreatic cancer (T2N2) diagnosed 2020 and now status-post Whipple and adjuvant chemotherapy - presenting to ED w/ elevated glucose levels, lethargy/disorientaion & generalized weakness. Found to be in DKA now resolved. Complicated by episodes of Hypoglycemia.     Last a1c outpatient 9/16: 7.3, now 7.4   Creatinine: 1.42, GFR: 38  Weight: 47.6 kg     #Type 1 diabetes mellitus with hyperglycemia  #Pancreatic cancer S/p Whipple + chemotherapy without recurrence    - Less likely that patient's DKA was triggered by E-coli UTI as patient has been having hyperglycemia 300-500 for the past month per [facility] chart notes   - Furthermore patient was treated appropriately with bactrim, had no fever or wbc on admission and UA was negative as well.   - Suspect missed doses of insulin at [facility] in addition to some dietary non-compliance/ dehydration.    - Currently she needs 1:1 feeds, only eating a few bites of each meal and does not ask for food yet or endorse hunger  - Decrease lantus to __ units qhs   - still on D5NS at 70cc/hr?   - C/w Low insulin sliding scale for now before meals and bedtime  - Patient's fingerstick glucose goal is 100-180 mg/dL.    - Discharge recommendations to be discussed.   - Patient can follow up at discharge with [facility] by calling [phone] to make an appointment.      Case discussed with Dr. Guallpa. Primary team updated.       Lakeshia Aleman   Endocrinology Fellow    Service Pager: [phone]        SUBJECTIVE / INTERVAL HPI: Patient was seen and examined this morning. Appetite better. Ate 50% of breakfast (smoothie, oatmeal, english muffin). Stopped dextrose around noon. Lunch: Rice/ salmon.     Overnight events: Hypoglycemia 71 around bedtime. D5NS increased to 70cc/hr     CAPILLARY BLOOD GLUCOSE & INSULIN RECEIVED  135 mg/dL (10-31 @ 02:00)  98 mg/dL (10-31 @ 03:05)  70 mg/dL (10-31 @ 03:57)  96 mg/dL (10-31 @ 04:56)  66 mg/dL (10-31 @ 06:33)  162 mg/dL (10-31 @ 07:28)  108 mg/dL (10-31 @ 08:59)  75 mg/dL (10-31 @ 11:06) D5 at 30cc/hr   104 mg/dL (10-31 @ 11:24)  162 mg/dL (10-31 @ 16:06) + 1, D5NS at 50cc/hr   71 mg/dL (10-31 @ 21:49) Juice, D5NS at 70 cc/hr   137 mg/dL (10-31 @ 23:34) lantus 10   117 mg/dL (11-01 @ 03:04)  97 mg/dL (11-01 @ 07:18)   101 mg/dL (11-01 @ 09:36)  323 mg/dL (11-01 @ 12:56)       REVIEW OF SYSTEMS  Constitutional:  Negative fever, chills   Cardiovascular:  Negative for chest pain or palpitations.  Respiratory:  Negative for cough, wheezing, or shortness of breath.    Gastrointestinal:  Negative for nausea, vomiting, diarrhea, constipation, or abdominal pain.  Genitourinary:  Negative frequency, urgency or dysuria.  Neurologic:  No headache, confusion, dizziness, lightheadedness.    PHYSICAL EXAM  Vital Signs Last 24 Hrs  T(C): 36.7 (01 Nov 2024 05:48), Max: 37.3 (31 Oct 2024 14:01)  T(F): 98 (01 Nov 2024 05:48), Max: 99.1 (31 Oct 2024 14:01)  HR: 80 (01 Nov 2024 05:48) (73 - 82)  BP: 137/66 (01 Nov 2024 05:48) (115/59 - 162/66)  BP(mean): 90 (01 Nov 2024 05:48) (85 - 100)  RR: 18 (01 Nov 2024 05:48) (16 - 19)  SpO2: 98% (01 Nov 2024 05:48) (95% - 98%)    Parameters below as of 01 Nov 2024 05:48  Patient On (Oxygen Delivery Method): room air      Constitutional: Awake, alert, in no acute distress. AOx2   HEENT: Normocephalic, atraumatic,  Respiratory: Lungs clear to ausculation bilaterally.   Cardiovascular: regular rhythm, normal S1 and S2, no audible murmurs.   GI: soft, non-tender, non-distended, bowel sounds present.  Extremities: No lower extremity edema.     LABS  CBC - WBC/HGB/HTC/PLT: 5.25/9.4/31.3/171 (11-01-24)  BMP - Na/K/Cl/Bicarb/BUN/Cr/Gluc/AG/eGFR: 140/3.6/105/27/20/1.09/97/8/52 (11-01-24)  Ca - 8.4 (11-01-24)  Phos - 2.5 (11-01-24)  Mg - 1.8 (11-01-24)  LFT - Alb/Tprot/Tbili/Dbili/AlkPhos/ALT/AST: 2.8/--/0.3/--/107/40/74 (11-01-24)      10-31-24 @ 07:01  -  11-01-24 @ 07:00  --------------------------------------------------------  IN: 300 mL / OUT: 1000 mL / NET: -700 mL      MEDICATIONS  MEDICATIONS  (STANDING):  amLODIPine   Tablet 2.5 milliGRAM(s) Oral at bedtime  chlorhexidine 2% Cloths 1 Application(s) Topical <User Schedule>  dextrose 5% + sodium chloride 0.9%. 1000 milliLiter(s) (70 mL/Hr) IV Continuous <Continuous>  dextrose 5%. 1000 milliLiter(s) (100 mL/Hr) IV Continuous <Continuous>  dextrose 5%. 1000 milliLiter(s) (50 mL/Hr) IV Continuous <Continuous>  dextrose 50% Injectable 25 Gram(s) IV Push once  escitalopram 5 milliGRAM(s) Oral daily  estrogens  Cream 0.5 Gram(s) Vaginal once  glucagon  Injectable 1 milliGRAM(s) IntraMuscular once  heparin   Injectable 5000 Unit(s) SubCutaneous every 12 hours  insulin glargine Injectable (LANTUS) 10 Unit(s) SubCutaneous at bedtime  insulin lispro (ADMELOG) corrective regimen sliding scale   SubCutaneous three times a day before meals  insulin lispro (ADMELOG) corrective regimen sliding scale   SubCutaneous at bedtime  losartan 25 milliGRAM(s) Oral daily  magnesium sulfate  IVPB 1 Gram(s) IV Intermittent once  rivastigmine patch  4.6 mG/24 Hr(s) 1 Patch Transdermal every 24 hours    MEDICATIONS  (PRN):  dextrose Oral Gel 15 Gram(s) Oral once PRN Blood Glucose LESS THAN 70 milliGRAM(s)/deciliter    ASSESSMENT / RECOMMENDATIONS  IVETTE ORSE is a 78yo F resident in [facility], w/ PMHx of dementia, osteoporosis (on Prolia), anxiety and depression, T1DM, HTN and history of locally advanced HARSHA pancreatic cancer (T2N2) diagnosed 2020 and now status-post Whipple and adjuvant chemotherapy - presenting to ED w/ elevated glucose levels, lethargy/disorientaion & generalized weakness. Found to be in DKA now resolved. Complicated by episodes of Hypoglycemia.     Last a1c outpatient 9/16: 7.3, now 7.4   Creatinine: 1.42, GFR: 38  Weight: 47.6 kg     #Type 1 diabetes mellitus with hyperglycemia  #Pancreatic cancer S/p Whipple + chemotherapy without recurrence    - appetite improving, more alert today but still needs 1:1 feed  - Decrease lantus to 8 units qhs   - Start 3 units lispro before meals  - C/w Low insulin sliding scale for now before meals and bedtime  - Patient's fingerstick glucose goal is 100-180 mg/dL.    - Discharge recommendations: If patient is planned to go back to [facility] today, can be discharged on above recs.   - Patient can follow up at discharge within 2 weeks with Dr. Connor Sabillon. [facility] by calling [phone] to make an appointment.      Case discussed with Dr. Molina. Primary team updated.       Lakeshia Aleman   Endocrinology Fellow    Service Pager: [phone]        SUBJECTIVE / INTERVAL HPI: Patient was seen and examined this morning. Appetite better. Ate 50% of breakfast (smoothie, oatmeal, english muffin). Stopped dextrose around noon. Lunch: Rice/ salmon.     Overnight events: Hypoglycemia 71 around bedtime. D5NS increased to 70cc/hr     CAPILLARY BLOOD GLUCOSE & INSULIN RECEIVED  135 mg/dL (10-31 @ 02:00)  98 mg/dL (10-31 @ 03:05)  70 mg/dL (10-31 @ 03:57)  96 mg/dL (10-31 @ 04:56)  66 mg/dL (10-31 @ 06:33)  162 mg/dL (10-31 @ 07:28)  108 mg/dL (10-31 @ 08:59)  75 mg/dL (10-31 @ 11:06) D5 at 30cc/hr   104 mg/dL (10-31 @ 11:24)  162 mg/dL (10-31 @ 16:06) + 1, D5NS at 50cc/hr   71 mg/dL (10-31 @ 21:49) Juice, D5NS at 70 cc/hr   137 mg/dL (10-31 @ 23:34) lantus 10   117 mg/dL (11-01 @ 03:04)  97 mg/dL (11-01 @ 07:18)   101 mg/dL (11-01 @ 09:36)  323 mg/dL (11-01 @ 12:56)       REVIEW OF SYSTEMS  Constitutional:  Negative fever, chills   Cardiovascular:  Negative for chest pain or palpitations.  Respiratory:  Negative for cough, wheezing, or shortness of breath.    Gastrointestinal:  Negative for nausea, vomiting, diarrhea, constipation, or abdominal pain.  Genitourinary:  Negative frequency, urgency or dysuria.  Neurologic:  No headache, confusion, dizziness, lightheadedness.    PHYSICAL EXAM  Vital Signs Last 24 Hrs  T(C): 36.7 (01 Nov 2024 05:48), Max: 37.3 (31 Oct 2024 14:01)  T(F): 98 (01 Nov 2024 05:48), Max: 99.1 (31 Oct 2024 14:01)  HR: 80 (01 Nov 2024 05:48) (73 - 82)  BP: 137/66 (01 Nov 2024 05:48) (115/59 - 162/66)  BP(mean): 90 (01 Nov 2024 05:48) (85 - 100)  RR: 18 (01 Nov 2024 05:48) (16 - 19)  SpO2: 98% (01 Nov 2024 05:48) (95% - 98%)    Parameters below as of 01 Nov 2024 05:48  Patient On (Oxygen Delivery Method): room air      Constitutional: Awake, alert, in no acute distress. AOx2   HEENT: Normocephalic, atraumatic,  Respiratory: Lungs clear to ausculation bilaterally.   Cardiovascular: regular rhythm, normal S1 and S2, no audible murmurs.   GI: soft, non-tender, non-distended, bowel sounds present.  Extremities: No lower extremity edema.     LABS  CBC - WBC/HGB/HTC/PLT: 5.25/9.4/31.3/171 (11-01-24)  BMP - Na/K/Cl/Bicarb/BUN/Cr/Gluc/AG/eGFR: 140/3.6/105/27/20/1.09/97/8/52 (11-01-24)  Ca - 8.4 (11-01-24)  Phos - 2.5 (11-01-24)  Mg - 1.8 (11-01-24)  LFT - Alb/Tprot/Tbili/Dbili/AlkPhos/ALT/AST: 2.8/--/0.3/--/107/40/74 (11-01-24)      10-31-24 @ 07:01  -  11-01-24 @ 07:00  --------------------------------------------------------  IN: 300 mL / OUT: 1000 mL / NET: -700 mL      MEDICATIONS  MEDICATIONS  (STANDING):  amLODIPine   Tablet 2.5 milliGRAM(s) Oral at bedtime  chlorhexidine 2% Cloths 1 Application(s) Topical <User Schedule>  dextrose 5% + sodium chloride 0.9%. 1000 milliLiter(s) (70 mL/Hr) IV Continuous <Continuous>  dextrose 5%. 1000 milliLiter(s) (100 mL/Hr) IV Continuous <Continuous>  dextrose 5%. 1000 milliLiter(s) (50 mL/Hr) IV Continuous <Continuous>  dextrose 50% Injectable 25 Gram(s) IV Push once  escitalopram 5 milliGRAM(s) Oral daily  estrogens  Cream 0.5 Gram(s) Vaginal once  glucagon  Injectable 1 milliGRAM(s) IntraMuscular once  heparin   Injectable 5000 Unit(s) SubCutaneous every 12 hours  insulin glargine Injectable (LANTUS) 10 Unit(s) SubCutaneous at bedtime  insulin lispro (ADMELOG) corrective regimen sliding scale   SubCutaneous three times a day before meals  insulin lispro (ADMELOG) corrective regimen sliding scale   SubCutaneous at bedtime  losartan 25 milliGRAM(s) Oral daily  magnesium sulfate  IVPB 1 Gram(s) IV Intermittent once  rivastigmine patch  4.6 mG/24 Hr(s) 1 Patch Transdermal every 24 hours    MEDICATIONS  (PRN):  dextrose Oral Gel 15 Gram(s) Oral once PRN Blood Glucose LESS THAN 70 milliGRAM(s)/deciliter    ASSESSMENT / RECOMMENDATIONS  IVETTE ROSE is a 78yo F resident in [facility], w/ PMHx of dementia, osteoporosis (on Prolia), anxiety and depression, T1DM, HTN and history of locally advanced HARSHA pancreatic cancer (T2N2) diagnosed 2020 and now status-post Whipple and adjuvant chemotherapy - presenting to ED w/ elevated glucose levels, lethargy/disorientaion & generalized weakness. Found to be in DKA now resolved. Complicated by episodes of Hypoglycemia.     Last a1c outpatient 9/16: 7.3, now 7.4   Creatinine: 1.42, GFR: 38  Weight: 47.6 kg     #Type 1 diabetes mellitus with hyperglycemia  #Pancreatic cancer S/p Whipple + chemotherapy without recurrence    - appetite improving, more alert today   - Decrease lantus to 8 units qhs   - Start 3 units lispro before meals  - C/w Low insulin sliding scale for now before meals and bedtime  - Patient's fingerstick glucose goal is 100-180 mg/dL.    - Discharge recommendations: If patient is planned to go back to Assisted living Facility today, can be discharged on above recs. Patient will benefit from Dexcom or Free Style Jett for better glucose monitoring at nursing home. (She has cell phone and is able to use it). Will set up outpatient when she follows up.   - Patient can follow up at discharge within 2 weeks with Dr. Connor Sabillon. ([facility], [phone]). Will email office to set up appointment.     Case discussed with Dr. Molina. Primary team updated.       Lakeshia Aleman   Endocrinology Fellow    Service Pager: [phone]

## 2024-11-01 NOTE — DIETITIAN INITIAL EVALUATION ADULT - PERTINENT LABORATORY DATA
11-01    140  |  105  |  20  ----------------------------<  97  3.6   |  27  |  1.09    Ca    8.4      01 Nov 2024 05:30  Phos  2.5     11-01  Mg     1.8     11-01    TPro  5.2[L]  /  Alb  2.8[L]  /  TBili  0.3  /  DBili  x   /  AST  74[H]  /  ALT  40  /  AlkPhos  107  11-01  POCT Blood Glucose.: 323 mg/dL (11-01-24 @ 12:56)  A1C with Estimated Average Glucose Result: 7.4 % (11-01-24 @ 05:30)

## 2024-11-01 NOTE — PROGRESS NOTE ADULT - PROBLEM SELECTOR PLAN 2
Unknown baseline. P/w BUN/Cr 53/2.58 on admission. Likely element of pre-renal as pt very dry on exam, iso DKA.   Creatine downtrending since receiving fluids, now 1.42  BUN downtrending since receiving fluids, now 26   -ctm Cr and BUN RESOLVED     Unknown baseline. P/w BUN/Cr 53/2.58 on admission. Likely element of pre-renal as pt very dry on exam, iso DKA.   Creatine downtrending since receiving fluids, now 1.09  BUN downtrending since receiving fluids, now 20 and wnl   -ctm Cr and BUN

## 2024-11-01 NOTE — DIETITIAN NUTRITION RISK NOTIFICATION - TREATMENT: THE FOLLOWING DIET HAS BEEN RECOMMENDED
Continue Consistent Carbohydrate diet, recommend add Glucerna Shake (220kcal, 10g protein) x1/day. Defer consistency to team/SLP.   >>Continue nursing assistance with meals.   >>Encourage & monitor PO intake. [place] dietary preferences as able.

## 2024-11-01 NOTE — DISCHARGE NOTE PROVIDER - HOSPITAL COURSE
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 B/L; no W/R/R  Gastrointestinal: soft, NT/ND, pos bowel sounds   Extremities: WWP; trace pit edema to the knees, clubbing or cyanosis  Vascular: 2+ radial, DP/PT pulses B/L  Neurological: AAOx3; no focal deficits

## 2024-11-01 NOTE — DIETITIAN INITIAL EVALUATION ADULT - OTHER INFO
79F with PMH of dementia, osteoporosis, anxiety, depression, DM, HTN, and locally advanced HARSHA pancreatic cancer (dx 2020, status post whipple and adjunctive chemotherapy) who presented with elevated blood glucose, lethargy, and generalized weakness, found with DKA, admitted for management.     Pt seen on 7UR assessment. Labs and medication orders reviewed. Ordered for 10U lantus, IV magnesium; note history of whipple with no order for pancreatic enzymes. HgbA1c 7.4%, electrolytes WNL, POC blood glucose (10/31-11/1) . On Consistent Carbohydrate diet. Pt sitting up in bed on visit this AM, eating breakfast. Pt intermittently confused throughout interview, consistent with baseline per nursing documentation. Reports fair appetite, unable to provide diet recall. RD observed pt consumed ~25% breakfast tray this AM. pt reports UBW ~110lb; wt history per [facility] HIE: (8/1/24) 116lb; current admission wt 105lb 11lb/9% wt loss in 3 months - clinically significant. Nutrition-focused physical examination notable for mild-moderate muscle and fat wasting. Suspect prolonged inadequate intake. Per ASPEN guidelines, pt meets criteria for moderate malnutrition - see nutrition risk notification. Pt denies difficulty chewing/swallowing on current diet. Denies nausea/vomiting/diarrhea/constipation, last recorded BM 10/30. Confirms no known food allergies. No Alevism/ethnic/cultural food preferences noted. No edema documented, stage I medical back pressure injury noted, Jovani score 11. No ht in chart, pt reports 4'11". See nutrition recommendations - RD communicated to team. RD to remain available.  79F with PMH of dementia, osteoporosis, anxiety, depression, DM, HTN, and locally advanced HARSHA pancreatic cancer (dx 2020, status post whipple and adjunctive chemotherapy) who presented with elevated blood glucose, lethargy, and generalized weakness, found with DKA, admitted for management.     Pt seen on 7UR assessment. Labs and medication orders reviewed. Ordered for 10U lantus, IV magnesium; note history of whipple with no order for pancreatic enzymes. HgbA1c 7.4%, electrolytes WNL, POC blood glucose (10/31-11/1) . On Consistent Carbohydrate diet. Pt sitting up in bed on visit this AM, eating breakfast. Pt intermittently confused throughout interview, consistent with baseline per nursing documentation. Reports fair appetite, unable to provide diet recall. RD observed pt consumed ~25% breakfast tray this AM. Pt reports UBW ~110lb; wt history per [facility] HIE: (8/1/24) 116lb; current admission wt 105lb 11lb/9% wt loss in 3 months - clinically significant. Nutrition-focused physical examination notable for mild-moderate muscle and fat wasting. Suspect prolonged inadequate intake. Per ASPEN guidelines, pt meets criteria for moderate malnutrition - see nutrition risk notification. Pt denies difficulty chewing/swallowing on current diet. Denies nausea/vomiting/diarrhea/constipation, last recorded BM 10/30. Confirms no known food allergies. No Yazidi/ethnic/cultural food preferences noted. No edema documented, stage I medical back pressure injury noted, Jovani score 11. No ht in chart, pt reports 4'11". See nutrition recommendations - RD communicated to team. RD to remain available.

## 2024-11-01 NOTE — DISCHARGE NOTE PROVIDER - PROVIDER TOKENS
PROVIDER:[TOKEN:[[id number]],SCHEDULEDAPPT:[11/11/2024],SCHEDULEDAPPTTIME:[10:00 AM],ESTABLISHEDPATIENT:[T]]

## 2024-11-01 NOTE — DIETITIAN INITIAL EVALUATION ADULT - NS FNS DIET ORDER
Diet, Regular:   Consistent Carbohydrate {Evening Snack} (CSTCHOSN) (10-31-24 @ 06:59) [Active]

## 2024-11-01 NOTE — DISCHARGE NOTE PROVIDER - ATTENDING DISCHARGE PHYSICAL EXAMINATION:
Patient was seen and examined at bedside on 11/4/2024 at 1130 am with HCP present. Patient reports improvement of her symptoms, feels well. Denies abdominal pain, CP, pain, N/V. ROS Is otherwise negative. Vitals, labwork and pertinent imaging reviewed. Exam - NAD, alert, PERRLA, EOMI, MMM, supple neck, chest - CTA b/l, CV - rrr, s1s2, no m/r/g, abd - soft, NTND, + BS, ext - wwp, no edema, psych - normal affect, skin - no rash, patient appears cachectic and malnourished    Plan:  -C/w insulin (basal bolus) as per Endocrinology recommendations, will decrease Lantus to 3 units, c/w pre meal 3 units  -PT/OT reevaluation for JUSTO placement - plan for d/c   -Discussed above medication regimen and plan with Dr. Prabhakar from [facility]

## 2024-11-01 NOTE — DIETITIAN NUTRITION RISK NOTIFICATION - ADDITIONAL COMMENTS/DIETITIAN RECOMMENDATIONS
Pt reports UBW ~110lb; wt history per [facility] HIE: (8/1/24) 116lb; current admission wt 105lb 11lb/9% wt loss in 3 months - clinically significant. Nutrition-focused physical examination notable for mild-moderate muscle and fat wasting. Suspect prolonged inadequate intake. Per ASPEN guidelines, pt meets criteria for moderate malnutrition.

## 2024-11-01 NOTE — DIETITIAN INITIAL EVALUATION ADULT - EDUCATION DIETARY MODIFICATIONS
RD attempted to provide nutrition education however pt confused and unable to provide evidence of comprehension. Pt aware RD remains available for additional questions/concerns.

## 2024-11-01 NOTE — DIETITIAN INITIAL EVALUATION ADULT - PERTINENT MEDS FT
MEDICATIONS  (STANDING):  amLODIPine   Tablet 2.5 milliGRAM(s) Oral at bedtime  chlorhexidine 2% Cloths 1 Application(s) Topical <User Schedule>  dextrose 5%. 1000 milliLiter(s) (50 mL/Hr) IV Continuous <Continuous>  dextrose 5%. 1000 milliLiter(s) (100 mL/Hr) IV Continuous <Continuous>  dextrose 50% Injectable 25 Gram(s) IV Push once  escitalopram 5 milliGRAM(s) Oral daily  estrogens  Cream 0.5 Gram(s) Vaginal once  glucagon  Injectable 1 milliGRAM(s) IntraMuscular once  heparin   Injectable 5000 Unit(s) SubCutaneous every 12 hours  insulin glargine Injectable (LANTUS) 10 Unit(s) SubCutaneous at bedtime  insulin lispro (ADMELOG) corrective regimen sliding scale   SubCutaneous three times a day before meals  insulin lispro (ADMELOG) corrective regimen sliding scale   SubCutaneous at bedtime  losartan 25 milliGRAM(s) Oral daily  magnesium sulfate  IVPB 1 Gram(s) IV Intermittent once  rivastigmine patch  4.6 mG/24 Hr(s) 1 Patch Transdermal every 24 hours    MEDICATIONS  (PRN):  dextrose Oral Gel 15 Gram(s) Oral once PRN Blood Glucose LESS THAN 70 milliGRAM(s)/deciliter

## 2024-11-01 NOTE — PROGRESS NOTE ADULT - PROBLEM SELECTOR PLAN 1
RESOLVED  The patient presented with lethargy, confusion, and nausea (without vomiting). Laboratory findings showed glucose levels exceeding 1000 mg/dL, bicarbonate at 4 mEq/L, an anion gap of 32, and ß-hydroxybutyrate greater than 8. Potassium was 6.3 mEq/L, and phosphorus was 8.7 mg/dL, indicating diabetic ketoacidosis (DKA). A family member reported no prior history of DKA.     The patient received 2 g of calcium gluconate, 2 L of normal saline (NS), and an insulin drip was initiated in the emergency department. Treatment continued with the DKA protocol in the MICU, leading to normalization of glucose levels, ß-hydroxybutyrate levels returning to normal at 0.1, and the anion gap closing twice.

## 2024-11-01 NOTE — DISCHARGE NOTE PROVIDER - NSDCMRMEDTOKEN_GEN_ALL_CORE_FT
amLODIPine 2.5 mg oral tablet: 1 tab(s) orally once a day (at bedtime)  Bactrim  mg-160 mg oral tablet: 1 tab(s) orally 2 times a day For UTI  docusate sodium 100 mg oral tablet: 1 tab(s) orally 3 times a day  ergocalciferol 1.25 mg (50,000 intl units) oral tablet: 1 tab(s) orally once a week Every thursday for vitamin D deficiency for 8 weeks  escitalopram 5 mg oral tablet: 1 cap(s) orally once a day Escitalopram Oxalate  losartan 25 mg oral tablet: 1 tab(s) orally once a day Losartan Potassium  Lyumjev 100 units/mL injectable solution: injectable Inject per sliding scale: if 0-200, 0 units; if 201-250, 2 units; if 251-300, 4 units; if 301-350, 6 units; if 351-400, 8 units subcutaneously before meals for diabetes; 400+, notify provider  Lyumjev KwikPen 100 units/mL injectable solution: 7 unit(s) injectable 3 times a day (before meals) For type 1 diabetes; do not inject if FS&lt;90  Premarin 0.625 mg/g vaginal cream with applicator: 0.5 gram(s) intravaginally 2 times a week Every tuesday and friday for postmenopausal atrophic vaginitis  rivastigmine 4.6 mg/24 hr transdermal film, extended release: 1 patch transdermally once a day Apply in the morning for altered mental state  sorbitol 70% oral liquid: 5 milliliter(s) orally once a day as needed for diabetes type 1  Tresiba FlexTouch 100 units/mL subcutaneous solution: 10 unit(s) subcutaneous once a day (in the morning)   amLODIPine 2.5 mg oral tablet: 1 tab(s) orally once a day (at bedtime)  docusate sodium 100 mg oral tablet: 1 tab(s) orally 3 times a day  ergocalciferol 1.25 mg (50,000 intl units) oral tablet: 1 tab(s) orally once a week Every thursday for vitamin D deficiency for 8 weeks  escitalopram 5 mg oral tablet: 1 cap(s) orally once a day Escitalopram Oxalate  insulin glargine 100 units/mL subcutaneous solution: 5 unit(s) subcutaneous once a day (at bedtime)  insulin lispro 100 units/mL injectable solution: 3 unit(s) injectable 3 times a day Take lispro 3 units before meals  Continue with low-dose sliding scale before meals and at bedtime  losartan 25 mg oral tablet: 1 tab(s) orally once a day Losartan Potassium  Premarin 0.625 mg/g vaginal cream with applicator: 0.5 gram(s) intravaginally 2 times a week Every tuesday and friday for postmenopausal atrophic vaginitis  rivastigmine 4.6 mg/24 hr transdermal film, extended release: 1 patch transdermally once a day Apply in the morning for altered mental state  sorbitol 70% oral liquid: 5 milliliter(s) orally once a day as needed for diabetes type 1   amLODIPine 2.5 mg oral tablet: 1 tab(s) orally once a day (at bedtime)  docusate sodium 100 mg oral tablet: 1 tab(s) orally 3 times a day  ergocalciferol 1.25 mg (50,000 intl units) oral tablet: 1 tab(s) orally once a week Every thursday for vitamin D deficiency for 8 weeks  escitalopram 5 mg oral tablet: 1 cap(s) orally once a day Escitalopram Oxalate  insulin glargine 100 units/mL subcutaneous solution: 3 unit(s) subcutaneous once a day (at bedtime)  insulin lispro 100 units/mL injectable solution: 3 unit(s) injectable 3 times a day Take lispro 3 units before meals  Continue with low-dose sliding scale before meals and at bedtime  losartan 25 mg oral tablet: 1 tab(s) orally once a day Losartan Potassium  Premarin 0.625 mg/g vaginal cream with applicator: 0.5 gram(s) intravaginally 2 times a week Every tuesday and friday for postmenopausal atrophic vaginitis  rivastigmine 4.6 mg/24 hr transdermal film, extended release: 1 patch transdermally once a day Apply in the morning for altered mental state  sorbitol 70% oral liquid: 5 milliliter(s) orally once a day as needed for diabetes type 1

## 2024-11-01 NOTE — PROGRESS NOTE ADULT - PROBLEM SELECTOR PLAN 3
Hx Type 1 DM, on basal bolus insulin however doses unclear  Per [facility], patient on tresiba (insulin degludec) 10 units subQ every morning. Also on insulin lispro sliding scale and 7u subQ before meals    Patient has episodes of hypoglycemia post DKA requiring  amp of D50. Most likely in the setting of poor oral intake. Family members report that patient is only eating a few bites of each meal and does not ask for food. Patient also endorses lack of appetite.     Endocrinology following, their reccs as of today (10/31) are as follows    -Lantus 10 units qd  -  D5NS at 50cc/hr as patient is not eating consistently  - Low insulin sliding scale for now before meals and bedtime  - F/u a1c   - Patient's fingerstick glucose goal is 100-180 mg/dL.    - Discharge recommendations will be provided Hx Type 1 DM, on basal bolus insulin however doses unclear  Per [facility], patient on tresiba (insulin degludec) 10 units subQ every morning. Also on insulin lispro sliding scale and 7u subQ before meals    Patient has episodes of hypoglycemia post DKA requiring  amp of D50. Most likely in the setting of poor oral intake. Family members report that patient is only eating a few bites of each meal and does not ask for food. Patient also endorses lack of appetite.     Patient reporting improved appetite today 11/1. Nutrition consulted and has added 1 can of Glucerna to Carb Consistent diet. Also added Creon In the setting of the whipple procedure.     Endocrinology following, their reccs as of today (11/1) are as follows    - Lantus 8 units qhs   - 3 units lispro before meals  - Low insulin sliding scale for now before meals and bedtime  - Patient's fingerstick glucose goal is 100-180 mg/dL.    - Patient  can be discharged to facility on above recs.   - Follow up appt with CLIFTON Wise at [facility] 11/11 @ 10 am

## 2024-11-01 NOTE — DISCHARGE NOTE PROVIDER - CARE PROVIDER_API CALL
Amber Wise  NP in Primary Care Adult-Gerontology  [address]  [place], [place] [address]  Phone: [phone]  Fax: [phone]  Established Patient  Scheduled Appointment: 11/11/2024 10:00 AM

## 2024-11-01 NOTE — PHYSICAL THERAPY INITIAL EVALUATION ADULT - GAIT DEVIATIONS NOTED, PT EVAL
Fairly steady gait no LOB, gait distance limited by fatigue/decreased pancho/decreased step length/decreased stride length

## 2024-11-01 NOTE — DIETITIAN INITIAL EVALUATION ADULT - ADD RECOMMEND
1. Continue Consistent Carbohydrate diet, recommend add Glucerna Shake (220kcal, 10g protein) x1/day. Defer consistency to team/SLP.   >>Continue nursing assistance with meals.   >>Encourage & monitor PO intake. [place] dietary preferences as able.   2. Consider addition of pancreatic enzymes in setting of hx of whipple surgery.   3. Monitor GI tolerance, weight trends, labs, & skin integrity.  4. Defer bowel and pain regimens to team.   5. RD to remain available for diet education/intervention prn.

## 2024-11-01 NOTE — PROGRESS NOTE ADULT - ASSESSMENT
80yo F resident in [facility], w/ PMHx of dementia, osteoporosis (on Prolia), anxiety and depression, T1DM, HTN and history of locally advanced HARSHA pancreatic cancer (T2N2) diagnosed 2020 and now status-post Whipple and adjuvant chemotherapy - presenting to ED w/ elevated glucose levels, lethargy/disorientation & generalized weakness - found with DKA, requiring admission to ICU, now stable for RMF.

## 2024-11-01 NOTE — PHYSICAL THERAPY INITIAL EVALUATION ADULT - ORIENTATION, REHAB EVAL
A&O x2 - patient knows they are in the hospital but does not know the name, patient knows the year but does not know the date/person/place

## 2024-11-01 NOTE — DISCHARGE NOTE PROVIDER - NSDCCPCAREPLAN_GEN_ALL_CORE_FT
PRINCIPAL DISCHARGE DIAGNOSIS  Diagnosis: DKA (diabetic ketoacidosis)  Assessment and Plan of Treatment: Diabetic ketoacidosis (DKA) happens when the body doesn’t have enough insulin to use glucose (sugar) for energy, so it breaks down fat instead. This process produces acids called ketones, which build up in the blood and make it more acidic. High blood sugar, dehydration, and an imbalance of salts in the body make DKA a serious condition that needs emergency treatment. To prevent DKA please adhere to the new insulin regimen below.  -You should take 8 units of Lantus, which is a long-acting insulin, every night at bedtime.  This dosing schedule is used to help control blood sugar levels throughout the night and into the following day.  -You should take 3 units of lispro insulin, which is a rapid-acting insulin, just before each meal. This type of insulin helps to lower blood sugar levels by allowing the body to utilize glucose from the food that is consumed. Taking it before meals helps to ensure that insulin is available in the bloodstream when blood sugar levels start to rise after eating.  -You will use a a low-dose insulin sliding scale to adjust yourinsulin intake based on your current blood sugar levels before each meal and at bedtime.  Here’s how it works:  Sliding Scale: This is a method where the amount of insulin given is based on the patient's blood glucose readings. If the blood sugar is higher, more insulin is given; if it's lower, less insulin is administered.  Low Insulin: The term "low insulin" suggests that the amounts on the sliding scale are kept at a lower range to avoid the risk of hypoglycemia (low blood sugar), especially since the patient's appetite and food intake may not be stable.  This approach allows for flexible insulin dosing tailored to the patient's immediate needs while maintaining better blood sugar control.      SECONDARY DISCHARGE DIAGNOSES  Diagnosis: Type 1 diabetes mellitus  Assessment and Plan of Treatment: A Whipple procedure, or pancreaticoduodenectomy, is a surgery to remove the head of the pancreas, along with part of the small intestine, bile duct, and sometimes the stomach. After this surgery, patients may develop diabetes because the pancreas produces less insulin, which is needed to regulate blood sugar levels. This type of diabetes is often referred to as "surgical diabetes" or "pancreatogenic diabetes," and it may require insulin therapy or other medications to manage blood sugar levels effectively.  -You should take 8 units of Lantus, which is a long-acting insulin, every night at bedtime.  This dosing schedule is used to help control blood sugar levels throughout the night and into the following day.  -You should take 3 units of lispro insulin, which is a rapid-acting insulin, just before each meal. This type of insulin helps to lower blood sugar levels by allowing the body to utilize glucose from the food that is consumed. Taking it before meals helps to ensure that insulin is available in the bloodstream when blood sugar levels start to rise after eating. Please adhere to your new diabetic regimen below.  -You will use a a low-dose insulin sliding scale to adjust yourinsulin intake based on your current blood sugar levels before each meal and at bedtime.  Here’s how it works:  Sliding Scale: This is a method where the amount of insulin given is based on the patient's blood glucose readings. If the blood sugar is higher, more insulin is given; if it's lower, less insulin is administered.  Low Insulin: The term "low insulin" suggests that the amounts on the sliding scale are kept at a lower range to avoid the risk of hypoglycemia (low blood sugar), especially since the patient's appetite and food intake may not be stable.  This approach allows for flexible insulin dosing tailored to the patient's immediate needs while maintaining better blood sugar control.     PRINCIPAL DISCHARGE DIAGNOSIS  Diagnosis: DKA (diabetic ketoacidosis)  Assessment and Plan of Treatment: Diabetic ketoacidosis (DKA) happens when the body doesn’t have enough insulin to use glucose (sugar) for energy, so it breaks down fat instead. This process produces acids called ketones, which build up in the blood and make it more acidic. High blood sugar, dehydration, and an imbalance of salts in the body make DKA a serious condition that needs emergency treatment. To prevent DKA please adhere to the new insulin regimen below.  -You should take 5 units of Lantus, which is a long-acting insulin, every night at bedtime.  This dosing schedule is used to help control blood sugar levels throughout the night and into the following day.  -You should take 3 units of lispro insulin, which is a rapid-acting insulin, just before each meal. This type of insulin helps to lower blood sugar levels by allowing the body to utilize glucose from the food that is consumed. Taking it before meals helps to ensure that insulin is available in the bloodstream when blood sugar levels start to rise after eating.  -You will use a a low-dose insulin sliding scale to adjust your insulin intake based on your current blood sugar levels before each meal and at bedtime.  - Please follow up with endocrinology with the appointment provided in the paperwork.   Here’s how it works:  Sliding Scale: This is a method where the amount of insulin given is based on the patient's blood glucose readings. If the blood sugar is higher, more insulin is given; if it's lower, less insulin is administered.  Low Insulin: The term "low insulin" suggests that the amounts on the sliding scale are kept at a lower range to avoid the risk of hypoglycemia (low blood sugar), especially since the patient's appetite and food intake may not be stable.  This approach allows for flexible insulin dosing tailored to the patient's immediate needs while maintaining better blood sugar control.      SECONDARY DISCHARGE DIAGNOSES  Diagnosis: Type 1 diabetes mellitus  Assessment and Plan of Treatment: A Whipple procedure, or pancreaticoduodenectomy, is a surgery to remove the head of the pancreas, along with part of the small intestine, bile duct, and sometimes the stomach. After this surgery, patients may develop diabetes because the pancreas produces less insulin, which is needed to regulate blood sugar levels. This type of diabetes is often referred to as "surgical diabetes" or "pancreatogenic diabetes," and it may require insulin therapy or other medications to manage blood sugar levels effectively.  -You should take 5 units of Lantus, which is a long-acting insulin, every night at bedtime.  This dosing schedule is used to help control blood sugar levels throughout the night and into the following day.  -You should take 3 units of lispro insulin, which is a rapid-acting insulin, just before each meal. This type of insulin helps to lower blood sugar levels by allowing the body to utilize glucose from the food that is consumed. Taking it before meals helps to ensure that insulin is available in the bloodstream when blood sugar levels start to rise after eating. Please adhere to your new diabetic regimen below.  -You will use a a low-dose insulin sliding scale to adjust yourinsulin intake based on your current blood sugar levels before each meal and at bedtime.  Here’s how it works:  Sliding Scale: This is a method where the amount of insulin given is based on the patient's blood glucose readings. If the blood sugar is higher, more insulin is given; if it's lower, less insulin is administered.  Low Insulin: The term "low insulin" suggests that the amounts on the sliding scale are kept at a lower range to avoid the risk of hypoglycemia (low blood sugar), especially since the patient's appetite and food intake may not be stable.  This approach allows for flexible insulin dosing tailored to the patient's immediate needs while maintaining better blood sugar control.     PRINCIPAL DISCHARGE DIAGNOSIS  Diagnosis: DKA (diabetic ketoacidosis)  Assessment and Plan of Treatment: Diabetic ketoacidosis (DKA) happens when the body doesn’t have enough insulin to use glucose (sugar) for energy, so it breaks down fat instead. This process produces acids called ketones, which build up in the blood and make it more acidic. High blood sugar, dehydration, and an imbalance of salts in the body make DKA a serious condition that needs emergency treatment. To prevent DKA please adhere to the new insulin regimen below.  -You should take 3 units of Lantus, which is a long-acting insulin, every night at bedtime.  This dosing schedule is used to help control blood sugar levels throughout the night and into the following day.  -You should take 3 units of lispro insulin, which is a rapid-acting insulin, just before each meal. This type of insulin helps to lower blood sugar levels by allowing the body to utilize glucose from the food that is consumed. Taking it before meals helps to ensure that insulin is available in the bloodstream when blood sugar levels start to rise after eating.  -You will use a a low-dose insulin sliding scale to adjust your insulin intake based on your current blood sugar levels before each meal and at bedtime.  - Please follow up with endocrinology with the appointment provided in the paperwork.   Here’s how it works:  Sliding Scale: This is a method where the amount of insulin given is based on the patient's blood glucose readings. If the blood sugar is higher, more insulin is given; if it's lower, less insulin is administered.  Low Insulin: The term "low insulin" suggests that the amounts on the sliding scale are kept at a lower range to avoid the risk of hypoglycemia (low blood sugar), especially since the patient's appetite and food intake may not be stable.  This approach allows for flexible insulin dosing tailored to the patient's immediate needs while maintaining better blood sugar control.      SECONDARY DISCHARGE DIAGNOSES  Diagnosis: Type 1 diabetes mellitus  Assessment and Plan of Treatment: A Whipple procedure, or pancreaticoduodenectomy, is a surgery to remove the head of the pancreas, along with part of the small intestine, bile duct, and sometimes the stomach. After this surgery, patients may develop diabetes because the pancreas produces less insulin, which is needed to regulate blood sugar levels. This type of diabetes is often referred to as "surgical diabetes" or "pancreatogenic diabetes," and it may require insulin therapy or other medications to manage blood sugar levels effectively.  -You should take 3 units of Lantus, which is a long-acting insulin, every night at bedtime.  This dosing schedule is used to help control blood sugar levels throughout the night and into the following day.  -You should take 3 units of lispro insulin, which is a rapid-acting insulin, just before each meal. This type of insulin helps to lower blood sugar levels by allowing the body to utilize glucose from the food that is consumed. Taking it before meals helps to ensure that insulin is available in the bloodstream when blood sugar levels start to rise after eating. Please adhere to your new diabetic regimen below.  -You will use a a low-dose insulin sliding scale to adjust yourinsulin intake based on your current blood sugar levels before each meal and at bedtime.  Here’s how it works:  Sliding Scale: This is a method where the amount of insulin given is based on the patient's blood glucose readings. If the blood sugar is higher, more insulin is given; if it's lower, less insulin is administered.  Low Insulin: The term "low insulin" suggests that the amounts on the sliding scale are kept at a lower range to avoid the risk of hypoglycemia (low blood sugar), especially since the patient's appetite and food intake may not be stable.  This approach allows for flexible insulin dosing tailored to the patient's immediate needs while maintaining better blood sugar control.

## 2024-11-01 NOTE — PHYSICAL THERAPY INITIAL EVALUATION ADULT - GENERAL OBSERVATIONS, REHAB EVAL
KIMO Shields aware of intent to eval. Patient received semi-supine in NAD with +IV, +SCD, +Primafit.

## 2024-11-01 NOTE — DISCHARGE NOTE PROVIDER - DETAILS OF MALNUTRITION DIAGNOSIS/DIAGNOSES
This patient has been assessed with a concern for Malnutrition and was treated during this hospitalization for the following Nutrition diagnosis/diagnoses:     -  11/01/2024: Moderate protein-calorie malnutrition

## 2024-11-01 NOTE — PHYSICAL THERAPY INITIAL EVALUATION ADULT - PERTINENT HX OF CURRENT PROBLEM, REHAB EVAL
Patient is a 80 y/o female resident in [facility] with PMHx of dementia, osteoporosis (on Prolia), anxiety and depression, T1DM, HTN and history of locally advanced HARSHA pancreatic cancer (T2N2) diagnosed 2020 and now status-post Whipple and adjuvant chemotherapy - presenting to ED w/ elevated glucose levels, lethargy/disorientation & generalized weakness - found with DKA, requiring admission to ICU, now stable for RMF.

## 2024-11-01 NOTE — PROGRESS NOTE ADULT - SUBJECTIVE AND OBJECTIVE BOX
CC: Patient is a 79y old  Female who presents with a chief complaint of DKA (31 Oct 2024 19:18)      INTERVAL EVENTS: FILIBERTO    SUBJECTIVE / INTERVAL HPI: Patient seen and examined at bedside.     ROS: negative unless otherwise stated above.    VITAL SIGNS:  Vital Signs Last 24 Hrs  T(C): 36.7 (01 Nov 2024 05:48), Max: 37.3 (31 Oct 2024 14:01)  T(F): 98 (01 Nov 2024 05:48), Max: 99.1 (31 Oct 2024 14:01)  HR: 80 (01 Nov 2024 05:48) (70 - 82)  BP: 137/66 (01 Nov 2024 05:48) (96/55 - 162/66)  BP(mean): 90 (01 Nov 2024 05:48) (72 - 100)  RR: 18 (01 Nov 2024 05:48) (16 - 20)  SpO2: 98% (01 Nov 2024 05:48) (95% - 100%)    Parameters below as of 01 Nov 2024 05:48  Patient On (Oxygen Delivery Method): room air          10-31-24 @ 07:01  -  11-01-24 @ 07:00  --------------------------------------------------------  IN: 300 mL / OUT: 1000 mL / NET: -700 mL        PHYSICAL EXAM:    General: NAD  HEENT: MMM  Neck: supple  Cardiovascular: +S1/S2; RRR  Respiratory: CTA B/L; no W/R/R  Gastrointestinal: soft, NT/ND  Extremities: WWP; no edema, clubbing or cyanosis  Vascular: 2+ radial, DP/PT pulses B/L  Neurological: AAOx3; no focal deficits    MEDICATIONS:  MEDICATIONS  (STANDING):  amLODIPine   Tablet 2.5 milliGRAM(s) Oral at bedtime  chlorhexidine 2% Cloths 1 Application(s) Topical <User Schedule>  dextrose 5% + sodium chloride 0.9%. 1000 milliLiter(s) (70 mL/Hr) IV Continuous <Continuous>  dextrose 5%. 1000 milliLiter(s) (50 mL/Hr) IV Continuous <Continuous>  dextrose 5%. 1000 milliLiter(s) (100 mL/Hr) IV Continuous <Continuous>  dextrose 50% Injectable 25 Gram(s) IV Push once  escitalopram 5 milliGRAM(s) Oral daily  estrogens  Cream 0.5 Gram(s) Vaginal once  glucagon  Injectable 1 milliGRAM(s) IntraMuscular once  heparin   Injectable 5000 Unit(s) SubCutaneous every 12 hours  insulin glargine Injectable (LANTUS) 10 Unit(s) SubCutaneous at bedtime  insulin lispro (ADMELOG) corrective regimen sliding scale   SubCutaneous three times a day before meals  insulin lispro (ADMELOG) corrective regimen sliding scale   SubCutaneous at bedtime  losartan 25 milliGRAM(s) Oral daily  rivastigmine patch  4.6 mG/24 Hr(s) 1 Patch Transdermal every 24 hours    MEDICATIONS  (PRN):  dextrose Oral Gel 15 Gram(s) Oral once PRN Blood Glucose LESS THAN 70 milliGRAM(s)/deciliter      ALLERGIES:  Allergies    No Known Allergies    Intolerances        LABS:                        9.0    8.48  )-----------( 176      ( 31 Oct 2024 05:20 )             27.6     10-31    134[L]  |  100  |  26[H]  ----------------------------<  344[H]  3.7   |  26  |  1.42[H]    Ca    8.6      31 Oct 2024 10:51  Phos  2.5     10-31  Mg     1.9     10-31    TPro  5.0[L]  /  Alb  3.0[L]  /  TBili  0.2  /  DBili  x   /  AST  109[H]  /  ALT  47[H]  /  AlkPhos  95  10-31      Urinalysis Basic - ( 31 Oct 2024 10:51 )    Color: x / Appearance: x / SG: x / pH: x  Gluc: 344 mg/dL / Ketone: x  / Bili: x / Urobili: x   Blood: x / Protein: x / Nitrite: x   Leuk Esterase: x / RBC: x / WBC x   Sq Epi: x / Non Sq Epi: x / Bacteria: x      CAPILLARY BLOOD GLUCOSE      POCT Blood Glucose.: 97 mg/dL (01 Nov 2024 07:18)      RADIOLOGY & ADDITIONAL TESTS: Reviewed. CC: Patient is a 79y old  Female who presents with a chief complaint of DKA (31 Oct 2024 19:18)      INTERVAL EVENTS: FILIBERTO    SUBJECTIVE / INTERVAL HPI: Patient seen and examined at bedside. Patient reports feeling less tired, and weak compared to yesterday. Patient denies any bodily pain and discomfort.     Denies fever, chills, chest pain, palpitations, nausea, vomiting, coughing sneezing.     ROS: negative unless otherwise stated above.    VITAL SIGNS:  Vital Signs Last 24 Hrs  T(C): 36.7 (01 Nov 2024 05:48), Max: 37.3 (31 Oct 2024 14:01)  T(F): 98 (01 Nov 2024 05:48), Max: 99.1 (31 Oct 2024 14:01)  HR: 80 (01 Nov 2024 05:48) (70 - 82)  BP: 137/66 (01 Nov 2024 05:48) (96/55 - 162/66)  BP(mean): 90 (01 Nov 2024 05:48) (72 - 100)  RR: 18 (01 Nov 2024 05:48) (16 - 20)  SpO2: 98% (01 Nov 2024 05:48) (95% - 100%)    Parameters below as of 01 Nov 2024 05:48  Patient On (Oxygen Delivery Method): room air          10-31-24 @ 07:01  -  11-01-24 @ 07:00  --------------------------------------------------------  IN: 300 mL / OUT: 1000 mL / NET: -700 mL        PHYSICAL EXAM:    General: NAD  HEENT: MMM  Neck: supple  Cardiovascular: +S1/S2; RRR  Respiratory: CTA B/L; no W/R/R  Gastrointestinal: soft, NT/ND  Extremities: WWP; no edema, clubbing or cyanosis  Vascular: 2+ radial, DP/PT pulses B/L  Neurological: AAOx3; no focal deficits    MEDICATIONS:  MEDICATIONS  (STANDING):  amLODIPine   Tablet 2.5 milliGRAM(s) Oral at bedtime  chlorhexidine 2% Cloths 1 Application(s) Topical <User Schedule>  dextrose 5% + sodium chloride 0.9%. 1000 milliLiter(s) (70 mL/Hr) IV Continuous <Continuous>  dextrose 5%. 1000 milliLiter(s) (50 mL/Hr) IV Continuous <Continuous>  dextrose 5%. 1000 milliLiter(s) (100 mL/Hr) IV Continuous <Continuous>  dextrose 50% Injectable 25 Gram(s) IV Push once  escitalopram 5 milliGRAM(s) Oral daily  estrogens  Cream 0.5 Gram(s) Vaginal once  glucagon  Injectable 1 milliGRAM(s) IntraMuscular once  heparin   Injectable 5000 Unit(s) SubCutaneous every 12 hours  insulin glargine Injectable (LANTUS) 10 Unit(s) SubCutaneous at bedtime  insulin lispro (ADMELOG) corrective regimen sliding scale   SubCutaneous three times a day before meals  insulin lispro (ADMELOG) corrective regimen sliding scale   SubCutaneous at bedtime  losartan 25 milliGRAM(s) Oral daily  rivastigmine patch  4.6 mG/24 Hr(s) 1 Patch Transdermal every 24 hours    MEDICATIONS  (PRN):  dextrose Oral Gel 15 Gram(s) Oral once PRN Blood Glucose LESS THAN 70 milliGRAM(s)/deciliter      ALLERGIES:  Allergies    No Known Allergies    Intolerances        LABS:                        9.0    8.48  )-----------( 176      ( 31 Oct 2024 05:20 )             27.6     10-31    134[L]  |  100  |  26[H]  ----------------------------<  344[H]  3.7   |  26  |  1.42[H]    Ca    8.6      31 Oct 2024 10:51  Phos  2.5     10-31  Mg     1.9     10-31    TPro  5.0[L]  /  Alb  3.0[L]  /  TBili  0.2  /  DBili  x   /  AST  109[H]  /  ALT  47[H]  /  AlkPhos  95  10-31      Urinalysis Basic - ( 31 Oct 2024 10:51 )    Color: x / Appearance: x / SG: x / pH: x  Gluc: 344 mg/dL / Ketone: x  / Bili: x / Urobili: x   Blood: x / Protein: x / Nitrite: x   Leuk Esterase: x / RBC: x / WBC x   Sq Epi: x / Non Sq Epi: x / Bacteria: x      CAPILLARY BLOOD GLUCOSE      POCT Blood Glucose.: 97 mg/dL (01 Nov 2024 07:18)      RADIOLOGY & ADDITIONAL TESTS: Reviewed.

## 2024-11-02 LAB
GLUCOSE BLDC GLUCOMTR-MCNC: 108 MG/DL — HIGH (ref 70–99)
GLUCOSE BLDC GLUCOMTR-MCNC: 213 MG/DL — HIGH (ref 70–99)
GLUCOSE BLDC GLUCOMTR-MCNC: 230 MG/DL — HIGH (ref 70–99)
GLUCOSE BLDC GLUCOMTR-MCNC: 58 MG/DL — LOW (ref 70–99)
GLUCOSE BLDC GLUCOMTR-MCNC: 77 MG/DL — SIGNIFICANT CHANGE UP (ref 70–99)
GLUCOSE BLDC GLUCOMTR-MCNC: 97 MG/DL — SIGNIFICANT CHANGE UP (ref 70–99)

## 2024-11-02 RX ORDER — INSULIN GLARGINE,HUM.REC.ANLOG 100/ML
7 VIAL (ML) SUBCUTANEOUS AT BEDTIME
Refills: 0 | Status: DISCONTINUED | OUTPATIENT
Start: 2024-11-02 | End: 2024-11-03

## 2024-11-02 RX ADMIN — Medication 2: at 18:22

## 2024-11-02 RX ADMIN — Medication 2.5 MILLIGRAM(S): at 21:18

## 2024-11-02 RX ADMIN — Medication 3 UNIT(S): at 18:22

## 2024-11-02 RX ADMIN — HEPARIN SODIUM 5000 UNIT(S): 10000 INJECTION INTRAVENOUS; SUBCUTANEOUS at 18:23

## 2024-11-02 RX ADMIN — ESCITALOPRAM 5 MILLIGRAM(S): 10 TABLET, FILM COATED ORAL at 13:35

## 2024-11-02 RX ADMIN — Medication 2: at 13:34

## 2024-11-02 RX ADMIN — LOSARTAN POTASSIUM 25 MILLIGRAM(S): 25 TABLET ORAL at 06:37

## 2024-11-02 RX ADMIN — RIVASTIGMINE TARTRATE 1 PATCH: 4.5 CAPSULE ORAL at 07:11

## 2024-11-02 RX ADMIN — Medication 3 UNIT(S): at 13:35

## 2024-11-02 RX ADMIN — Medication 7 UNIT(S): at 23:03

## 2024-11-02 RX ADMIN — HEPARIN SODIUM 5000 UNIT(S): 10000 INJECTION INTRAVENOUS; SUBCUTANEOUS at 06:37

## 2024-11-02 NOTE — OCCUPATIONAL THERAPY INITIAL EVALUATION ADULT - MODIFIED CLINICAL TEST OF SENSORY INTEGRATION IN BALANCE TEST
Pt able to ambulate 20 feet x2 with min A using hand held assist and trunk support, demonstrating unsteadiness, impaired postural control, and narrow RAMIRO.

## 2024-11-02 NOTE — PROGRESS NOTE ADULT - PROBLEM SELECTOR PLAN 3
Hx Type 1 DM, on basal bolus insulin however doses unclear  Per SANA, patient on tresiba (insulin degludec) 10 units subQ every morning. Also on insulin lispro sliding scale and 7u subQ before meals    Patient has episodes of hypoglycemia post DKA requiring  amp of D50. Most likely in the setting of poor oral intake. Family members report that patient is only eating a few bites of each meal and does not ask for food. Patient also endorses lack of appetite.     Patient reporting improved appetite today 11/1. Nutrition consulted and has added 1 can of Glucerna to Carb Consistent diet. Also added Creon In the setting of the whipple procedure.     Endocrinology following, their reccs as of today (11/1) are as follows    - Lantus 8 units qhs   - 3 units lispro before meals  - Low insulin sliding scale for now before meals and bedtime  - Patient's fingerstick glucose goal is 100-180 mg/dL.    - Patient  can be discharged to facility on above recs.   - Follow up appt with CLIFTON Wise at [facility] 11/11 @ 10 am

## 2024-11-02 NOTE — OCCUPATIONAL THERAPY INITIAL EVALUATION ADULT - GENERAL OBSERVATIONS, REHAB EVAL
Pt received semi-supine in bed, +heplock, +primafit, NAD, and agreeable to OT. Cleared by KIMO Shields to see.

## 2024-11-02 NOTE — PROGRESS NOTE ADULT - PROBLEM SELECTOR PLAN 2
RESOLVED     Unknown baseline. P/w BUN/Cr 53/2.58 on admission. Likely element of pre-renal as pt very dry on exam, iso DKA.   Creatine downtrending since receiving fluids, now 1.09  BUN downtrending since receiving fluids, now 20 and wnl   -ctm Cr and BUN

## 2024-11-02 NOTE — OCCUPATIONAL THERAPY INITIAL EVALUATION ADULT - ORIENTATION, REHAB EVAL
pt knows the year but not date or month, does not know we are in a hospital, and does not know why she is here/person

## 2024-11-02 NOTE — PROGRESS NOTE ADULT - SUBJECTIVE AND OBJECTIVE BOX
CC: Patient is a 79y old  Female who presents with a chief complaint of DKA (31 Oct 2024 19:18)      INTERVAL EVENTS: FILIBERTO    SUBJECTIVE / INTERVAL HPI: Patient seen and examined at bedside. Patient has no acute complaints. Feels well. Denies SOB, CP. ROS is otherwise negative.     Denies fever, chills, chest pain, palpitations, nausea, vomiting, coughing sneezing.     ROS: negative unless otherwise stated above.      Vital Signs Last 24 Hrs  T(C): 36.2 (02 Nov 2024 06:30), Max: 36.8 (01 Nov 2024 21:53)  T(F): 97.2 (02 Nov 2024 06:30), Max: 98.3 (01 Nov 2024 21:53)  HR: 78 (02 Nov 2024 06:30) (73 - 78)  BP: 166/84 (02 Nov 2024 06:30) (144/84 - 166/84)  BP(mean): --  RR: 18 (02 Nov 2024 06:30) (18 - 18)  SpO2: 97% (02 Nov 2024 06:30) (96% - 97%)            PHYSICAL EXAM:    General: NAD  HEENT: MMM  Neck: supple  Cardiovascular: +S1/S2; RRR  Respiratory: CTA B/L; no W/R/R  Gastrointestinal: soft, NT/ND  Extremities: WWP; no edema, clubbing or cyanosis  Vascular: 2+ radial, DP/PT pulses B/L  Neurological: AAOx3; no focal deficits  Psych: normal affect  Patient appears cachectic and malnourished    MEDICATIONS  (STANDING):  amLODIPine   Tablet 2.5 milliGRAM(s) Oral at bedtime  chlorhexidine 2% Cloths 1 Application(s) Topical <User Schedule>  dextrose 5%. 1000 milliLiter(s) (50 mL/Hr) IV Continuous <Continuous>  dextrose 5%. 1000 milliLiter(s) (100 mL/Hr) IV Continuous <Continuous>  dextrose 50% Injectable 25 Gram(s) IV Push once  escitalopram 5 milliGRAM(s) Oral daily  estrogens  Cream 0.5 Gram(s) Vaginal once  glucagon  Injectable 1 milliGRAM(s) IntraMuscular once  heparin   Injectable 5000 Unit(s) SubCutaneous every 12 hours  insulin glargine Injectable (LANTUS) 7 Unit(s) SubCutaneous at bedtime  insulin lispro (ADMELOG) corrective regimen sliding scale   SubCutaneous at bedtime  insulin lispro (ADMELOG) corrective regimen sliding scale   SubCutaneous three times a day before meals  insulin lispro Injectable (ADMELOG) 3 Unit(s) SubCutaneous three times a day before meals  losartan 25 milliGRAM(s) Oral daily  magnesium sulfate  IVPB 1 Gram(s) IV Intermittent once  rivastigmine patch  4.6 mG/24 Hr(s) 1 Patch Transdermal every 24 hours    MEDICATIONS  (PRN):  dextrose Oral Gel 15 Gram(s) Oral once PRN Blood Glucose LESS THAN 70 milliGRAM(s)/deciliter          ALLERGIES:  Allergies    No Known Allergies    Intolerances        LABS:                          9.4    5.25  )-----------( 171      ( 01 Nov 2024 05:30 )             31.3   11-01    140  |  105  |  20  ----------------------------<  97  3.6   |  27  |  1.09    Ca    8.4      01 Nov 2024 05:30  Phos  2.5     11-01  Mg     1.8     11-01    TPro  5.2[L]  /  Alb  2.8[L]  /  TBili  0.3  /  DBili  x   /  AST  74[H]  /  ALT  40  /  AlkPhos  107  11-01

## 2024-11-02 NOTE — PROGRESS NOTE ADULT - TIME BILLING
. Bedside exam and interview. Reviewed labs and glucose. Insulin adjustment. Discussed plan of care with primary team. Documentation of encounter.

## 2024-11-02 NOTE — OCCUPATIONAL THERAPY INITIAL EVALUATION ADULT - DIAGNOSIS, OT EVAL
Pt admitted for DKA, presents with generalized deconditioning, cognitive deficits, and impaired balance.

## 2024-11-02 NOTE — PROGRESS NOTE ADULT - PROBLEM SELECTOR PLAN 1
- She appears to be more alert than previous reports.   - Glucose level decreased this morning to 58 mg/dL prior to breakfast. She later had a spike into the 200s mg/dL after having breakfast without insulin coverage.   - DECREASE lantus to 7 units at bedtime.  - START 2 units of lispro before meals.   - Continue with a low dose sliding scale before meals and at bedtime.   - Patient's fingerstick glucose goal is 100-180 mg/dL.    - Patient can follow up at discharge within me within 2 weeks at [facility], ([phone]). Appointment to be set up by office.

## 2024-11-02 NOTE — OCCUPATIONAL THERAPY INITIAL EVALUATION ADULT - PERTINENT HX OF CURRENT PROBLEM, REHAB EVAL
80yo F resident in [facility], w/ PMHx of dementia, osteoporosis (on Prolia), anxiety and depression, T1DM, HTN and history of locally advanced HARSHA pancreatic cancer (T2N2) diagnosed 2020 and now status-post Whipple and adjuvant chemotherapy - presenting to ED w/ elevated glucose levels, lethargy/disorientaion & generalized weakness. Of note, 2 wks ago pt started c/o genital pruritus, however unable to endorse if dysuria/frequency/urgency. UA performed at [facility] showed UTI,  tx'd with Bactrim outpatient however pt's confusion did not reside. History provided mostly by pt's cousin at bedside who states that pt has been having trouble w/ glucose control while at [facility], with few weeks-months elevated glucose readings (>300 per cousin) despite taking prescribed doses of basal/bolus insulin.   Patient seen and examined at bedside. Agitated and yelling to get out of bed because she needs to urinate. She denies any fever or chills but continuously keeps stating that she has to urinate. She denies any headaches or chest pain. Any additional ROS unable to be obtained given patient non-compliance to interview.

## 2024-11-02 NOTE — PROGRESS NOTE ADULT - SUBJECTIVE AND OBJECTIVE BOX
SUBJECTIVE / INTERVAL HPI: Patient was seen and examined this morning. She is now back to her baseline mental status (AAOx2). She couldn't remember what she had for dinner or breakfast. Her fingerstick glucose dropped to 58 mg/dL this morning and her premeal insulin was held. Her glucose increased subsequently to 230 mg/dL after eating breakfast without insulin coverage.     CAPILLARY BLOOD GLUCOSE & INSULIN RECEIVED  137 mg/dL (10-31 @ 23:34) -> 10 units of lantus.   117 mg/dL (11-01 @ 03:04) -> Ø   97 mg/dL (11-01 @ 07:18) -> Ø   101 mg/dL (11-01 @ 09:36) -> Ø   323 mg/dL (11-01 @ 12:56) -> 4 units of LSS.    179 mg/dL (11-01 @ 18:09) -> 1 unit of LSS.    169 mg/dL (11-01 @ 22:09) -> 8 units of lantus.    58 mg/dL (11-02 @ 09:00) -> Ø (Comment: FS 58. MD aware).   97 mg/dL (11-02 @ 09:40) -> Ø   230 mg/dL (11-02 @ 13:11)    REVIEW OF SYSTEMS  Limited due to patient's memory.  Constitutional:  Denied fever, chills.  Cardiovascular:  Denied chest pain or palpitations.  Neurologic:  (+) Confusion. No headache, dizziness, lightheadedness.    PHYSICAL EXAM  Vital Signs Last 24 Hrs  T(C): 36.2 (02 Nov 2024 06:30), Max: 36.8 (01 Nov 2024 21:53)  T(F): 97.2 (02 Nov 2024 06:30), Max: 98.3 (01 Nov 2024 21:53)  HR: 78 (02 Nov 2024 06:30) (73 - 78)  BP: 166/84 (02 Nov 2024 06:30) (144/84 - 166/84)  BP(mean): --  RR: 18 (02 Nov 2024 06:30) (18 - 18)  SpO2: 97% (02 Nov 2024 06:30) (96% - 97%)    Constitutional: Awake, alert, elderly woman, in no acute distress.   HEENT: Normocephalic, atraumatic, BHAVANI.  Respiratory: Lungs clear to ausculation bilaterally.   Cardiovascular: regular rhythm, normal S1 and S2, no audible murmurs.   GI: soft, non-tender, non-distended, bowel sounds present.  Extremities: No lower extremity edema.  Psychiatric: AAO x 2.    LABS  CBC - WBC/HGB/HTC/PLT: 5.25/9.4/31.3/171 (11-01-24)  BMP - Na/K/Cl/Bicarb/BUN/Cr/Gluc/AG/eGFR: 140/3.6/105/27/20/1.09/97/8/52 (11-01-24)  Ca - 8.4 (11-01-24)  Phos - 2.5 (11-01-24)  Mg - 1.8 (11-01-24)  LFT - Alb/Tprot/Tbili/Dbili/AlkPhos/ALT/AST: 2.8/--/0.3/--/107/40/74 (11-01-24)    MEDICATIONS  MEDICATIONS  (STANDING):  amLODIPine   Tablet 2.5 milliGRAM(s) Oral at bedtime  chlorhexidine 2% Cloths 1 Application(s) Topical <User Schedule>  dextrose 5%. 1000 milliLiter(s) (50 mL/Hr) IV Continuous <Continuous>  dextrose 5%. 1000 milliLiter(s) (100 mL/Hr) IV Continuous <Continuous>  dextrose 50% Injectable 25 Gram(s) IV Push once  escitalopram 5 milliGRAM(s) Oral daily  estrogens  Cream 0.5 Gram(s) Vaginal once  glucagon  Injectable 1 milliGRAM(s) IntraMuscular once  heparin   Injectable 5000 Unit(s) SubCutaneous every 12 hours  insulin glargine Injectable (LANTUS) 8 Unit(s) SubCutaneous at bedtime  insulin lispro (ADMELOG) corrective regimen sliding scale   SubCutaneous three times a day before meals  insulin lispro (ADMELOG) corrective regimen sliding scale   SubCutaneous at bedtime  insulin lispro Injectable (ADMELOG) 3 Unit(s) SubCutaneous three times a day before meals  losartan 25 milliGRAM(s) Oral daily  magnesium sulfate  IVPB 1 Gram(s) IV Intermittent once  rivastigmine patch  4.6 mG/24 Hr(s) 1 Patch Transdermal every 24 hours    MEDICATIONS  (PRN):  dextrose Oral Gel 15 Gram(s) Oral once PRN Blood Glucose LESS THAN 70 milliGRAM(s)/deciliter    ASSESSMENT / RECOMMENDATIONS  Ms. Blanco is a 79-year-old woman with type 1 diabetes mellitus, hypertension, dementia, anxiety, depression, osteoporosis (currently on Prolia), and a history of locally advanced pancreatic cancer (T2N2, diagnosed in 2020, now status-post Whipple procedure and adjuvant chemotherapy) who presented to the emergency department with elevated glucose levels, lethargy, disorientation, and generalized weakness. She was found to be in diabetic ketoacidosis, which has since resolved. Her hospitalization has been complicated by episodes of hypoglycemia. Endocrinology has been following for recommendations regarding management of diabetes.     A1C: 7.4 %  BUN: 20  Creatinine: 1.09  GFR: 52  Weight: 47.6

## 2024-11-02 NOTE — OCCUPATIONAL THERAPY INITIAL EVALUATION ADULT - PLANNED THERAPY INTERVENTIONS, OT EVAL
ADL retraining/IADL retraining/balance training/bed mobility training/cognitive, visual perceptual/motor coordination training/neuromuscular re-education/ROM/strengthening/transfer training

## 2024-11-03 LAB
GLUCOSE BLDC GLUCOMTR-MCNC: 111 MG/DL — HIGH (ref 70–99)
GLUCOSE BLDC GLUCOMTR-MCNC: 116 MG/DL — HIGH (ref 70–99)
GLUCOSE BLDC GLUCOMTR-MCNC: 149 MG/DL — HIGH (ref 70–99)
GLUCOSE BLDC GLUCOMTR-MCNC: 240 MG/DL — HIGH (ref 70–99)
GLUCOSE BLDC GLUCOMTR-MCNC: 55 MG/DL — LOW (ref 70–99)

## 2024-11-03 RX ORDER — INSULIN GLARGINE,HUM.REC.ANLOG 100/ML
5 VIAL (ML) SUBCUTANEOUS AT BEDTIME
Refills: 0 | Status: DISCONTINUED | OUTPATIENT
Start: 2024-11-03 | End: 2024-11-04

## 2024-11-03 RX ORDER — INSULIN GLARGINE,HUM.REC.ANLOG 100/ML
4 VIAL (ML) SUBCUTANEOUS AT BEDTIME
Refills: 0 | Status: DISCONTINUED | OUTPATIENT
Start: 2024-11-03 | End: 2024-11-03

## 2024-11-03 RX ORDER — INSULIN LISPRO 100/ML
2 VIAL (ML) SUBCUTANEOUS
Refills: 0 | Status: DISCONTINUED | OUTPATIENT
Start: 2024-11-03 | End: 2024-11-03

## 2024-11-03 RX ORDER — INSULIN LISPRO 100/ML
3 VIAL (ML) SUBCUTANEOUS
Refills: 0 | Status: DISCONTINUED | OUTPATIENT
Start: 2024-11-03 | End: 2024-11-04

## 2024-11-03 RX ADMIN — Medication 5 UNIT(S): at 22:15

## 2024-11-03 RX ADMIN — HEPARIN SODIUM 5000 UNIT(S): 10000 INJECTION INTRAVENOUS; SUBCUTANEOUS at 18:29

## 2024-11-03 RX ADMIN — LOSARTAN POTASSIUM 25 MILLIGRAM(S): 25 TABLET ORAL at 06:09

## 2024-11-03 RX ADMIN — Medication 2 UNIT(S): at 10:07

## 2024-11-03 RX ADMIN — Medication 3 UNIT(S): at 18:28

## 2024-11-03 RX ADMIN — HEPARIN SODIUM 5000 UNIT(S): 10000 INJECTION INTRAVENOUS; SUBCUTANEOUS at 06:09

## 2024-11-03 RX ADMIN — Medication 25 GRAM(S): at 09:25

## 2024-11-03 RX ADMIN — Medication 2 UNIT(S): at 13:14

## 2024-11-03 RX ADMIN — Medication 2.5 MILLIGRAM(S): at 21:44

## 2024-11-03 RX ADMIN — ESCITALOPRAM 5 MILLIGRAM(S): 10 TABLET, FILM COATED ORAL at 13:15

## 2024-11-03 NOTE — PROGRESS NOTE ADULT - PROBLEM SELECTOR PLAN 7
Fluids: S/p 2L NS, on /hr  Electrolytes: Replete as needed  Nutrition: NPO for now  PPx: Hep subcutaneous q12h  Dispo: [facility]

## 2024-11-03 NOTE — PROGRESS NOTE ADULT - NUTRITIONAL ASSESSMENT
This patient has been assessed with a concern for Malnutrition and has been determined to have a diagnosis/diagnoses of Moderate protein-calorie malnutrition.    This patient is being managed with:   Diet Regular-  Consistent Carbohydrate {Evening Snack} (CSTCHOSN)  Supplement Feeding Modality:  Oral  Glucerna Shake Cans or Servings Per Day:  1       Frequency:  Daily  Entered: Nov 1 2024  4:31PM  
This patient has been assessed with a concern for Malnutrition and has been determined to have a diagnosis/diagnoses of Moderate protein-calorie malnutrition.    This patient is being managed with:   Diet Regular-  Consistent Carbohydrate {Evening Snack} (CSTCHOSN)  Supplement Feeding Modality:  Oral  Glucerna Shake Cans or Servings Per Day:  1       Frequency:  Daily  Entered: Nov 1 2024  4:31PM

## 2024-11-03 NOTE — PROGRESS NOTE ADULT - ATTENDING COMMENTS
Patient was seen and examined at bedside on 11/3/2024 at 1130 am. Patient reports improvement of her symptoms, feels well. Denies abdominal pain, CP, pain, N/V. ROS Is otherwise negative. Vitals, labwork and pertinent imaging reviewed. Exam - NAD, alert, PERRLA, EOMI, MMM, supple neck, chest - CTA b/l, CV - rrr, s1s2, no m/r/g, abd - soft, NTND, + BS, ext - wwp, no edema, psych - normal affect, skin - no rash, patient appears cachectic and malnourished    Plan:  -C/w insulin (basal.bolus) as per Endocrinology recommendations, will decrease Lantus to 5 units, c/w pre meal 3 units  -PT/OT reevaluation for [facility] placement - plan for d/c in AM
gap closed  endo following    decision making high complexity
Patient was seen and examined at bedside on 11/1/2024 at 1130 am. Patient reports improvement of her symptoms, feels well. Denies abdominal pain, CP, pain, N/V. ROS Is otherwise negative. Vitals, labwork and pertinent imaging reviewed. Exam - NAD, alert, PERRLA, EOMI, MMM, supple neck, chest - CTA b/l, CV - rrr, s1s2, no m/r/g, abd - soft, NTND, + BS, ext - wwp, no edema, psych - normal affect, skin - no rash, patient appears cachectic and malnourished    Plan:  -C/w insulin (basal.bolus) as per Endocrinology recommendations  -Check AXR  -PT/OT reevaluation for [facility] placement
79-yo woman who resides in [facility] with HTN, type 1 DM, osteoporosis (on Prolia), dementia, and pancreatic CA (s/p Whipple and adjuvant chemo in 2020, currently PIEDAD) who was sent to the hospital because of marked hyperglycemia noted at the [facility], and was found to be in severe DKA of unknown etiology. No signs sx of infection. Remote history of UTI treated with bactrim, no fever In hospital.     DKA treated in MICU per protocol and started on basal insulin. course has been c/b mild hypoglycemia likely multifactorial from poor PO intake and renal clearance of Insulin. Endocrine following and decreased Lantus dose tonight to 10U. D5 gtt switched to D5/NS to provide hydration w/ glucose as PERCY on CKD likely pre-renal. Attempting to eat but needs encouragement.     PHYSICAL EXAM:    Constitutional: resting comfortably in bed; NAD  Head: NC/AT  Eyes: PERRL, anicteric sclera  ENT MMM  Respiratory: CTA B/L; no W/R/R, no retractions  Cardiac: +S1/S2; RRR; no M/R/G;   Gastrointestinal: abdomen soft, NT/ND; no rebound or guarding; +BSx4  Extremities: WWP, no clubbing or cyanosis; no peripheral edema  Musculoskeletal: NROM x4  Neurologic: Alert  Psychiatric: affect and characteristics of appearance, verbalizations, behaviors are appropriate      #DKA  #Hypoglycemia     Plan as above:   - Monitor Blood glucose  - Encourage PO intake   - OOBTC

## 2024-11-03 NOTE — PROGRESS NOTE ADULT - PROBLEM SELECTOR PLAN 1
- Glucose level decreased this morning to 55 mg/dL prior to breakfast.   - DECREASE lantus to 5 units at bedtime.  - INCREASE lispro to 3 units before meals.   - Continue with a low dose sliding scale before meals and at bedtime.   - Patient's fingerstick glucose goal is 100-180 mg/dL.    - Patient can follow up at discharge within me within 2 weeks at [facility], ([phone]). Appointment to be set up by office.

## 2024-11-03 NOTE — PROGRESS NOTE ADULT - PROBLEM SELECTOR PROBLEM 2
Acute kidney injury superimposed on CKD
Acute kidney injury superimposed on CKD
Debility
Acute kidney injury superimposed on CKD
Acute kidney injury superimposed on CKD

## 2024-11-03 NOTE — PROGRESS NOTE ADULT - PROBLEM SELECTOR PROBLEM 3
Type 1 diabetes mellitus
Type 1 diabetes mellitus
PERCY (acute kidney injury)
Type 1 diabetes mellitus
Type 1 diabetes mellitus

## 2024-11-03 NOTE — PROGRESS NOTE ADULT - PROBLEM SELECTOR PLAN 1
RESOLVED  Patient is currently on basal bolus regimen Lantus 7 units qHS, 2 units premeal    as per endo modify to 5 units lantus, 3 units premeal insulin

## 2024-11-03 NOTE — PROGRESS NOTE ADULT - PROBLEM SELECTOR PROBLEM 1
DKA (diabetic ketoacidosis)
Type 1 diabetes mellitus
DKA (diabetic ketoacidosis)
Type 1 diabetes mellitus
DKA (diabetic ketoacidosis)
Acute metabolic encephalopathy
DKA (diabetic ketoacidosis)

## 2024-11-03 NOTE — PROGRESS NOTE ADULT - PROBLEM SELECTOR PLAN 3
Hx Type 1 DM, on basal bolus insulin however doses unclear  Per SANA, patient on tresiba (insulin degludec) 10 units subQ every morning. Also on insulin lispro sliding scale and 7u subQ before meals    Patient has episodes of hypoglycemia post DKA requiring  amp of D50. Most likely in the setting of poor oral intake. Family members report that patient is only eating a few bites of each meal and does not ask for food. Patient also endorses lack of appetite.     Patient reporting improved appetite today 11/1. Nutrition consulted and has added 1 can of Glucerna to Carb Consistent diet. Also added Creon In the setting of the whipple procedure.     Endocrinology following, their reccs as of today (11/1) are as follows    - Lantus 8 units qhs   - 3 units lispro before meals  - Low insulin sliding scale for now before meals and bedtime  - Patient's fingerstick glucose goal is 100-180 mg/dL.    - Patient  can be discharged to facility on above recs.   - Follow up appt with CLIFTON Wise at [facility] 11/11 @ 10 am

## 2024-11-03 NOTE — PROGRESS NOTE ADULT - SUBJECTIVE AND OBJECTIVE BOX
***INCOMPLETE***    SUBJECTIVE / INTERVAL HPI: Patient was seen and examined this morning.     CAPILLARY BLOOD GLUCOSE & INSULIN RECEIVED  137 mg/dL (10-31 @ 23:34) -> 10 units of lantus.   117 mg/dL (11-01 @ 03:04) -> Ø   97 mg/dL (11-01 @ 07:18) -> Ø   101 mg/dL (11-01 @ 09:36) -> Ø   323 mg/dL (11-01 @ 12:56) -> 4 units of LSS.    179 mg/dL (11-01 @ 18:09) -> 1 unit of LSS.    169 mg/dL (11-01 @ 22:09) -> 8 units of lantus.    58 mg/dL (11-02 @ 09:00) -> Ø (Comment: FS 58. MD aware).   97 mg/dL (11-02 @ 09:40) -> Ø   230 mg/dL (11-02 @ 13:11) -> 3 units of lispro + 2 units of LSS.    213 mg/dL (11-02 @ 17:56) -> 3 unis of lispro + 2 unis of LSS.    108 mg/dL (11-02 @ 22:56) -> 7 units of lantus.    55 mg/dL (11-03 @ 09:06) -> Ø (Dextrose 50% IVP – 25 grams administered)   240 mg/dL (11-03 @ 09:50) -> 2 units of lispro.      REVIEW OF SYSTEMS  Limited due to patient's memory.  Constitutional:  Negative fever, chills or loss of appetite.  Eyes:  Negative blurry vision or double vision.  Cardiovascular:  Negative for chest pain or palpitations.  Respiratory:  Negative for cough, wheezing, or shortness of breath.    Gastrointestinal:  Negative for nausea, vomiting, diarrhea, constipation, or abdominal pain.  Genitourinary:  Negative frequency, urgency or dysuria.  Neurologic:  No headache, confusion, dizziness, lightheadedness.    PHYSICAL EXAM  Vital Signs Last 24 Hrs  T(C): 36.6 (03 Nov 2024 06:00), Max: 36.8 (02 Nov 2024 20:56)  T(F): 97.8 (03 Nov 2024 06:00), Max: 98.3 (02 Nov 2024 20:56)  HR: 80 (03 Nov 2024 06:00) (72 - 80)  BP: 140/72 (03 Nov 2024 06:00) (116/72 - 143/82)  BP(mean): --  RR: 17 (03 Nov 2024 06:00) (17 - 19)  SpO2: 96% (03 Nov 2024 06:00) (96% - 100%)    Parameters below as of 03 Nov 2024 06:00  Patient On (Oxygen Delivery Method): room air    Constitutional: Awake, alert, elderly woman, in no acute distress.   HEENT: Normocephalic, atraumatic, BHAVANI.  Respiratory: Lungs clear to ausculation bilaterally.   Cardiovascular: regular rhythm, normal S1 and S2, no audible murmurs.   GI: soft, non-tender, non-distended, bowel sounds present.  Extremities: No lower extremity edema.  Psychiatric: AAO x 2.    LABS  CBC - WBC/HGB/HTC/PLT: 5.25/9.4/31.3/171 (11-01-24)  BMP - Na/K/Cl/Bicarb/BUN/Cr/Gluc/AG/eGFR: 140/3.6/105/27/20/1.09/97/8/52 (11-01-24)  Ca - 8.4 (11-01-24)  Phos - 2.5 (11-01-24)  Mg - 1.8 (11-01-24)  LFT - Alb/Tprot/Tbili/Dbili/AlkPhos/ALT/AST: 2.8/--/0.3/--/107/40/74 (11-01-24)    MEDICATIONS  MEDICATIONS  (STANDING):  amLODIPine   Tablet 2.5 milliGRAM(s) Oral at bedtime  chlorhexidine 2% Cloths 1 Application(s) Topical <User Schedule>  dextrose 5%. 1000 milliLiter(s) (50 mL/Hr) IV Continuous <Continuous>  dextrose 5%. 1000 milliLiter(s) (100 mL/Hr) IV Continuous <Continuous>  dextrose 50% Injectable 25 Gram(s) IV Push once  escitalopram 5 milliGRAM(s) Oral daily  estrogens  Cream 0.5 Gram(s) Vaginal once  glucagon  Injectable 1 milliGRAM(s) IntraMuscular once  heparin   Injectable 5000 Unit(s) SubCutaneous every 12 hours  insulin glargine Injectable (LANTUS) 4 Unit(s) SubCutaneous at bedtime  insulin lispro (ADMELOG) corrective regimen sliding scale   SubCutaneous at bedtime  insulin lispro (ADMELOG) corrective regimen sliding scale   SubCutaneous three times a day before meals  insulin lispro Injectable (ADMELOG) 2 Unit(s) SubCutaneous three times a day before meals  losartan 25 milliGRAM(s) Oral daily  magnesium sulfate  IVPB 1 Gram(s) IV Intermittent once  rivastigmine patch  4.6 mG/24 Hr(s) 1 Patch Transdermal every 24 hours    MEDICATIONS  (PRN):  dextrose Oral Gel 15 Gram(s) Oral once PRN Blood Glucose LESS THAN 70 milliGRAM(s)/deciliter    ASSESSMENT / RECOMMENDATIONS  Ms. Blanco is a 79-year-old woman with type 1 diabetes mellitus, hypertension, dementia, anxiety, depression, osteoporosis (currently on Prolia), and a history of locally advanced pancreatic cancer (T2N2, diagnosed in 2020, now status-post Whipple procedure and adjuvant chemotherapy) who presented to the emergency department with elevated glucose levels, lethargy, disorientation, and generalized weakness. She was found to be in diabetic ketoacidosis, which has since resolved. Her hospitalization has been complicated by episodes of hypoglycemia. Endocrinology has been following for recommendations regarding management of diabetes.     A1C: 7.4 %  BUN: 20  Creatinine: 1.09  GFR: 52  Weight: 47.6 SUBJECTIVE / INTERVAL HPI: Patient was seen and examined this morning. She is back to her baseline mental status. Couldn't recall diet, says perhaps had [place] toast this morning. Per primary team the plan is for discharge tomorrow to rehab. She developed hypoglycemia overnight despite decreasing dose of lantus to 7 units at bedtime. Denies experiencing any symptoms this morning when her fingerstick was 55 mg/dL.     CAPILLARY BLOOD GLUCOSE & INSULIN RECEIVED  137 mg/dL (10-31 @ 23:34) -> 10 units of lantus.   117 mg/dL (11-01 @ 03:04) -> Ø   97 mg/dL (11-01 @ 07:18) -> Ø   101 mg/dL (11-01 @ 09:36) -> Ø   323 mg/dL (11-01 @ 12:56) -> 4 units of LSS.    179 mg/dL (11-01 @ 18:09) -> 1 unit of LSS.    169 mg/dL (11-01 @ 22:09) -> 8 units of lantus.    58 mg/dL (11-02 @ 09:00) -> Ø (Comment: FS 58. MD aware).   97 mg/dL (11-02 @ 09:40) -> Ø   230 mg/dL (11-02 @ 13:11) -> 3 units of lispro + 2 units of LSS.    213 mg/dL (11-02 @ 17:56) -> 3 unis of lispro + 2 unis of LSS.    108 mg/dL (11-02 @ 22:56) -> 7 units of lantus.    55 mg/dL (11-03 @ 09:06) -> Ø (Dextrose 50% IVP – 25 grams administered)   240 mg/dL (11-03 @ 09:50) -> 2 units of lispro.      REVIEW OF SYSTEMS  Constitutional:  Negative fever, chills or loss of appetite.  Eyes:  Negative blurry vision or double vision.  Cardiovascular:  Negative for chest pain or palpitations.  Respiratory:  Negative for cough, wheezing, or shortness of breath.    Gastrointestinal:  Negative for nausea, vomiting, diarrhea, constipation, or abdominal pain.  Genitourinary:  Negative frequency, urgency or dysuria.  Neurologic:  No headache, confusion, dizziness, lightheadedness.    PHYSICAL EXAM  Vital Signs Last 24 Hrs  T(C): 36.6 (03 Nov 2024 06:00), Max: 36.8 (02 Nov 2024 20:56)  T(F): 97.8 (03 Nov 2024 06:00), Max: 98.3 (02 Nov 2024 20:56)  HR: 80 (03 Nov 2024 06:00) (72 - 80)  BP: 140/72 (03 Nov 2024 06:00) (116/72 - 143/82)  BP(mean): --  RR: 17 (03 Nov 2024 06:00) (17 - 19)  SpO2: 96% (03 Nov 2024 06:00) (96% - 100%)    Parameters below as of 03 Nov 2024 06:00  Patient On (Oxygen Delivery Method): room air    Constitutional: Awake, alert, elderly woman, in no acute distress.   HEENT: Normocephalic, atraumatic, BHAVANI.  Respiratory: Lungs clear to ausculation bilaterally.   Cardiovascular: regular rhythm, normal S1 and S2, no audible murmurs.   GI: soft, non-tender, non-distended, bowel sounds present.  Extremities: No lower extremity edema.  Psychiatric: AAO x 2.    LABS  CBC - WBC/HGB/HTC/PLT: 5.25/9.4/31.3/171 (11-01-24)  BMP - Na/K/Cl/Bicarb/BUN/Cr/Gluc/AG/eGFR: 140/3.6/105/27/20/1.09/97/8/52 (11-01-24)  Ca - 8.4 (11-01-24)  Phos - 2.5 (11-01-24)  Mg - 1.8 (11-01-24)  LFT - Alb/Tprot/Tbili/Dbili/AlkPhos/ALT/AST: 2.8/--/0.3/--/107/40/74 (11-01-24)    MEDICATIONS  MEDICATIONS  (STANDING):  amLODIPine   Tablet 2.5 milliGRAM(s) Oral at bedtime  chlorhexidine 2% Cloths 1 Application(s) Topical <User Schedule>  dextrose 5%. 1000 milliLiter(s) (50 mL/Hr) IV Continuous <Continuous>  dextrose 5%. 1000 milliLiter(s) (100 mL/Hr) IV Continuous <Continuous>  dextrose 50% Injectable 25 Gram(s) IV Push once  escitalopram 5 milliGRAM(s) Oral daily  estrogens  Cream 0.5 Gram(s) Vaginal once  glucagon  Injectable 1 milliGRAM(s) IntraMuscular once  heparin   Injectable 5000 Unit(s) SubCutaneous every 12 hours  insulin glargine Injectable (LANTUS) 4 Unit(s) SubCutaneous at bedtime  insulin lispro (ADMELOG) corrective regimen sliding scale   SubCutaneous at bedtime  insulin lispro (ADMELOG) corrective regimen sliding scale   SubCutaneous three times a day before meals  insulin lispro Injectable (ADMELOG) 2 Unit(s) SubCutaneous three times a day before meals  losartan 25 milliGRAM(s) Oral daily  magnesium sulfate  IVPB 1 Gram(s) IV Intermittent once  rivastigmine patch  4.6 mG/24 Hr(s) 1 Patch Transdermal every 24 hours    MEDICATIONS  (PRN):  dextrose Oral Gel 15 Gram(s) Oral once PRN Blood Glucose LESS THAN 70 milliGRAM(s)/deciliter    ASSESSMENT / RECOMMENDATIONS  Ms. Blanco is a 79-year-old woman with type 1 diabetes mellitus, hypertension, dementia, anxiety, depression, osteoporosis (currently on Prolia), and a history of locally advanced pancreatic cancer (T2N2, diagnosed in 2020, now status-post Whipple procedure and adjuvant chemotherapy) who presented to the emergency department with elevated glucose levels, lethargy, disorientation, and generalized weakness. She was found to be in diabetic ketoacidosis, which has since resolved. Her hospitalization has been complicated by episodes of hypoglycemia. Endocrinology has been following for recommendations regarding management of diabetes.     A1C: 7.4 %  BUN: 20  Creatinine: 1.09  GFR: 52  Weight: 47.6

## 2024-11-03 NOTE — PROGRESS NOTE ADULT - PROBLEM SELECTOR PROBLEM 4
Uncontrolled type 1 diabetes mellitus with ketoacidosis
Hypertension

## 2024-11-03 NOTE — PROGRESS NOTE ADULT - SUBJECTIVE AND OBJECTIVE BOX
INTERVAL HPI/OVERNIGHT EVENTS:  O/N:  This morning: Patient was seen and examined at bedside.      VITAL SIGNS:  T(F): 97.8 (11-03-24 @ 06:00)  HR: 80 (11-03-24 @ 06:00)  BP: 140/72 (11-03-24 @ 06:00)  RR: 17 (11-03-24 @ 06:00)  SpO2: 96% (11-03-24 @ 06:00)  Wt(kg): --    PHYSICAL EXAM:    Constitutional: NAD  HEENT: PERRL, EOMI, sclera non-icteric, neck supple, trachea midline, no masses, no JVD, MMM, good dentition  Respiratory: CTA b/l, good air entry b/l, no wheezing, no rhonchi, no rales, without accessory muscle use and no intercostal retractions  Cardiovascular: RRR, normal S1S2, no M/R/G  Gastrointestinal: abdomen soft, NTND, no masses palpable, BS normal  Extremities: Warm, well perfused, pulses equal bilateral upper and lower extremities, no edema, no clubbing  Neurological: AAOx3, CN Grossly intact  Skin: Normal temperature, warm, dry    MEDICATIONS  (STANDING):  amLODIPine   Tablet 2.5 milliGRAM(s) Oral at bedtime  chlorhexidine 2% Cloths 1 Application(s) Topical <User Schedule>  dextrose 5%. 1000 milliLiter(s) (50 mL/Hr) IV Continuous <Continuous>  dextrose 5%. 1000 milliLiter(s) (100 mL/Hr) IV Continuous <Continuous>  dextrose 50% Injectable 25 Gram(s) IV Push once  escitalopram 5 milliGRAM(s) Oral daily  estrogens  Cream 0.5 Gram(s) Vaginal once  glucagon  Injectable 1 milliGRAM(s) IntraMuscular once  heparin   Injectable 5000 Unit(s) SubCutaneous every 12 hours  insulin glargine Injectable (LANTUS) 4 Unit(s) SubCutaneous at bedtime  insulin lispro (ADMELOG) corrective regimen sliding scale   SubCutaneous three times a day before meals  insulin lispro (ADMELOG) corrective regimen sliding scale   SubCutaneous at bedtime  insulin lispro Injectable (ADMELOG) 2 Unit(s) SubCutaneous three times a day before meals  losartan 25 milliGRAM(s) Oral daily  magnesium sulfate  IVPB 1 Gram(s) IV Intermittent once  rivastigmine patch  4.6 mG/24 Hr(s) 1 Patch Transdermal every 24 hours    MEDICATIONS  (PRN):  dextrose Oral Gel 15 Gram(s) Oral once PRN Blood Glucose LESS THAN 70 milliGRAM(s)/deciliter      Allergies    No Known Allergies    Intolerances        LABS:                      RADIOLOGY & ADDITIONAL TESTS:  Reviewed INTERVAL HPI/OVERNIGHT EVENTS:  O/N:  This morning: Patient was seen and examined at bedside.    as per endo modify to 5 units lantus, 3 units premeal insulin    VITAL SIGNS:  T(F): 97.8 (11-03-24 @ 06:00)  HR: 80 (11-03-24 @ 06:00)  BP: 140/72 (11-03-24 @ 06:00)  RR: 17 (11-03-24 @ 06:00)  SpO2: 96% (11-03-24 @ 06:00)  Wt(kg): --    PHYSICAL EXAM:    as per prior note    MEDICATIONS  (STANDING):  amLODIPine   Tablet 2.5 milliGRAM(s) Oral at bedtime  chlorhexidine 2% Cloths 1 Application(s) Topical <User Schedule>  dextrose 5%. 1000 milliLiter(s) (50 mL/Hr) IV Continuous <Continuous>  dextrose 5%. 1000 milliLiter(s) (100 mL/Hr) IV Continuous <Continuous>  dextrose 50% Injectable 25 Gram(s) IV Push once  escitalopram 5 milliGRAM(s) Oral daily  estrogens  Cream 0.5 Gram(s) Vaginal once  glucagon  Injectable 1 milliGRAM(s) IntraMuscular once  heparin   Injectable 5000 Unit(s) SubCutaneous every 12 hours  insulin glargine Injectable (LANTUS) 4 Unit(s) SubCutaneous at bedtime  insulin lispro (ADMELOG) corrective regimen sliding scale   SubCutaneous three times a day before meals  insulin lispro (ADMELOG) corrective regimen sliding scale   SubCutaneous at bedtime  insulin lispro Injectable (ADMELOG) 2 Unit(s) SubCutaneous three times a day before meals  losartan 25 milliGRAM(s) Oral daily  magnesium sulfate  IVPB 1 Gram(s) IV Intermittent once  rivastigmine patch  4.6 mG/24 Hr(s) 1 Patch Transdermal every 24 hours    MEDICATIONS  (PRN):  dextrose Oral Gel 15 Gram(s) Oral once PRN Blood Glucose LESS THAN 70 milliGRAM(s)/deciliter    Labs - none

## 2024-11-03 NOTE — PROGRESS NOTE ADULT - TIME BILLING
35 minutes spent on total encounter. The necessity of the time spent during the encounter on this date of service was due to:     Bedside exam and interview. Reviewed labs and glucose. Insulin adjustment. Discussed plan of care with primary team. Documentation of encounter.

## 2024-11-03 NOTE — PROGRESS NOTE ADULT - PROBLEM SELECTOR PLAN 4
Hx HTN, home meds amlodipine 2.5mg qd, Losartan 25mg qd   - continue home meds
Hx HTN, home meds amlodipine 2.5mg qd, Losartan 25mg qd   - continue home meds
.  -patient w/ 50+y hx of DM1, on insulin therapy.  -home insulin regimen -- TRESIBA 8u daily + lispro 5u TID + sliding scale TID  -blood sugars have been harder to control due to cognitive impairment and trouble controlling food intake  -DKA improved after ICU management  -appreciate endocrinology consult
Hx HTN, home meds amlodipine 2.5mg qd, Losartan 25mg qd   - continue home meds
Hx HTN, home meds amlodipine 2.5mg qd, Losartan 25mg qd   - continue home meds

## 2024-11-03 NOTE — PROGRESS NOTE ADULT - PROBLEM SELECTOR PROBLEM 6
Encounter for palliative care
Anxiety and depression

## 2024-11-04 ENCOUNTER — TRANSCRIPTION ENCOUNTER (OUTPATIENT)
Age: 79
End: 2024-11-04

## 2024-11-04 VITALS
TEMPERATURE: 98 F | HEART RATE: 78 BPM | SYSTOLIC BLOOD PRESSURE: 132 MMHG | DIASTOLIC BLOOD PRESSURE: 78 MMHG | OXYGEN SATURATION: 95 % | RESPIRATION RATE: 18 BRPM

## 2024-11-04 LAB
CULTURE RESULTS: SIGNIFICANT CHANGE UP
CULTURE RESULTS: SIGNIFICANT CHANGE UP
GLUCOSE BLDC GLUCOMTR-MCNC: 218 MG/DL — HIGH (ref 70–99)
GLUCOSE BLDC GLUCOMTR-MCNC: 348 MG/DL — HIGH (ref 70–99)
GLUCOSE BLDC GLUCOMTR-MCNC: 64 MG/DL — LOW (ref 70–99)
SPECIMEN SOURCE: SIGNIFICANT CHANGE UP
SPECIMEN SOURCE: SIGNIFICANT CHANGE UP

## 2024-11-04 RX ORDER — INSULIN GLARGINE,HUM.REC.ANLOG 100/ML
3 VIAL (ML) SUBCUTANEOUS
Qty: 0 | Refills: 0 | DISCHARGE
Start: 2024-11-04

## 2024-11-04 RX ORDER — INSULIN LISPRO 100/ML
7 VIAL (ML) SUBCUTANEOUS
Refills: 0 | DISCHARGE

## 2024-11-04 RX ORDER — INSULIN LISPRO 100/ML
0 VIAL (ML) SUBCUTANEOUS
Refills: 0 | DISCHARGE

## 2024-11-04 RX ORDER — INSULIN LISPRO 100/ML
3 VIAL (ML) SUBCUTANEOUS
Qty: 0 | Refills: 0 | DISCHARGE
Start: 2024-11-04

## 2024-11-04 RX ORDER — INSULIN DEGLUDEC 100 U/ML
10 INJECTION, SOLUTION SUBCUTANEOUS
Refills: 0 | DISCHARGE

## 2024-11-04 RX ORDER — SULFAMETHOXAZOLE/TRIMETHOPRIM 800-160 MG
1 TABLET ORAL
Refills: 0 | DISCHARGE

## 2024-11-04 RX ADMIN — LOSARTAN POTASSIUM 25 MILLIGRAM(S): 25 TABLET ORAL at 07:08

## 2024-11-04 RX ADMIN — HEPARIN SODIUM 5000 UNIT(S): 10000 INJECTION INTRAVENOUS; SUBCUTANEOUS at 07:08

## 2024-11-04 RX ADMIN — Medication 3 UNIT(S): at 13:04

## 2024-11-04 RX ADMIN — ESCITALOPRAM 5 MILLIGRAM(S): 10 TABLET, FILM COATED ORAL at 13:21

## 2024-11-04 RX ADMIN — Medication 4: at 13:04

## 2024-11-04 NOTE — DISCHARGE NOTE NURSING/CASE MANAGEMENT/SOCIAL WORK - PATIENT PORTAL LINK FT
You can access the FollowMyHealth Patient Portal offered by [facility] by registering at the following website: [email or web address]. By joining [facility]’s FollowMyHealth portal, you will also be able to view your health information using other applications (apps) compatible with our system.

## 2024-11-04 NOTE — DISCHARGE NOTE NURSING/CASE MANAGEMENT/SOCIAL WORK - NSDCVIVACCINE_GEN_ALL_CORE_FT
Tdap; 01-Aug-2024 04:32; Zeferino feldman (RN); [employer]; [id number] (Exp. Date: 01-Aug-2024); IntraMuscular; Deltoid Left.; 0.5 milliLiter(s); VIS (VIS Published: 09-May-2013, VIS Presented: 01-Aug-2024);

## 2024-11-04 NOTE — PROGRESS NOTE ADULT - PROVIDER SPECIALTY LIST ADULT
Endocrinology
Palliative Care
MICU
Endocrinology
Internal Medicine
Hospitalist

## 2024-11-04 NOTE — DISCHARGE NOTE NURSING/CASE MANAGEMENT/SOCIAL WORK - FINANCIAL ASSISTANCE
[facility] provides services at a reduced cost to those who are determined to be eligible through [facility]’s financial assistance program. Information regarding [facility]’s financial assistance program can be found by going to [email or web address] or by calling [phone].

## 2024-11-04 NOTE — PROGRESS NOTE ADULT - SUBJECTIVE AND OBJECTIVE BOX
SUBJECTIVE / INTERVAL HPI: Patient was seen and examined this morning.     Overnight events: Episodes of hypoglycemia     CAPILLARY BLOOD GLUCOSE & INSULIN RECEIVED  108 mg/dL (11-02 @ 22:56 lantus 7)  55 mg/dL (11-03 @ 09:06)  dextrose  240 mg/dL (11-03 @ 09:50)  + 2  116 mg/dL (11-03 @ 12:51) + 2  149 mg/dL (11-03 @ 17:49) + 2  111 mg/dL (11-03 @ 21:53) lantus 5   64 mg/dL (11-04 @ 09:13) X  218 mg/dL (11-04 @ 10:26)      REVIEW OF SYSTEMS  Constitutional:  Negative fever, chills   Cardiovascular:  Negative for chest pain or palpitations.  Respiratory:  Negative for cough, wheezing, or shortness of breath.    Gastrointestinal:  Negative for nausea, vomiting, diarrhea, constipation, or abdominal pain.  Genitourinary:  Negative frequency, urgency or dysuria.  Neurologic:  No headache, confusion, dizziness, lightheadedness.    PHYSICAL EXAM  Vital Signs Last 24 Hrs  T(C): 36.8 (04 Nov 2024 10:19), Max: 37.1 (03 Nov 2024 13:10)  T(F): 98.3 (04 Nov 2024 10:19), Max: 98.8 (03 Nov 2024 13:10)  HR: 78 (04 Nov 2024 10:19) (72 - 79)  BP: 132/78 (04 Nov 2024 10:19) (108/71 - 138/81)  BP(mean): 100 (04 Nov 2024 07:06) (100 - 100)  RR: 18 (04 Nov 2024 10:19) (17 - 18)  SpO2: 95% (04 Nov 2024 10:19) (95% - 98%)    Parameters below as of 04 Nov 2024 10:19  Patient On (Oxygen Delivery Method): room air        Constitutional: Awake, alert, in no acute distress.   HEENT: Normocephalic, atraumatic, BHAVANI.  Respiratory: Lungs clear to ausculation bilaterally.   Cardiovascular: regular rhythm, normal S1 and S2, no audible murmurs.   GI: soft, non-tender, non-distended, bowel sounds present.  Extremities: No lower extremity edema.     LABS  CBC - WBC/HGB/HTC/PLT: 5.25/9.4/31.3/171 (11-01-24)  BMP - Na/K/Cl/Bicarb/BUN/Cr/Gluc/AG/eGFR: 140/3.6/105/27/20/1.09/97/8/52 (11-01-24)  Ca - 8.4 (11-01-24)  Phos - 2.5 (11-01-24)  Mg - 1.8 (11-01-24)  LFT - Alb/Tprot/Tbili/Dbili/AlkPhos/ALT/AST: 2.8/--/0.3/--/107/40/74 (11-01-24)          11-03-24 @ 07:01  -  11-04-24 @ 07:00  --------------------------------------------------------  IN: 0 mL / OUT: 300 mL / NET: -300 mL        MEDICATIONS  MEDICATIONS  (STANDING):  amLODIPine   Tablet 2.5 milliGRAM(s) Oral at bedtime  chlorhexidine 2% Cloths 1 Application(s) Topical <User Schedule>  dextrose 5%. 1000 milliLiter(s) (100 mL/Hr) IV Continuous <Continuous>  dextrose 5%. 1000 milliLiter(s) (50 mL/Hr) IV Continuous <Continuous>  dextrose 50% Injectable 25 Gram(s) IV Push once  escitalopram 5 milliGRAM(s) Oral daily  estrogens  Cream 0.5 Gram(s) Vaginal once  glucagon  Injectable 1 milliGRAM(s) IntraMuscular once  heparin   Injectable 5000 Unit(s) SubCutaneous every 12 hours  insulin glargine Injectable (LANTUS) 5 Unit(s) SubCutaneous at bedtime  insulin lispro (ADMELOG) corrective regimen sliding scale   SubCutaneous three times a day before meals  insulin lispro (ADMELOG) corrective regimen sliding scale   SubCutaneous at bedtime  insulin lispro Injectable (ADMELOG) 3 Unit(s) SubCutaneous three times a day before meals  losartan 25 milliGRAM(s) Oral daily  magnesium sulfate  IVPB 1 Gram(s) IV Intermittent once  rivastigmine patch  4.6 mG/24 Hr(s) 1 Patch Transdermal every 24 hours    MEDICATIONS  (PRN):  dextrose Oral Gel 15 Gram(s) Oral once PRN Blood Glucose LESS THAN 70 milliGRAM(s)/deciliter    ASSESSMENT / RECOMMENDATIONS  Ms. Blanco is a 79-year-old woman with type 1 diabetes mellitus, hypertension, dementia, anxiety, depression, osteoporosis (currently on Prolia), and a history of locally advanced pancreatic cancer (T2N2, diagnosed in 2020, now status-post Whipple procedure and adjuvant chemotherapy) who presented to the emergency department with elevated glucose levels, lethargy, disorientation, and generalized weakness. She was found to be in diabetic ketoacidosis, which has since resolved. Her hospitalization has been complicated by episodes of hypoglycemia. Endocrinology has been following for recommendations regarding management of diabetes.     A1C: 7.4 %  Creatinine: 1.09, GFR: 52  Weight: 47.6    # Type 1 diabetes mellitus.   - Glucose level decreased this morning to 64 mg/dL prior to breakfast. Lunch time highs likely 2/2 to dextrose/ juice given for fasting am lows.   - DECREASE lantus to 3 units at bedtime.  - c/w lispro to 3 units before meals.   - Continue with a low dose sliding scale before meals and at bedtime.   - Patient's fingerstick glucose goal is 100-180 mg/dL.    - Discharge Recs: Can be discharge on lantus 3 at bedtime and lispro 3 before meals with low insulin sliding scale.   - Patient can follow up at discharge within me within 2 weeks at [facility], ([phone]). Appointment to be set up by office.    Case discussed with Dr. Molina. Primary team updated.       Lakeshia Aleman   Endocrinology Fellow    Service Pager: [phone]

## 2024-11-04 NOTE — CHART NOTE - NSCHARTNOTEFT_GEN_A_CORE
Endocrine following for Diabetes management. Patient is eating better but still having am lows likely too much basal.     213 mg/dL (11-02 @ 17:56)  77 mg/dL (11-02 @ 22:08)  108 mg/dL (11-02 @ 22:56)  55 mg/dL (11-03 @ 09:06)  240 mg/dL (11-03 @ 09:50)  116 mg/dL (11-03 @ 12:51)  149 mg/dL (11-03 @ 17:49)  111 mg/dL (11-03 @ 21:53)  64 mg/dL (11-04 @ 09:13)  218 mg/dL (11-04 @ 10:26)  348 mg/dL (11-04 @ 12:35)      # Type 1 diabetes mellitus.   - Glucose level decreased this morning to 64 mg/dL prior to breakfast. Lunch time highs likely 2/2 to dextrose/ juice given for fasting am lows.   - DECREASE lantus to 3 units at bedtime.  - c/w lispro to 3 units before meals.   - Continue with a low dose sliding scale before meals and at bedtime.   - Patient's fingerstick glucose goal is 100-180 mg/dL.    - Discharge Recs: Can be discharge on lantus 3 at bedtime and lispro 3 before meals with low insulin sliding scale.   - Patient can follow up at discharge within me within 2 weeks at [facility], ([phone]). Appointment to be set up by office.    Case discussed with Dr. Sabillon. Primary team updated.       Lakeshia Aleman   Endocrinology Fellow    Service Pager: [phone]

## 2024-11-11 ENCOUNTER — APPOINTMENT (OUTPATIENT)
Dept: ENDOCRINOLOGY | Facility: CLINIC | Age: 79
End: 2024-11-11

## 2024-11-11 DIAGNOSIS — E10.10 TYPE 1 DIABETES MELLITUS WITH KETOACIDOSIS WITHOUT COMA: ICD-10-CM

## 2024-11-11 DIAGNOSIS — F41.8 OTHER SPECIFIED ANXIETY DISORDERS: ICD-10-CM

## 2024-11-11 DIAGNOSIS — Z79.4 LONG TERM (CURRENT) USE OF INSULIN: ICD-10-CM

## 2024-11-11 DIAGNOSIS — F03.90 UNSPECIFIED DEMENTIA, UNSPECIFIED SEVERITY, WITHOUT BEHAVIORAL DISTURBANCE, PSYCHOTIC DISTURBANCE, MOOD DISTURBANCE, AND ANXIETY: ICD-10-CM

## 2024-11-11 DIAGNOSIS — N18.9 CHRONIC KIDNEY DISEASE, UNSPECIFIED: ICD-10-CM

## 2024-11-11 DIAGNOSIS — Z85.07 PERSONAL HISTORY OF MALIGNANT NEOPLASM OF PANCREAS: ICD-10-CM

## 2024-11-11 DIAGNOSIS — R73.9 HYPERGLYCEMIA, UNSPECIFIED: ICD-10-CM

## 2024-11-11 DIAGNOSIS — E55.9 VITAMIN D DEFICIENCY, UNSPECIFIED: ICD-10-CM

## 2024-11-11 DIAGNOSIS — G93.41 METABOLIC ENCEPHALOPATHY: ICD-10-CM

## 2024-11-11 DIAGNOSIS — Z66 DO NOT RESUSCITATE: ICD-10-CM

## 2024-11-11 DIAGNOSIS — E44.0 MODERATE PROTEIN-CALORIE MALNUTRITION: ICD-10-CM

## 2024-11-11 DIAGNOSIS — E10.22 TYPE 1 DIABETES MELLITUS WITH DIABETIC CHRONIC KIDNEY DISEASE: ICD-10-CM

## 2024-11-11 DIAGNOSIS — N17.9 ACUTE KIDNEY FAILURE, UNSPECIFIED: ICD-10-CM

## 2024-11-11 DIAGNOSIS — E10.649 TYPE 1 DIABETES MELLITUS WITH HYPOGLYCEMIA WITHOUT COMA: ICD-10-CM

## 2024-11-11 DIAGNOSIS — M81.0 AGE-RELATED OSTEOPOROSIS WITHOUT CURRENT PATHOLOGICAL FRACTURE: ICD-10-CM

## 2024-11-11 DIAGNOSIS — I12.9 HYPERTENSIVE CHRONIC KIDNEY DISEASE WITH STAGE 1 THROUGH STAGE 4 CHRONIC KIDNEY DISEASE, OR UNSPECIFIED CHRONIC KIDNEY DISEASE: ICD-10-CM

## 2024-11-15 ENCOUNTER — APPOINTMENT (OUTPATIENT)
Dept: ENDOCRINOLOGY | Facility: CLINIC | Age: 79
End: 2024-11-15

## 2024-11-18 ENCOUNTER — NON-APPOINTMENT (OUTPATIENT)
Age: 79
End: 2024-11-18

## 2024-11-19 ENCOUNTER — NON-APPOINTMENT (OUTPATIENT)
Age: 79
End: 2024-11-19

## 2024-11-20 ENCOUNTER — NON-APPOINTMENT (OUTPATIENT)
Age: 79
End: 2024-11-20

## 2024-11-26 ENCOUNTER — NON-APPOINTMENT (OUTPATIENT)
Age: 79
End: 2024-11-26

## 2024-12-02 ENCOUNTER — OUTPATIENT (OUTPATIENT)
Dept: OUTPATIENT SERVICES | Facility: HOSPITAL | Age: 79
LOS: 1 days | End: 2024-12-02
Payer: COMMERCIAL

## 2024-12-02 ENCOUNTER — APPOINTMENT (OUTPATIENT)
Dept: INFUSION THERAPY | Facility: CLINIC | Age: 79
End: 2024-12-02

## 2024-12-02 VITALS
WEIGHT: 108.03 LBS | TEMPERATURE: 97 F | HEIGHT: 59 IN | DIASTOLIC BLOOD PRESSURE: 75 MMHG | SYSTOLIC BLOOD PRESSURE: 137 MMHG | RESPIRATION RATE: 18 BRPM | HEART RATE: 65 BPM

## 2024-12-02 DIAGNOSIS — M81.0 AGE-RELATED OSTEOPOROSIS WITHOUT CURRENT PATHOLOGICAL FRACTURE: ICD-10-CM

## 2024-12-02 DIAGNOSIS — Z90.410 ACQUIRED TOTAL ABSENCE OF PANCREAS: Chronic | ICD-10-CM

## 2024-12-02 RX ORDER — DENOSUMAB 60 MG/ML
60 INJECTION SUBCUTANEOUS ONCE
Refills: 0 | Status: COMPLETED | OUTPATIENT
Start: 2024-12-02 | End: 2024-12-02

## 2024-12-02 RX ADMIN — DENOSUMAB 60 MILLIGRAM(S): 60 INJECTION SUBCUTANEOUS at 14:10

## 2024-12-09 ENCOUNTER — APPOINTMENT (OUTPATIENT)
Dept: ENDOCRINOLOGY | Facility: CLINIC | Age: 79
End: 2024-12-09

## 2024-12-09 VITALS
BODY MASS INDEX: 20 KG/M2 | DIASTOLIC BLOOD PRESSURE: 73 MMHG | WEIGHT: 99 LBS | HEART RATE: 70 BPM | SYSTOLIC BLOOD PRESSURE: 120 MMHG

## 2024-12-09 DIAGNOSIS — E10.9 TYPE 1 DIABETES MELLITUS W/OUT COMPLICATIONS: ICD-10-CM

## 2024-12-09 LAB
GLUCOSE BLDC GLUCOMTR-MCNC: 270
HBA1C MFR BLD HPLC: 7.1

## 2024-12-09 PROCEDURE — G2211 COMPLEX E/M VISIT ADD ON: CPT | Mod: NC

## 2024-12-09 PROCEDURE — 82962 GLUCOSE BLOOD TEST: CPT

## 2024-12-09 PROCEDURE — 83036 HEMOGLOBIN GLYCOSYLATED A1C: CPT | Mod: QW

## 2024-12-09 PROCEDURE — 99214 OFFICE O/P EST MOD 30 MIN: CPT

## 2024-12-09 RX ORDER — BLOOD-GLUCOSE SENSOR
EACH MISCELLANEOUS
Qty: 2 | Refills: 3 | Status: ACTIVE | COMMUNITY
Start: 2024-12-09 | End: 1900-01-01

## 2025-02-06 ENCOUNTER — APPOINTMENT (OUTPATIENT)
Dept: INTERNAL MEDICINE | Facility: CLINIC | Age: 80
End: 2025-02-06

## 2025-02-10 ENCOUNTER — APPOINTMENT (OUTPATIENT)
Dept: INTERNAL MEDICINE | Facility: CLINIC | Age: 80
End: 2025-02-10
Payer: COMMERCIAL

## 2025-02-10 ENCOUNTER — LABORATORY RESULT (OUTPATIENT)
Age: 80
End: 2025-02-10

## 2025-02-10 ENCOUNTER — APPOINTMENT (OUTPATIENT)
Dept: ENDOCRINOLOGY | Facility: CLINIC | Age: 80
End: 2025-02-10
Payer: COMMERCIAL

## 2025-02-10 VITALS
BODY MASS INDEX: 20.16 KG/M2 | WEIGHT: 100 LBS | DIASTOLIC BLOOD PRESSURE: 70 MMHG | HEART RATE: 70 BPM | SYSTOLIC BLOOD PRESSURE: 113 MMHG | HEIGHT: 59 IN

## 2025-02-10 VITALS
HEIGHT: 59 IN | OXYGEN SATURATION: 97 % | WEIGHT: 100 LBS | BODY MASS INDEX: 20.16 KG/M2 | DIASTOLIC BLOOD PRESSURE: 67 MMHG | SYSTOLIC BLOOD PRESSURE: 110 MMHG | TEMPERATURE: 97.7 F | HEART RATE: 74 BPM

## 2025-02-10 DIAGNOSIS — Z82.49 FAMILY HISTORY OF ISCHEMIC HEART DISEASE AND OTHER DISEASES OF THE CIRCULATORY SYSTEM: ICD-10-CM

## 2025-02-10 DIAGNOSIS — I10 ESSENTIAL (PRIMARY) HYPERTENSION: ICD-10-CM

## 2025-02-10 DIAGNOSIS — M81.0 AGE-RELATED OSTEOPOROSIS W/OUT CURRENT PATHOLOGICAL FRACTURE: ICD-10-CM

## 2025-02-10 DIAGNOSIS — E10.9 TYPE 1 DIABETES MELLITUS W/OUT COMPLICATIONS: ICD-10-CM

## 2025-02-10 DIAGNOSIS — R35.0 FREQUENCY OF MICTURITION: ICD-10-CM

## 2025-02-10 PROCEDURE — 99215 OFFICE O/P EST HI 40 MIN: CPT

## 2025-02-10 PROCEDURE — 36415 COLL VENOUS BLD VENIPUNCTURE: CPT

## 2025-02-10 PROCEDURE — 99397 PER PM REEVAL EST PAT 65+ YR: CPT

## 2025-02-10 PROCEDURE — G2211 COMPLEX E/M VISIT ADD ON: CPT | Mod: NC

## 2025-02-10 RX ORDER — INSULIN LISPRO 100 [IU]/ML
100 INJECTION, SOLUTION INTRAVENOUS; SUBCUTANEOUS
Qty: 3 | Refills: 0 | Status: ACTIVE | COMMUNITY
Start: 2024-08-15

## 2025-02-10 RX ORDER — FLUCONAZOLE 100 MG/1
100 TABLET ORAL
Qty: 6 | Refills: 0 | Status: ACTIVE | COMMUNITY
Start: 2024-12-16

## 2025-02-11 ENCOUNTER — NON-APPOINTMENT (OUTPATIENT)
Age: 80
End: 2025-02-11

## 2025-02-12 ENCOUNTER — NON-APPOINTMENT (OUTPATIENT)
Age: 80
End: 2025-02-12

## 2025-02-12 RX ORDER — AMLODIPINE BESYLATE 2.5 MG/1
2.5 TABLET ORAL
Qty: 90 | Refills: 0 | Status: DISCONTINUED | COMMUNITY
Start: 2025-01-31 | End: 2025-02-12

## 2025-02-12 RX ORDER — INSULIN DEGLUDEC INJECTION 100 U/ML
100 INJECTION, SOLUTION SUBCUTANEOUS
Qty: 3 | Refills: 0 | Status: DISCONTINUED | COMMUNITY
Start: 2024-12-09 | End: 2025-02-12

## 2025-02-12 RX ORDER — INSULIN LISPRO-AABC 100 [IU]/ML
100 INJECTION, SOLUTION SUBCUTANEOUS
Qty: 15 | Refills: 0 | Status: DISCONTINUED | COMMUNITY
Start: 2024-12-09 | End: 2025-02-12

## 2025-02-12 RX ORDER — SULFAMETHOXAZOLE AND TRIMETHOPRIM 400; 80 MG/1; MG/1
400-80 TABLET ORAL
Qty: 28 | Refills: 0 | Status: DISCONTINUED | COMMUNITY
Start: 2024-12-16 | End: 2025-02-12

## 2025-02-12 RX ORDER — LOSARTAN POTASSIUM 25 MG/1
25 TABLET, FILM COATED ORAL DAILY
Qty: 90 | Refills: 3 | Status: ACTIVE | COMMUNITY
Start: 2025-01-31

## 2025-02-12 RX ORDER — RIVASTIGMINE 4.6 MG/24H
4.6 PATCH, EXTENDED RELEASE TRANSDERMAL
Qty: 90 | Refills: 0 | Status: DISCONTINUED | COMMUNITY
Start: 2025-01-31 | End: 2025-02-12

## 2025-02-12 RX ORDER — ESCITALOPRAM OXALATE 5 MG/1
5 TABLET ORAL
Qty: 90 | Refills: 0 | Status: DISCONTINUED | COMMUNITY
Start: 2025-01-31 | End: 2025-02-12

## 2025-02-13 LAB — HBA1C MFR BLD HPLC: 6.4

## 2025-02-25 DIAGNOSIS — R26.9 UNSPECIFIED ABNORMALITIES OF GAIT AND MOBILITY: ICD-10-CM

## 2025-02-25 DIAGNOSIS — R89.9 UNSPECIFIED ABNORMAL FINDING IN SPECIMENS FROM OTHER ORGANS, SYSTEMS AND TISSUES: ICD-10-CM

## 2025-02-25 DIAGNOSIS — M72.0 PALMAR FASCIAL FIBROMATOSIS [DUPUYTREN]: ICD-10-CM

## 2025-03-04 ENCOUNTER — LABORATORY RESULT (OUTPATIENT)
Age: 80
End: 2025-03-04

## 2025-03-07 DIAGNOSIS — R39.15 URGENCY OF URINATION: ICD-10-CM

## 2025-03-07 RX ORDER — CEPHALEXIN 500 MG/1
500 CAPSULE ORAL
Qty: 10 | Refills: 0 | Status: ACTIVE | COMMUNITY
Start: 2025-03-07 | End: 1900-01-01

## 2025-03-17 DIAGNOSIS — R39.15 URGENCY OF URINATION: ICD-10-CM

## 2025-03-18 ENCOUNTER — LABORATORY RESULT (OUTPATIENT)
Age: 80
End: 2025-03-18

## 2025-03-20 DIAGNOSIS — I71.40 ABDOMINAL AORTIC ANEURYSM, WITHOUT RUPTURE, UNSPECIFIED: ICD-10-CM

## 2025-03-26 DIAGNOSIS — N39.0 URINARY TRACT INFECTION, SITE NOT SPECIFIED: ICD-10-CM

## 2025-03-26 PROBLEM — E11.9 TYPE 2 DIABETES MELLITUS WITHOUT COMPLICATIONS: Chronic | Status: ACTIVE | Noted: 2024-08-01

## 2025-03-26 PROBLEM — C25.9 MALIGNANT NEOPLASM OF PANCREAS, UNSPECIFIED: Chronic | Status: ACTIVE | Noted: 2024-08-01

## 2025-03-26 PROBLEM — I10 ESSENTIAL (PRIMARY) HYPERTENSION: Chronic | Status: ACTIVE | Noted: 2024-08-01

## 2025-03-26 LAB
APPEARANCE: ABNORMAL
BILIRUBIN URINE: NEGATIVE
BLOOD URINE: NEGATIVE
COLOR: YELLOW
GLUCOSE QUALITATIVE U: NEGATIVE MG/DL
KETONES URINE: NEGATIVE MG/DL
LEUKOCYTE ESTERASE URINE: ABNORMAL
NITRITE URINE: POSITIVE
PH URINE: 5.5
PROTEIN URINE: NORMAL MG/DL
SPECIFIC GRAVITY URINE: 1.02
UROBILINOGEN URINE: 0.2 MG/DL

## 2025-03-26 RX ORDER — CEFPODOXIME PROXETIL 100 MG/1
100 TABLET, FILM COATED ORAL
Qty: 14 | Refills: 0 | Status: ACTIVE | COMMUNITY
Start: 2025-03-26 | End: 1900-01-01

## 2025-03-27 DIAGNOSIS — M81.0 AGE-RELATED OSTEOPOROSIS W/OUT CURRENT PATHOLOGICAL FRACTURE: ICD-10-CM

## 2025-03-31 ENCOUNTER — APPOINTMENT (OUTPATIENT)
Dept: RADIOLOGY | Facility: CLINIC | Age: 80
End: 2025-03-31

## 2025-03-31 ENCOUNTER — APPOINTMENT (OUTPATIENT)
Dept: ENDOCRINOLOGY | Facility: CLINIC | Age: 80
End: 2025-03-31

## 2025-03-31 ENCOUNTER — OUTPATIENT (OUTPATIENT)
Dept: OUTPATIENT SERVICES | Facility: HOSPITAL | Age: 80
LOS: 1 days | End: 2025-03-31

## 2025-03-31 DIAGNOSIS — Z90.410 ACQUIRED TOTAL ABSENCE OF PANCREAS: Chronic | ICD-10-CM

## 2025-03-31 PROCEDURE — 77085 DXA BONE DENSITY AXL VRT FX: CPT | Mod: 26

## 2025-04-07 ENCOUNTER — APPOINTMENT (OUTPATIENT)
Dept: ORTHOPEDIC SURGERY | Facility: CLINIC | Age: 80
End: 2025-04-07
Payer: COMMERCIAL

## 2025-04-07 VITALS — WEIGHT: 100 LBS | HEIGHT: 59 IN | RESPIRATION RATE: 16 BRPM | BODY MASS INDEX: 20.16 KG/M2

## 2025-04-07 DIAGNOSIS — M72.0 PALMAR FASCIAL FIBROMATOSIS [DUPUYTREN]: ICD-10-CM

## 2025-04-07 DIAGNOSIS — M77.9 ENTHESOPATHY, UNSPECIFIED: ICD-10-CM

## 2025-04-07 PROCEDURE — 99203 OFFICE O/P NEW LOW 30 MIN: CPT

## 2025-04-07 PROCEDURE — 73120 X-RAY EXAM OF HAND: CPT | Mod: 50

## 2025-05-05 ENCOUNTER — APPOINTMENT (OUTPATIENT)
Dept: INTERNAL MEDICINE | Facility: CLINIC | Age: 80
End: 2025-05-05
Payer: COMMERCIAL

## 2025-05-05 VITALS
BODY MASS INDEX: 20.36 KG/M2 | HEIGHT: 59 IN | OXYGEN SATURATION: 98 % | SYSTOLIC BLOOD PRESSURE: 138 MMHG | TEMPERATURE: 97 F | DIASTOLIC BLOOD PRESSURE: 76 MMHG | HEART RATE: 69 BPM | WEIGHT: 101 LBS

## 2025-05-05 DIAGNOSIS — Z87.828 PERSONAL HISTORY OF OTHER (HEALED) PHYSICAL INJURY AND TRAUMA: ICD-10-CM

## 2025-05-05 DIAGNOSIS — K59.01 SLOW TRANSIT CONSTIPATION: ICD-10-CM

## 2025-05-05 DIAGNOSIS — R10.9 UNSPECIFIED ABDOMINAL PAIN: ICD-10-CM

## 2025-05-05 DIAGNOSIS — S01.01XS LACERATION W/OUT FOREIGN BODY OF SCALP, SEQUELA: ICD-10-CM

## 2025-05-05 DIAGNOSIS — E10.9 TYPE 1 DIABETES MELLITUS W/OUT COMPLICATIONS: ICD-10-CM

## 2025-05-05 DIAGNOSIS — Z87.898 PERSONAL HISTORY OF OTHER SPECIFIED CONDITIONS: ICD-10-CM

## 2025-05-05 DIAGNOSIS — Z87.39 PERSONAL HISTORY OF OTHER DISEASES OF THE MUSCULOSKELETAL SYSTEM AND CONNECTIVE TISSUE: ICD-10-CM

## 2025-05-05 DIAGNOSIS — M81.0 AGE-RELATED OSTEOPOROSIS W/OUT CURRENT PATHOLOGICAL FRACTURE: ICD-10-CM

## 2025-05-05 DIAGNOSIS — I10 ESSENTIAL (PRIMARY) HYPERTENSION: ICD-10-CM

## 2025-05-05 DIAGNOSIS — R89.9 UNSPECIFIED ABNORMAL FINDING IN SPECIMENS FROM OTHER ORGANS, SYSTEMS AND TISSUES: ICD-10-CM

## 2025-05-05 DIAGNOSIS — Z87.440 PERSONAL HISTORY OF URINARY (TRACT) INFECTIONS: ICD-10-CM

## 2025-05-05 PROCEDURE — 99213 OFFICE O/P EST LOW 20 MIN: CPT

## 2025-05-05 PROCEDURE — G2211 COMPLEX E/M VISIT ADD ON: CPT | Mod: NC

## 2025-05-12 ENCOUNTER — APPOINTMENT (OUTPATIENT)
Dept: ENDOCRINOLOGY | Facility: CLINIC | Age: 80
End: 2025-05-12
Payer: COMMERCIAL

## 2025-05-12 VITALS
HEART RATE: 81 BPM | BODY MASS INDEX: 20.4 KG/M2 | DIASTOLIC BLOOD PRESSURE: 79 MMHG | WEIGHT: 101 LBS | SYSTOLIC BLOOD PRESSURE: 126 MMHG

## 2025-05-12 DIAGNOSIS — M85.80 OTHER SPECIFIED DISORDERS OF BONE DENSITY AND STRUCTURE, UNSPECIFIED SITE: ICD-10-CM

## 2025-05-12 DIAGNOSIS — E10.9 TYPE 1 DIABETES MELLITUS W/OUT COMPLICATIONS: ICD-10-CM

## 2025-05-12 PROCEDURE — 82962 GLUCOSE BLOOD TEST: CPT

## 2025-05-12 PROCEDURE — 36415 COLL VENOUS BLD VENIPUNCTURE: CPT

## 2025-05-12 PROCEDURE — G2211 COMPLEX E/M VISIT ADD ON: CPT | Mod: NC

## 2025-05-12 PROCEDURE — 99214 OFFICE O/P EST MOD 30 MIN: CPT

## 2025-05-12 PROCEDURE — 83036 HEMOGLOBIN GLYCOSYLATED A1C: CPT | Mod: QW

## 2025-05-12 RX ORDER — ATORVASTATIN CALCIUM 10 MG/1
10 TABLET, FILM COATED ORAL
Qty: 90 | Refills: 1 | Status: ACTIVE | COMMUNITY
Start: 2025-05-05 | End: 1900-01-01

## 2025-05-13 LAB
25(OH)D3 SERPL-MCNC: 30.8 NG/ML
ALBUMIN SERPL ELPH-MCNC: 4.3 G/DL
ALP BLD-CCNC: 91 U/L
ALT SERPL-CCNC: 23 U/L
ANION GAP SERPL CALC-SCNC: 15 MMOL/L
AST SERPL-CCNC: 25 U/L
BILIRUB SERPL-MCNC: 0.4 MG/DL
BUN SERPL-MCNC: 37 MG/DL
CALCIUM SERPL-MCNC: 9.6 MG/DL
CHLORIDE SERPL-SCNC: 104 MMOL/L
CO2 SERPL-SCNC: 23 MMOL/L
CREAT SERPL-MCNC: 1.31 MG/DL
EGFRCR SERPLBLD CKD-EPI 2021: 41 ML/MIN/1.73M2
FRUCTOSAMINE SERPL-MCNC: 352 UMOL/L
GLUCOSE BLDC GLUCOMTR-MCNC: 99
GLUCOSE SERPL-MCNC: 122 MG/DL
HBA1C MFR BLD HPLC: 5.8
POTASSIUM SERPL-SCNC: 5.2 MMOL/L
PROT SERPL-MCNC: 6.2 G/DL
SODIUM SERPL-SCNC: 142 MMOL/L

## 2025-06-13 ENCOUNTER — APPOINTMENT (OUTPATIENT)
Dept: INFUSION THERAPY | Facility: CLINIC | Age: 80
End: 2025-06-13

## 2025-06-13 ENCOUNTER — OUTPATIENT (OUTPATIENT)
Dept: OUTPATIENT SERVICES | Facility: HOSPITAL | Age: 80
LOS: 1 days | End: 2025-06-13
Payer: COMMERCIAL

## 2025-06-13 VITALS
HEIGHT: 60 IN | WEIGHT: 102.07 LBS | HEART RATE: 68 BPM | OXYGEN SATURATION: 99 % | RESPIRATION RATE: 20 BRPM | SYSTOLIC BLOOD PRESSURE: 121 MMHG | DIASTOLIC BLOOD PRESSURE: 66 MMHG | TEMPERATURE: 98 F

## 2025-06-13 DIAGNOSIS — M81.0 AGE-RELATED OSTEOPOROSIS WITHOUT CURRENT PATHOLOGICAL FRACTURE: ICD-10-CM

## 2025-06-13 DIAGNOSIS — Z90.410 ACQUIRED TOTAL ABSENCE OF PANCREAS: Chronic | ICD-10-CM

## 2025-06-13 PROCEDURE — 96372 THER/PROPH/DIAG INJ SC/IM: CPT

## 2025-06-13 RX ORDER — DENOSUMAB 60 MG/ML
60 INJECTION SUBCUTANEOUS ONCE
Refills: 0 | Status: COMPLETED | OUTPATIENT
Start: 2025-06-13 | End: 2025-06-13

## 2025-06-13 RX ADMIN — DENOSUMAB 60 MILLIGRAM(S): 60 INJECTION SUBCUTANEOUS at 17:07

## 2025-08-13 ENCOUNTER — RX RENEWAL (OUTPATIENT)
Age: 80
End: 2025-08-13

## 2025-08-14 RX ORDER — BLOOD-GLUCOSE SENSOR
EACH MISCELLANEOUS
Qty: 6 | Refills: 4 | Status: ACTIVE | COMMUNITY
Start: 2025-07-31 | End: 1900-01-01

## 2025-09-08 ENCOUNTER — APPOINTMENT (OUTPATIENT)
Dept: INTERNAL MEDICINE | Facility: CLINIC | Age: 80
End: 2025-09-08
Payer: COMMERCIAL

## 2025-09-08 VITALS
SYSTOLIC BLOOD PRESSURE: 149 MMHG | DIASTOLIC BLOOD PRESSURE: 79 MMHG | TEMPERATURE: 96.6 F | WEIGHT: 102.13 LBS | HEART RATE: 64 BPM | HEIGHT: 59 IN | BODY MASS INDEX: 20.59 KG/M2 | OXYGEN SATURATION: 96 %

## 2025-09-08 DIAGNOSIS — Z87.440 PERSONAL HISTORY OF URINARY (TRACT) INFECTIONS: ICD-10-CM

## 2025-09-08 PROCEDURE — 99214 OFFICE O/P EST MOD 30 MIN: CPT

## 2025-09-08 PROCEDURE — G2211 COMPLEX E/M VISIT ADD ON: CPT | Mod: NC

## 2025-09-15 ENCOUNTER — APPOINTMENT (OUTPATIENT)
Dept: ENDOCRINOLOGY | Facility: CLINIC | Age: 80
End: 2025-09-15
Payer: COMMERCIAL

## 2025-09-15 VITALS
HEART RATE: 59 BPM | WEIGHT: 105 LBS | DIASTOLIC BLOOD PRESSURE: 55 MMHG | SYSTOLIC BLOOD PRESSURE: 97 MMHG | BODY MASS INDEX: 21.21 KG/M2

## 2025-09-15 VITALS
SYSTOLIC BLOOD PRESSURE: 107 MMHG | WEIGHT: 102 LBS | BODY MASS INDEX: 20.6 KG/M2 | HEART RATE: 77 BPM | DIASTOLIC BLOOD PRESSURE: 60 MMHG

## 2025-09-15 DIAGNOSIS — E10.9 TYPE 1 DIABETES MELLITUS W/OUT COMPLICATIONS: ICD-10-CM

## 2025-09-15 DIAGNOSIS — M85.80 OTHER SPECIFIED DISORDERS OF BONE DENSITY AND STRUCTURE, UNSPECIFIED SITE: ICD-10-CM

## 2025-09-15 LAB
GLUCOSE BLDC GLUCOMTR-MCNC: 131
HBA1C MFR BLD HPLC: 5.9

## 2025-09-15 PROCEDURE — 83036 HEMOGLOBIN GLYCOSYLATED A1C: CPT | Mod: QW

## 2025-09-15 PROCEDURE — 82962 GLUCOSE BLOOD TEST: CPT

## 2025-09-15 PROCEDURE — 99214 OFFICE O/P EST MOD 30 MIN: CPT

## 2025-09-15 PROCEDURE — G2211 COMPLEX E/M VISIT ADD ON: CPT | Mod: NC
